# Patient Record
Sex: FEMALE | URBAN - METROPOLITAN AREA
[De-identification: names, ages, dates, MRNs, and addresses within clinical notes are randomized per-mention and may not be internally consistent; named-entity substitution may affect disease eponyms.]

---

## 2018-02-22 ENCOUNTER — RECORDS - HEALTHEAST (OUTPATIENT)
Dept: LAB | Facility: CLINIC | Age: 23
End: 2018-02-22

## 2018-02-24 LAB
QTF INTERPRETATION: ABNORMAL
QTF MITOGEN - NIL: >10 IU/ML
QTF NIL: 0.09 IU/ML
QTF RESULT: POSITIVE
QTF TB ANTIGEN - NIL: 6.54 IU/ML

## 2018-11-21 ENCOUNTER — HOSPITAL ENCOUNTER (INPATIENT)
Facility: CLINIC | Age: 23
LOS: 58 days | Discharge: HOME | End: 2019-01-18
Attending: FAMILY MEDICINE | Admitting: PSYCHIATRY & NEUROLOGY
Payer: COMMERCIAL

## 2018-11-21 DIAGNOSIS — G44.209 TENSION HEADACHE: Primary | ICD-10-CM

## 2018-11-21 DIAGNOSIS — R00.0 TACHYCARDIA: ICD-10-CM

## 2018-11-21 DIAGNOSIS — F28 OTHER PSYCHOTIC DISORDER NOT DUE TO SUBSTANCE OR KNOWN PHYSIOLOGICAL CONDITION (H): ICD-10-CM

## 2018-11-21 DIAGNOSIS — F25.9 SCHIZOAFFECTIVE DISORDER, UNSPECIFIED TYPE (H): ICD-10-CM

## 2018-11-21 DIAGNOSIS — K59.03 DRUG-INDUCED CONSTIPATION: ICD-10-CM

## 2018-11-21 DIAGNOSIS — E55.9 VITAMIN D DEFICIENCY: ICD-10-CM

## 2018-11-21 DIAGNOSIS — F29 PSYCHOSIS, UNSPECIFIED PSYCHOSIS TYPE (H): ICD-10-CM

## 2018-11-21 LAB
AMPHETAMINES UR QL SCN: NEGATIVE
BARBITURATES UR QL: NEGATIVE
BENZODIAZ UR QL: NEGATIVE
CANNABINOIDS UR QL SCN: NEGATIVE
COCAINE UR QL: NEGATIVE
ETHANOL UR QL SCN: NEGATIVE
HCG UR QL: NEGATIVE
OPIATES UR QL SCN: NEGATIVE

## 2018-11-21 PROCEDURE — 99285 EMERGENCY DEPT VISIT HI MDM: CPT | Mod: 25 | Performed by: FAMILY MEDICINE

## 2018-11-21 PROCEDURE — 81025 URINE PREGNANCY TEST: CPT | Performed by: FAMILY MEDICINE

## 2018-11-21 PROCEDURE — 93005 ELECTROCARDIOGRAM TRACING: CPT

## 2018-11-21 PROCEDURE — 80320 DRUG SCREEN QUANTALCOHOLS: CPT | Performed by: FAMILY MEDICINE

## 2018-11-21 PROCEDURE — 12400001 ZZH R&B MH UMMC

## 2018-11-21 PROCEDURE — 90791 PSYCH DIAGNOSTIC EVALUATION: CPT

## 2018-11-21 PROCEDURE — 80307 DRUG TEST PRSMV CHEM ANLYZR: CPT | Performed by: FAMILY MEDICINE

## 2018-11-21 PROCEDURE — 99285 EMERGENCY DEPT VISIT HI MDM: CPT | Mod: Z6 | Performed by: FAMILY MEDICINE

## 2018-11-21 PROCEDURE — 25000132 ZZH RX MED GY IP 250 OP 250 PS 637: Performed by: FAMILY MEDICINE

## 2018-11-21 PROCEDURE — 93010 ELECTROCARDIOGRAM REPORT: CPT | Performed by: INTERNAL MEDICINE

## 2018-11-21 PROCEDURE — 25000132 ZZH RX MED GY IP 250 OP 250 PS 637: Performed by: STUDENT IN AN ORGANIZED HEALTH CARE EDUCATION/TRAINING PROGRAM

## 2018-11-21 RX ORDER — OLANZAPINE 10 MG/2ML
10 INJECTION, POWDER, FOR SOLUTION INTRAMUSCULAR
Status: DISCONTINUED | OUTPATIENT
Start: 2018-11-21 | End: 2018-12-26

## 2018-11-21 RX ORDER — ARIPIPRAZOLE 20 MG/1
20 TABLET ORAL AT BEDTIME
COMMUNITY
End: 2018-11-21

## 2018-11-21 RX ORDER — OLANZAPINE 10 MG/1
10 TABLET, ORALLY DISINTEGRATING ORAL ONCE
Status: COMPLETED | OUTPATIENT
Start: 2018-11-21 | End: 2018-11-21

## 2018-11-21 RX ORDER — OLANZAPINE 10 MG/1
10 TABLET ORAL
Status: DISCONTINUED | OUTPATIENT
Start: 2018-11-21 | End: 2018-12-26

## 2018-11-21 RX ORDER — MULTIPLE VITAMINS W/ MINERALS TAB 9MG-400MCG
1 TAB ORAL DAILY
COMMUNITY
End: 2018-11-21

## 2018-11-21 RX ORDER — ACETAMINOPHEN 325 MG/1
650 TABLET ORAL EVERY 4 HOURS PRN
Status: DISCONTINUED | OUTPATIENT
Start: 2018-11-21 | End: 2019-01-18 | Stop reason: HOSPADM

## 2018-11-21 RX ORDER — OLANZAPINE 10 MG/1
10 TABLET ORAL AT BEDTIME
Status: DISCONTINUED | OUTPATIENT
Start: 2018-11-21 | End: 2018-11-27

## 2018-11-21 RX ORDER — HYDROXYZINE HYDROCHLORIDE 25 MG/1
25 TABLET, FILM COATED ORAL EVERY 4 HOURS PRN
Status: DISCONTINUED | OUTPATIENT
Start: 2018-11-21 | End: 2018-12-09

## 2018-11-21 RX ADMIN — OLANZAPINE 10 MG: 10 TABLET, FILM COATED ORAL at 21:16

## 2018-11-21 RX ADMIN — OLANZAPINE 10 MG: 10 TABLET, ORALLY DISINTEGRATING ORAL at 14:15

## 2018-11-21 ASSESSMENT — ENCOUNTER SYMPTOMS
NERVOUS/ANXIOUS: 1
HALLUCINATIONS: 1
FEVER: 0
SHORTNESS OF BREATH: 0
DYSPHORIC MOOD: 1
ABDOMINAL PAIN: 0

## 2018-11-21 ASSESSMENT — ACTIVITIES OF DAILY LIVING (ADL)
ORAL_HYGIENE: INDEPENDENT
DRESS: INDEPENDENT
GROOMING: INDEPENDENT
LAUNDRY: WITH SUPERVISION

## 2018-11-21 NOTE — ED PROVIDER NOTES
History     Chief Complaint   Patient presents with     Depression     irrational behaviors, jumped out of car on the way to psych appointment, has not been sleeping,      HPI  Dannie Davis is a 23 year old female who presents emergency room with increased psychotic features patient appears to be responding to internal stimuli here in the emergency room.  Today patient was supposed to be going to outpatient appointment and instead left the home was wandering through the streets stating that she was getting fast food.  Patient's family concerned about her safety she is not been tracking well and has not been sleeping for at least a week he has had a decreased appetite and no other associated symptoms but clearly not tracking the way she normally does.  Stressors include leaving her 3-year-old daughter in [facility] having her mother returning from [facility] without the granddaughter and at this time patient is feeling overwhelmed.  Patient denies any suicidal ideation but appears to be responding to stimuli when interviewed here in the emergency room.    I have reviewed the Medications, Allergies, Past Medical and Surgical History, and Social History in the Epic system.    PERSONAL MEDICAL HISTORY  Past Medical History:   Diagnosis Date     Gastroesophageal reflux disease      PAST SURGICAL HISTORY  History reviewed. No pertinent surgical history.  FAMILY HISTORY  No family history on file.  SOCIAL HISTORY  Social History   Substance Use Topics     Smoking status: Never Smoker     Smokeless tobacco: Never Used     Alcohol use Yes     MEDICATIONS  No current facility-administered medications for this encounter.      Current Outpatient Prescriptions   Medication     ACETAMINOPHEN PO     ARIPiprazole (ABILIFY) 20 MG tablet     BENZTROPINE MESYLATE PO     MIRTAZAPINE PO     multivitamin, therapeutic with minerals (MULTI-VITAMIN) TABS tablet     OMEPRAZOLE PO     ALLERGIES  Allergies   Allergen Reactions     Septra  [Sulfamethoxazole W/Trimethoprim]          Review of Systems   Constitutional: Negative for fever.   Respiratory: Negative for shortness of breath.    Cardiovascular: Negative for chest pain.   Gastrointestinal: Negative for abdominal pain.   Psychiatric/Behavioral: Positive for dysphoric mood and hallucinations. The patient is nervous/anxious.    All other systems reviewed and are negative.      Physical Exam   BP: (!) 136/96  Pulse: 110  Temp: 99.8  F (37.7  C)  Resp: 16  SpO2: 99 %      Physical Exam   Constitutional: No distress.   HENT:   Head: Atraumatic.   Mouth/Throat: Oropharynx is clear and moist. No oropharyngeal exudate.   Eyes: Pupils are equal, round, and reactive to light. No scleral icterus.   Cardiovascular: Normal heart sounds and intact distal pulses.    Pulmonary/Chest: Breath sounds normal. No respiratory distress.   Abdominal: Soft. Bowel sounds are normal. There is no tenderness.   Musculoskeletal: She exhibits no edema or tenderness.   Neurological: She is alert. No cranial nerve deficit. She exhibits normal muscle tone. Coordination normal.   Skin: Skin is warm. No rash noted. She is not diaphoretic.   Psychiatric: Her mood appears anxious. Her affect is blunt. She is actively hallucinating. Thought content is paranoid. She expresses impulsivity.       ED Course     ED Course     Procedures    Patient was seen and evaluated by the  please refer to her documentation in the note section of the epic chart dated 11/21/2018    Critical Care time:  none             Labs Ordered and Resulted from Time of ED Arrival Up to the Time of Departure from the ED   DRUG ABUSE SCREEN 6 CHEM DEP URINE ([facility])   HCG QUALITATIVE URINE            Assessments & Plan (with Medical Decision Making)     I have reviewed the nursing notes.    I have reviewed the findings, diagnosis, plan and need for follow up with the patient.  Patient with history of previous psychosis now presenting with recurring psychotic  features currently off medications for at least 3 or 4 months patient not tracking well and is at risk to herself and others she will be placed on 72-hour hold and admitted to locked psychiatric unit for further evaluation and treatment.    New Prescriptions    No medications on file       Final diagnoses:   Schizoaffective disorder, unspecified type (H)   Other psychotic disorder not due to substance or known physiological condition (H)       11/21/2018   [facility] [place], EMERGENCY DEPARTMENT     Marcelo Amador MD  11/21/18 2133

## 2018-11-21 NOTE — ED NOTES
Bed: ED16A  Expected date: 11/21/18  Expected time:   Means of arrival:   Comments:  N 725, Female, 23 y/o , Mental Health

## 2018-11-21 NOTE — ED NOTES
72 hour hold paper work given to patient. No issues ,concerns,questions for now. Patient remains calm.

## 2018-11-21 NOTE — PHARMACY-ADMISSION MEDICATION HISTORY
Admission medication history for the November 21, 2018 admission is complete.     Medication history interview sources: Patient's father, Care Everywhere ([facility])    Medication compliance: N/A - patient not prescribed medications    Preferred Outpatient Pharmacy: Unknown - father was not certain of pharmacy name ([facility]?)    Changes made to PTA medication list (reason)  Added: none  Deleted:   - olanzapine 20 mg twice daily  - mirtazapine 15 mg at bedtime  - lorazepam 0.5 mg twice daily prn agitation   Changed: none    Additional medication history information (including reliability of information, actions taken by pharmacist):  - Patient was hospitalized at [facility] last year from 4/29-5/31/2017 where she was started on the medications listed above (olanzapine, mirtazapine, lorazepam). According to the patient's father she has since received care at [facility], however it has been at least 6 months since she has taken any medications and it is unclear if she has been following up with [facility] recently. Patient's father could not recall what pharmacy the patient used and [facility] is closed at this time so unable to verify if medications have been changed since her hospitalization in 2017.     Prior to Admission medications    Not on File       Time spent: 40 minutes    Medication history completed by:   Jazmin Shah PharmD  [facility]  Available daily from 1-9 PM: phone [phone], pager [phone]

## 2018-11-21 NOTE — ED NOTES
ED to Behavioral Floor Handoff    SITUATION  Dannie Davis is a 23 year old female who speaks English and lives in a home with family members The patient arrived in the ED by transportation van from home with a complaint of Depression (irrational behaviors, jumped out of car on the way to psych appointment, has not been sleeping, )  .The patient's current symptoms started/worsened 1 week(s) ago and during this time the symptoms have increased.   In the ED, pt was diagnosed with   Final diagnoses:   Schizoaffective disorder, unspecified type (H)   Other psychotic disorder not due to substance or known physiological condition (H)        Initial vitals were: BP: (!) 136/96  Pulse: 110  Temp: 99.8  F (37.7  C)  Resp: 16  SpO2: 99 %   --------  Is the patient diabetic? No   If yes, last blood glucose? --     If yes, was this treated in the ED? --  --------  Is the patient inebriated (ETOH) No or Impaired on other substances? No  MSSA done? No  Last MSSA score: --    Were withdrawal symptoms treated? No  Does the patient have a seizure history? No. If yes, date of most recent seizure--  --------  Is the patient patient experiencing suicidal ideation? denies current or recent suicidal ideation     Homicidal ideation? denies current or recent homicidal ideation or behaviors.    Self-injurious behavior/urges? denies current or recent self injurious behavior or ideation.  ------  Was pt aggressive in the ED No  Was a code called No  Is the pt now cooperative? Yes  -------  Meds given in ED:   Medications   OLANZapine zydis (zyPREXA) ODT tab 10 mg (10 mg Oral Given 11/21/18 1415)      Family present during ED course? Yes  Family currently present? Yes    BACKGROUND  Does the patient have a cognitive impairment or developmental disability? Yes  Allergies:   Allergies   Allergen Reactions     Septra [Sulfamethoxazole W/Trimethoprim]    .   Social demographics are   Social History     Social History     Marital status: Single      Spouse name: N/A     Number of children: N/A     Years of education: N/A     Social History Main Topics     Smoking status: Never Smoker     Smokeless tobacco: Never Used     Alcohol use Yes     Drug use: No     Sexual activity: Not Asked     Other Topics Concern     None     Social History Narrative     None        ASSESSMENT  Labs results   Labs Ordered and Resulted from Time of ED Arrival Up to the Time of Departure from the ED   DRUG ABUSE SCREEN 6 CHEM DEP URINE ([facility])   HCG QUALITATIVE URINE      Imaging Studies: No results found for this or any previous visit (from the past 24 hour(s)).   Most recent vital signs BP (!) 139/97  Pulse 103  Temp 98.7  F (37.1  C) (Oral)  Resp 20  SpO2 98%   Abnormal labs/tests/findings requiring intervention:---   Pain control: pt had none  Nausea control: pt had none    RECOMMENDATION  Are any infection precautions needed (MRSA, VRE, etc.)? No If yes, what infection? --  ---  Does the patient have mobility issues? independently. If yes, what device does the pt use? ---  ---  Is patient on 72 hour hold or commitment? No If on 72 hour hold, have hold and rights been given to patient? No  Are admitting orders written if after 10 p.m. ?No  Tasks needing to be completed:---     Mirtha Hagan   ascom--    [phone] [facility] ED   [phone] [facility] ED

## 2018-11-22 LAB
ALBUMIN SERPL-MCNC: 3.3 G/DL (ref 3.4–5)
ALP SERPL-CCNC: 49 U/L (ref 40–150)
ALT SERPL W P-5'-P-CCNC: 18 U/L (ref 0–50)
ANION GAP SERPL CALCULATED.3IONS-SCNC: 8 MMOL/L (ref 3–14)
AST SERPL W P-5'-P-CCNC: 13 U/L (ref 0–45)
BASOPHILS # BLD AUTO: 0 10E9/L (ref 0–0.2)
BASOPHILS NFR BLD AUTO: 0.2 %
BILIRUB SERPL-MCNC: 0.4 MG/DL (ref 0.2–1.3)
BUN SERPL-MCNC: 8 MG/DL (ref 7–30)
CALCIUM SERPL-MCNC: 8.7 MG/DL (ref 8.5–10.1)
CHLORIDE SERPL-SCNC: 107 MMOL/L (ref 94–109)
CHOLEST SERPL-MCNC: 148 MG/DL
CO2 SERPL-SCNC: 27 MMOL/L (ref 20–32)
CREAT SERPL-MCNC: 0.67 MG/DL (ref 0.52–1.04)
DIFFERENTIAL METHOD BLD: ABNORMAL
EOSINOPHIL # BLD AUTO: 0.2 10E9/L (ref 0–0.7)
EOSINOPHIL NFR BLD AUTO: 3.1 %
ERYTHROCYTE [DISTWIDTH] IN BLOOD BY AUTOMATED COUNT: 12.9 % (ref 10–15)
ERYTHROCYTE [SEDIMENTATION RATE] IN BLOOD BY WESTERGREN METHOD: 15 MM/H (ref 0–20)
FOLATE SERPL-MCNC: 14.3 NG/ML
GFR SERPL CREATININE-BSD FRML MDRD: >90 ML/MIN/1.7M2
GLUCOSE SERPL-MCNC: 94 MG/DL (ref 70–99)
HCT VFR BLD AUTO: 34.6 % (ref 35–47)
HDLC SERPL-MCNC: 56 MG/DL
HGB BLD-MCNC: 11.8 G/DL (ref 11.7–15.7)
IMM GRANULOCYTES # BLD: 0 10E9/L (ref 0–0.4)
IMM GRANULOCYTES NFR BLD: 0.2 %
LDLC SERPL CALC-MCNC: 79 MG/DL
LYMPHOCYTES # BLD AUTO: 2 10E9/L (ref 0.8–5.3)
LYMPHOCYTES NFR BLD AUTO: 41.3 %
MCH RBC QN AUTO: 27 PG (ref 26.5–33)
MCHC RBC AUTO-ENTMCNC: 34.1 G/DL (ref 31.5–36.5)
MCV RBC AUTO: 79 FL (ref 78–100)
MONOCYTES # BLD AUTO: 0.4 10E9/L (ref 0–1.3)
MONOCYTES NFR BLD AUTO: 8 %
NEUTROPHILS # BLD AUTO: 2.3 10E9/L (ref 1.6–8.3)
NEUTROPHILS NFR BLD AUTO: 47.2 %
NONHDLC SERPL-MCNC: 92 MG/DL
NRBC # BLD AUTO: 0 10*3/UL
NRBC BLD AUTO-RTO: 0 /100
PLATELET # BLD AUTO: 212 10E9/L (ref 150–450)
POTASSIUM SERPL-SCNC: 3.6 MMOL/L (ref 3.4–5.3)
PROT SERPL-MCNC: 7.4 G/DL (ref 6.8–8.8)
RBC # BLD AUTO: 4.37 10E12/L (ref 3.8–5.2)
SODIUM SERPL-SCNC: 142 MMOL/L (ref 133–144)
TRIGL SERPL-MCNC: 66 MG/DL
TSH SERPL DL<=0.005 MIU/L-ACNC: 1.33 MU/L (ref 0.4–4)
VIT B12 SERPL-MCNC: 907 PG/ML (ref 193–986)
WBC # BLD AUTO: 4.9 10E9/L (ref 4–11)

## 2018-11-22 PROCEDURE — 87389 HIV-1 AG W/HIV-1&-2 AB AG IA: CPT | Performed by: STUDENT IN AN ORGANIZED HEALTH CARE EDUCATION/TRAINING PROGRAM

## 2018-11-22 PROCEDURE — 36415 COLL VENOUS BLD VENIPUNCTURE: CPT | Performed by: STUDENT IN AN ORGANIZED HEALTH CARE EDUCATION/TRAINING PROGRAM

## 2018-11-22 PROCEDURE — 99223 1ST HOSP IP/OBS HIGH 75: CPT | Mod: AI | Performed by: PSYCHIATRY & NEUROLOGY

## 2018-11-22 PROCEDURE — 80053 COMPREHEN METABOLIC PANEL: CPT | Performed by: STUDENT IN AN ORGANIZED HEALTH CARE EDUCATION/TRAINING PROGRAM

## 2018-11-22 PROCEDURE — 86618 LYME DISEASE ANTIBODY: CPT | Performed by: STUDENT IN AN ORGANIZED HEALTH CARE EDUCATION/TRAINING PROGRAM

## 2018-11-22 PROCEDURE — 86780 TREPONEMA PALLIDUM: CPT | Performed by: STUDENT IN AN ORGANIZED HEALTH CARE EDUCATION/TRAINING PROGRAM

## 2018-11-22 PROCEDURE — 82607 VITAMIN B-12: CPT | Performed by: STUDENT IN AN ORGANIZED HEALTH CARE EDUCATION/TRAINING PROGRAM

## 2018-11-22 PROCEDURE — 25000132 ZZH RX MED GY IP 250 OP 250 PS 637: Performed by: STUDENT IN AN ORGANIZED HEALTH CARE EDUCATION/TRAINING PROGRAM

## 2018-11-22 PROCEDURE — 82390 ASSAY OF CERULOPLASMIN: CPT | Performed by: STUDENT IN AN ORGANIZED HEALTH CARE EDUCATION/TRAINING PROGRAM

## 2018-11-22 PROCEDURE — 12400007 ZZH R&B MH INTERMEDIATE UMMC

## 2018-11-22 PROCEDURE — 82306 VITAMIN D 25 HYDROXY: CPT | Performed by: STUDENT IN AN ORGANIZED HEALTH CARE EDUCATION/TRAINING PROGRAM

## 2018-11-22 PROCEDURE — 85025 COMPLETE CBC W/AUTO DIFF WBC: CPT | Performed by: STUDENT IN AN ORGANIZED HEALTH CARE EDUCATION/TRAINING PROGRAM

## 2018-11-22 PROCEDURE — 82746 ASSAY OF FOLIC ACID SERUM: CPT | Performed by: STUDENT IN AN ORGANIZED HEALTH CARE EDUCATION/TRAINING PROGRAM

## 2018-11-22 PROCEDURE — 85652 RBC SED RATE AUTOMATED: CPT | Performed by: STUDENT IN AN ORGANIZED HEALTH CARE EDUCATION/TRAINING PROGRAM

## 2018-11-22 PROCEDURE — 84443 ASSAY THYROID STIM HORMONE: CPT | Performed by: STUDENT IN AN ORGANIZED HEALTH CARE EDUCATION/TRAINING PROGRAM

## 2018-11-22 PROCEDURE — 80061 LIPID PANEL: CPT | Performed by: STUDENT IN AN ORGANIZED HEALTH CARE EDUCATION/TRAINING PROGRAM

## 2018-11-22 PROCEDURE — 86038 ANTINUCLEAR ANTIBODIES: CPT | Performed by: STUDENT IN AN ORGANIZED HEALTH CARE EDUCATION/TRAINING PROGRAM

## 2018-11-22 RX ORDER — AMOXICILLIN 250 MG
1-2 CAPSULE ORAL 2 TIMES DAILY PRN
Status: DISCONTINUED | OUTPATIENT
Start: 2018-11-22 | End: 2018-11-28

## 2018-11-22 RX ORDER — ALUMINA, MAGNESIA, AND SIMETHICONE 2400; 2400; 240 MG/30ML; MG/30ML; MG/30ML
30 SUSPENSION ORAL 4 TIMES DAILY PRN
Status: DISCONTINUED | OUTPATIENT
Start: 2018-11-22 | End: 2019-01-18 | Stop reason: HOSPADM

## 2018-11-22 RX ADMIN — OLANZAPINE 10 MG: 10 TABLET, FILM COATED ORAL at 20:29

## 2018-11-22 RX ADMIN — OLANZAPINE 10 MG: 10 TABLET, FILM COATED ORAL at 23:17

## 2018-11-22 RX ADMIN — SENNOSIDES AND DOCUSATE SODIUM 1 TABLET: 8.6; 5 TABLET ORAL at 13:46

## 2018-11-22 ASSESSMENT — ACTIVITIES OF DAILY LIVING (ADL)
DRESS: INDEPENDENT
LAUNDRY: WITH SUPERVISION
ORAL_HYGIENE: INDEPENDENT
GROOMING: INDEPENDENT;SHOWER;HANDWASHING
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
## 2018-11-22 NOTE — PROGRESS NOTES
11/21/18 2011   Patient Belongings   Did you bring any home meds/supplements to the hospital?  No   Patient Belongings cell phone/electronics;clothing;shoes   Disposition of Belongings Kept with patient;Locker   Belongings Search Yes   Clothing Search Yes   Second Staff Michael JONES   General Info Comment No belongings sent to security      Belongings: Black boots, Gray Jacket, cell Phone, ID with keys attached (in jacket pocket), clothing, brown head cap    A               Admission:  I am responsible for any personal items that are not sent to the safe or pharmacy.  [facility] is not responsible for loss, theft or damage of any property in my possession.    Signature:  _________________________________ Date: _______  Time: _____                                              Staff Signature:  ____________________________ Date: ________  Time: _____      2nd Staff person, if patient is unable/unwilling to sign:    Signature: ________________________________ Date: ________  Time: _____     Discharge:  [facility] has returned all of my personal belongings:    Signature: _________________________________ Date: ________  Time: _____                                          Staff Signature:  ____________________________ Date: ________  Time: _____

## 2018-11-22 NOTE — PLAN OF CARE
"Admission:     Admitted to  22 on a 72 hour hold for increasing psychotic symptoms. Endorses auditory hallucinations. Family reports she has not been tracking, and has not slept in a week. One prior hospitalization in January 2017 where she was placed on a stay of commitment. Has been off medications for approximately 6 months. The pt came from [facility] in 2017, and apparently left a 3 year old daughter there, who she states she misses. Her mother is currently in [facility] and returning without the granddaughter per ER notes. Pt reports that her father and brother are \"controlling\", and the only person in her family who is supportive is her mother. (See chart review for further details and hx)    Presents as calm and cooperative completing admission. Speech is delayed, mumbled at times. Pt states she prefers English and does not want an . Appears preoccupied and responding to internal stimuli. Distractible with poor concentration. She denies suicidal thoughts, but does say she has been feeling depressed. Oriented to room and unit. Provided meal. Encouraged to notify staff of needs, questions, and concerns. Pt agrees.   "

## 2018-11-22 NOTE — PROGRESS NOTES
Patient remained isolative and withdrawn much of the shift mostly pacing in the freedman. She calm, cooperative but appeared depressed and sad. She denied SI and SIB thoughts,  A&V hallucinations and racing thoughts. Her affect was flat and blunted, sometimes presenting with thought blocking during one-on-one interview. Patient reported mild anxiety and depression. She had limited insight and judgement, her hygiene was inadequate and was encouraged to take shower. Patient c/o constipation and was given senna one tablet with some improvement. She told me that she currently lives with her father and brother and she works in [employer] [place]. She said that she gives her father $450 every month for rent. Her main stressor is not seeing her baby and trying to get back to work to pay bills.

## 2018-11-22 NOTE — PLAN OF CARE
Problem: Occupational Therapy Goals (Adult)  Goal: Occupational Therapy Goals  Stand Alone Therapy Goal    Pt has not attended OT since admit.  Will continue to encourage participation and completion of  self-assessment as able. OT staff will explain the purpose of being involved with treatment plan and provide options to meet current needs and goals.

## 2018-11-22 NOTE — H&P
"History and Physical    Dannie Davis MRN# [id number]   Age: 23 year old YOB: 1995     Date of Admission:  11/21/2018          Contacts:   Primary Outpatient Psychiatrist: BRODERICK Gomez, [place] and Guillermo Li MD at [facility] clinic (next appointment with Dr. Guillermo Li is on 12/27/18 9am)  Primary Physician:  No Ref-Primary, Physician  [facility] : Vinayak Latham [phone]  Family Members: Jonathan Davis, father, [phone]         Chief Complaint:   \"My father thinks I am sick.\"         History of Present Illness:   History obtained from patient interview, chart review.  Pt interviewed in the [facility] at approximately 7:30pm.    This patient is a 23 year old female with a psychiatric history of MDD with psychotic features who presented to the [facility] ED via EMS on 11/21/2018 due to psychosis in the setting of medication non-adherence. She was medically cleared for admission to inpatient psychiatric unit.    Per ED report:  Per Dannie's father, she was supposed to attend an outpatient appointment today for \"an itchy vagina.\" She was getting ready to leave but instead left the house and began walking down the street to get fast food. He tried to get her to come home but she refused, walking down the middle of the street. She reportedly attempted to jump out of a car en route to the appointment. Her family has been concerned about her safety as she has not been tracking well, has not been sleeping for at least a week, and has had a decreased appetite. Dannie has been non-adherent to medications for at least 6 months. She was seen at [facility] yesterday by psychiatry to restart medications, but Dannie has yet to restart these. Stressors include leaving her 3-year-old daughter in [place] when she immigrated to the [place] in 1/2017 and having her mother return to [place] to be with patient's daughter. 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 In the ED, she was " "noted to be a poor historian. She reported feeling overwhelmed but denied SI or HI. She denies trying to jump out of a moving car. She was responding to internal stimuli, slow to respond to questions, repeatedly sticking out her tongue to touch her bottom lip, and seen chanting and singing to herself. She became agitated in the ED and was given Olanzapine 10mg once in the ED at 1415. UDS and HCG urine negative.     Per patient report:    When asked why she is in the hospital, Dannie describes that all of the food in her house tastes bitter, so she decided to go get fast food today. Her father then told her to stop walking to get food and that she was sick. Dannie states that she is \"scared\" of her father but denies that he has harmed her. She states that \"when I look at my father and brother, I know they are trying to hurt me. They are controlling me.\" She is frightened by this and is unsure if other people are trying to harm her. She describes her father controlling her as \"when he was here, he made me very serious.\" She notes that she is less serious now that he is gone. Dannie endorses auditory hallucinations that began about a week ago, about the same time that she noticed a scratch below her lower lip. When asked what the voices say, she responds that the voices \"make my ear itch and make me feel funny.\" The voices also \"make my heart beat strong.\" She denies that the voices have been commanding her to do anything and specifically denies that the voices command her to harm herself or others. She describes that the voices do control her and make her \"sit, stand, and eat food.\" Dannie denies visual hallucinations or ideas of references. She endorses both thought insertions and deletions that \"make my heart beat strong.\" She describes her mood as \"fine\" and notes that she has had difficulty sleeping for the past week, since she noticed the scratch below her lip. She states that she has been on Abilify and " "Olanzapine before and agrees to start Olanzapine tonight.     Per chart review: Per records, Dannie was hospitalized at [facility] from -17 with the diagnosis of MDD with psychotic features. Those records indicate that she did not have any psychiatric symptoms before coming to the  and being  from her daughter in 2017. During that admission, she had a negative CT head and normal EKG.     The risks, benefits, alternatives and side effects have been discussed and are understood by the patient and other caregivers.       Psychiatric Review of Systems:   Depression:   Reports: changes in sleep, irritability.   Denies: depressed mood, suicidal ideation, changes in appetite,  hopelessness, helplessness, decreased energy, irritability.  Jadyn:   Reports: sleeplessness, impulsiveness of walking down the middle of the street.   Denies: increased goal-directed activities, pressured speech, grandiose delusions.  Psychosis:   Reports: auditory hallucinations, paranoia, thought insertions, thought deletions.  Denies: visual hallucinations, grandiosity, ideas of reference         Medical Review of Systems:   The Review of Systems is negative other than noted in the HPI         Psychiatric History:     Prior diagnoses: MDD with psychotic features    Hospitalizations: Hospitalized at [facility] -17 where started on olanzapine, mirtazapine, lorazepam.     Suicide attempts: denies    Self-injurious behavior: denies    Violence: History of 5th degree assault charges. History of aggression towards her mother in 2017    ECT/TMS: denies    Past medications: Abilify 20mg daily, Olanzapine 20mg BID, Mirtazapine 15mg at bedtime, lorazepam 0.5mg BID prn    Prior commitments: Patient was on a stay of commitment in  that  2017         Substance Use History:     Nicotine: denies    Alcohol: \"occasionally\"           Cannabis: denies    Others: denies    Prior CD treatments: denies         " Past Medical History:     Past Medical History:   Diagnosis Date     Gastroesophageal reflux disease      History reviewed. No pertinent surgical history.  No History of seizures or head trauma.       Allergies:      Allergies   Allergen Reactions     Septra [Sulfamethoxazole W/Trimethoprim]           Medications:     No current outpatient prescriptions on file.           Social History:     Up[place]: [place] from [facility] in 1/[place]    Family/Relationships: Mother and patient's daughter are in [facility]. Patient's daughter is young, about 3-3 yo.    Living Situation: Lives with Father an Brother.     Occupation: Packaging at [employer]    Legal: Per father, has a reciprocal restraining order with a man she had a relationship with and she violated the restraining order. She has a court date on 12/20/18 for this. Patient charged with 5th degree assault in 2017.     Abuse/Trauma: Possible trauma history while in [facility]         Family History:     No family history on file.         Labs:     Recent Results (from the past 24 hour(s))   Drug abuse screen 6 urine (tox)    Collection Time: 11/21/18  1:20 PM   Result Value Ref Range    Amphetamine Qual Urine Negative NEG^Negative    Barbiturates Qual Urine Negative NEG^Negative    Benzodiazepine Qual Urine Negative NEG^Negative    Cannabinoids Qual Urine Negative NEG^Negative    Cocaine Qual Urine Negative NEG^Negative    Ethanol Qual Urine Negative NEG^Negative    Opiates Qualitative Urine Negative NEG^Negative   HCG qualitative urine    Collection Time: 11/21/18  1:20 PM   Result Value Ref Range    HCG Qual Urine Negative NEG^Negative            Psychiatric Examination:     BP (!) 139/97  Pulse 103  Temp 98.7  F (37.1  C) (Oral)  Resp 20  SpO2 98%    Appearance:  awake, alert, adequately groomed and wearing personal clothes. Initially standing in doorway of room but agrees to sit in chair for interview. Sits comfortably in chair. Removes one sock for entire interview.  "Yawns intermittently.   Attitude:  cooperative  Eye Contact:  fair, looks down at ground or sock in her hand occasionally  Mood:  \"fine\"  Affect:  mood incongruent and intensity is flat  Speech:  Difficult to understand at times given accent. Normal quantity though not conversational. Normal rate and volume. Slow to respond to some questions.  Psychomotor Behavior:  no evidence of tardive dyskinesia, dystonia, or tics. No psychomotor agitation  Thought Process:  linear, goal oriented and responds appropriately to questions. Slow to answer some questions but does not stop speaking mid sentence, unclear if thought blocking  Associations:  no loose associations  Thought Content:  no evidence of suicidal ideation or homicidal ideation, auditory hallucinations present and no visual hallucinations present. Patient does not appear to be responding to internal stimuli at this time. Endorses paranoid delusions.   Insight:  limited, does not believe that she has a mental illness  Judgment:  fair, agrees to start olanzapine  Oriented to:  time, person, and place  Attention Span and Concentration:  fair  Recent and Remote Memory:  intact  Language:  english with appropriate syntax and vocabulary  Fund of Knowledge: appropriate  Muscle Strength and Tone: did not assess, grossly normal  Gait and Station: Normal         Physical Exam:     See ED assessment note by Dr. Amador on 11/21/2018        Assessment   Dannie Davis is a 23 year old female with a history of MDD with psychotic features who presented to the [facility] ED via EMS  11/21/2018 with psychosis in the context of medication non-adherence. The patient's last psychiatric hospitalization was in 5/2017.  The patient is currently followed by [facility] clinic. Unknown family history. Current psychosocial stressors include separation from young daughter and [place]'s daughter. The patient denies drug abuse or self injurious behaviors. The MSE is " notable for an -American woman sitting calmly in a chair with a flat affect and slow responses to questions. She endorses paranoid delusions and AH. The patient's reported symptoms of auditory hallucinations, paranoid delusions, thought insertions and deletions, delusion of her body being controlled, and difficulty sleeping are consistent with psychosis. Dannie has a historical diagnosis of MDD with psychotic features, though she denies any symptoms of a current depressive episode besides difficulty sleeping. This raises concern for an alternate diagnosis. Her difficulty sleeping may reflect bipolar disorder, though, she appears to have minimal symptoms of a mood disorder. Given the current absence of mood symptoms, she may have a primary psychotic disorder. While medications were prescribed yesterday by outpatient psychiatry, Dannie has yet to start these. As she was agitated in the ED, appeared to improve slightly with a prn olanzapine, and agreed to start olanzapine, scheduled olanzapine will be initiated this evening. First episode psychosis labs were seen in records from [facility], so they will be obtained. Given that she is a danger to herself given her significant disorganization and risky behaviors such as getting out of a moving car, patient warrants inpatient psychiatric hospitalization to maintain her safety. Disposition pending clinical stabilization, medication optimization and development of an appropriate discharge plan.        Plan   Admit to Unit 22 with Attending Physician Dr. Vann  Legal Status: 72-h Hold signed on 11/21    Safety Assessment:      Pt has not required locked seclusion or restraints in the past 24 hours to maintain safety, please refer to RN documentation for further details.    Precautions: assault, elopement    Principal psychiatric diagnosis:   # Major Depressive Disorder with psychotic features vs. Bipolar Disorder type I, currently psychotic    Secondary  psychiatric diagnoses: none    Medications:   Outpatient medications held:    Per father, patient was prescribed Abilify, Benztropine, and Mirtazapine by outpatient psychiatry on 11/20 but had not started.     Outpatient medications continued:   none    New medications initiated:   - Olanzapine 10mg at bedtime  - Olanzapine 10mg PO/IM prn agitation  - Hydroxyzine 25mg q4h prn anxiety  - Tylenol prn mild pain    - Patient will be treated in therapeutic milieu with appropriate individual and group therapies.  - medications as above    Medical diagnoses:    # GERD   Per records has been on omeprazole. Patient nonadherent at home and denies taking, so will hold at this time.     Consult: none  Labs: CBC, CMP, Vit D, TSH with reflex, UA, Vit B12, Folate to be obtained in AM as well as first episode psychosis work-up: Treponemal Ab, Lyme titer, ESR, EMERALD, Ceruloplasmin, HIV, EKG    Relevant psychosocial stressors: separation from young daughter in [facility], mother planning to [place] to [place] without patient's daughter     Dispo: unknown pending medication management and clinical stabilization    Patient to be seen and staffed by attending physician, Dr. Luke, in the morning.   -------------------------------------------------------  Evangelina Bustos MD  Psychiatry PGY-2          Attestation:  I, Leni Luke, have personally performed an examination of this patient and I have reviewed the resident's documentation.  I have edited the note to reflect all relevant changes.  I have discussed this patient with the house staff on 11/22/2018.  I agree with resident findings and plan in today's note and yesterdays resident H&P.  I have reviewed all vitals and laboratory findings.      Dannie is motivated for discharge and does not feel hospitalization is warranted. She does report experiencing auditory hallucinations. She states these started about a week ago when she developed a rash beneath her lip. She does have a linear rash  "or scar, horizontal, 1.5 inches, between her lip and chin. She states that this appeared and was initially swollen and made her lip feel heavy, but was dry (not weeping or bleeding). She also felt a hardness or \"evilness\" in her heart around the same time, which made it difficult for her to cry or feel things.  She does not recall taking onlanzapine yesterday, I confirmed with staff that this was administered.     Leni Luke MD    "

## 2018-11-22 NOTE — PLAN OF CARE
Problem: Patient Care Overview  Goal: Team Discussion  Team Plan:   BEHAVIORAL TEAM DISCUSSION    Participants: Dr. Giulia Bowens [facility]  Progress: Initiated with hospitalization   Continued Stay Criteria/Rationale: Came into the ED with family due to concerns of disorganization due to non adherence to medications.   Medical/Physical: History of back surgery  Precautions:   Behavioral Orders   Procedures     Assault precautions     Code 1 - Restrict to Unit     Elopement precautions     Routine Programming     As clinically indicated     Single Room     Status 15     Every 15 minutes.     Plan: Continue to monitor patient's symptoms as a medication regiment is reinitiated and document changes until proper discharge is set in place.   Rationale for change in precautions or plan: No change has been indicated.

## 2018-11-23 LAB
ANA SER QL IF: NEGATIVE
B BURGDOR IGG+IGM SER QL: 0.12 (ref 0–0.89)
CERULOPLASMIN SERPL-MCNC: 42 MG/DL (ref 23–53)
DEPRECATED CALCIDIOL+CALCIFEROL SERPL-MC: 17 UG/L (ref 20–75)
HIV 1+2 AB+HIV1 P24 AG SERPL QL IA: NONREACTIVE
T PALLIDUM AB SER QL: NONREACTIVE

## 2018-11-23 PROCEDURE — 12400007 ZZH R&B MH INTERMEDIATE UMMC

## 2018-11-23 PROCEDURE — 25000132 ZZH RX MED GY IP 250 OP 250 PS 637: Performed by: STUDENT IN AN ORGANIZED HEALTH CARE EDUCATION/TRAINING PROGRAM

## 2018-11-23 RX ADMIN — OLANZAPINE 10 MG: 10 TABLET, FILM COATED ORAL at 20:24

## 2018-11-23 RX ADMIN — OLANZAPINE 10 MG: 10 TABLET, FILM COATED ORAL at 15:46

## 2018-11-23 RX ADMIN — ACETAMINOPHEN 650 MG: 325 TABLET, FILM COATED ORAL at 14:21

## 2018-11-23 ASSESSMENT — ACTIVITIES OF DAILY LIVING (ADL)
LAUNDRY: WITH SUPERVISION
GROOMING: INDEPENDENT
DRESS: INDEPENDENT
ORAL_HYGIENE: INDEPENDENT

## 2018-11-23 NOTE — PROGRESS NOTES
"Pt was visible  In milieu all shift pacing and listening to music, blunted/flat affect . Pt reports \" hearing voices  and seeing things\". \"I am scared \",when asked what was causing her to feel scared , she responding \"I don't know\". Pt appears guarded, presents with poor concentration and limited insight.  Pt ate very little of her meals this shift . Pt denies SI/SIB thoughts, delayed response when asked questions . No further concerns.           11/23/18 6032   Behavioral Health   Hallucinations auditory;visual   Thinking confused;distractable;paranoid   Orientation place: oriented   Memory baseline memory   Insight poor   Judgement impaired   Eye Contact at examiner   Affect blunted, flat   Mood mood is calm   Physical Appearance/Attire attire appropriate to age and situation   Hygiene other (see comment)  (adequate)   Suicidality other (see comments)   1. Wish to be Dead No   Wish to be Dead Description (none)   2. Non-Specific Active Suicidal Thoughts  No   Self Injury other (see comment)  (Pt  denies)   Activity withdrawn   Speech (slow in respond. )   Medication Sensitivity no stated side effects;no observed side effects   Psychomotor / Gait paces   Psycho Education   Type of Intervention 1:1 intervention   Response participates, initiates socially appropriate   Hours 0.5   Treatment Detail (check in / observation )   Activities of Daily Living   Hygiene/Grooming independent   Oral Hygiene independent   Dress independent   Laundry with supervision   Room Organization independent   Activity   Activity Assistance Provided independent     "

## 2018-11-23 NOTE — PROGRESS NOTES
Pt was visible in the milieu, appeared withdrawn. Pt received multiple calls from her father today but would refused or hangup the phone once she picked up the call. Pt was visited by her father and uncle today but ignored them when they entered the unit. When asked why she ignored their presence she told staff she was afraid of them. Pt states she feels safe on the unit, denies SI/SIB. When asked about hallucinations pt avoided eye contact with staff, staring in the opposite direction, paused for a while before replying no.      11/22/18 2100   Behavioral Health   Hallucinations denies / not responding to hallucinations   Thinking distractable;paranoid   Orientation person: oriented;place: oriented   Memory baseline memory   Insight poor   Judgement impaired   Eye Contact at examiner   Affect blunted, flat   Mood mood is calm   Physical Appearance/Attire attire appropriate to age and situation;neat   Hygiene well groomed   Suicidality other (see comments)  (denies)   1. Wish to be Dead No   2. Non-Specific Active Suicidal Thoughts  No   Self Injury other (see comment)  (denies)   Activity withdrawn   Speech coherent   Medication Sensitivity no observed side effects;no stated side effects   Psychomotor / Gait steady;balanced   Psycho Education   Type of Intervention 1:1 intervention   Response participates with cues/redirection   Hours 0.5   Activities of Daily Living   Hygiene/Grooming independent;shower;handwashing   Oral Hygiene independent   Dress independent   Laundry with supervision   Room Organization independent   Activity   Activity Assistance Provided independent

## 2018-11-23 NOTE — PROGRESS NOTES
"Just following initiation of shift/heard and observed pt. Screaming and running  as though extremely frightened from her room.  W/ supportive intervention (as would not initially respond to staff)  Was able to say that whe was \"seeing things\".  Sat, w/spent time w/ pt.  to validate what she was experiencing as well as to assure pt. that she is safe on the unit, that staff are routinely observing her and are available for any needs.  Accepted and was able to return to her room.  After a few minutes was out of room requesting to use another bathroom \"becausing I am seeing things\".   Was able to use own bathroom when reassured and accompanied by staff.  Had moderate BM. Too fearful however to allow the bathroom door to be closed for privacy.  When explored w/ pt. What she was seeing, pt. Responded \"my dad\", then spelled the word \"d-a-d\".  Had just received prn at shift change.  Continued supportive realistic reassurance provided as appropriate till pt. was able to lie on the bed in an effort to sleep w/ staff present .  Fell  asleep at approx 0145 w/ regular non-labored respirations and continues sleeping as of 0500.  Safe, therapeutic environment provided and maintained.   "

## 2018-11-24 PROCEDURE — 25000132 ZZH RX MED GY IP 250 OP 250 PS 637: Performed by: STUDENT IN AN ORGANIZED HEALTH CARE EDUCATION/TRAINING PROGRAM

## 2018-11-24 PROCEDURE — 25000132 ZZH RX MED GY IP 250 OP 250 PS 637

## 2018-11-24 PROCEDURE — 12400007 ZZH R&B MH INTERMEDIATE UMMC

## 2018-11-24 PROCEDURE — 25000132 ZZH RX MED GY IP 250 OP 250 PS 637: Performed by: PSYCHIATRY & NEUROLOGY

## 2018-11-24 RX ORDER — TRAZODONE HYDROCHLORIDE 50 MG/1
50 TABLET, FILM COATED ORAL
Status: DISCONTINUED | OUTPATIENT
Start: 2018-11-24 | End: 2018-12-27

## 2018-11-24 RX ADMIN — OLANZAPINE 10 MG: 10 TABLET, FILM COATED ORAL at 20:18

## 2018-11-24 RX ADMIN — TRAZODONE HYDROCHLORIDE 50 MG: 50 TABLET ORAL at 23:40

## 2018-11-24 RX ADMIN — Medication: at 20:34

## 2018-11-24 RX ADMIN — SENNOSIDES AND DOCUSATE SODIUM 1 TABLET: 8.6; 5 TABLET ORAL at 23:54

## 2018-11-24 RX ADMIN — OLANZAPINE 10 MG: 10 TABLET, FILM COATED ORAL at 17:33

## 2018-11-24 RX ADMIN — OLANZAPINE 10 MG: 10 TABLET, FILM COATED ORAL at 02:33

## 2018-11-24 RX ADMIN — ACETAMINOPHEN 650 MG: 325 TABLET, FILM COATED ORAL at 03:26

## 2018-11-24 RX ADMIN — SENNOSIDES AND DOCUSATE SODIUM 2 TABLET: 8.6; 5 TABLET ORAL at 02:43

## 2018-11-24 RX ADMIN — ALUMINUM HYDROXIDE, MAGNESIUM HYDROXIDE, AND DIMETHICONE 30 ML: 400; 400; 40 SUSPENSION ORAL at 15:50

## 2018-11-24 RX ADMIN — ACETAMINOPHEN 650 MG: 325 TABLET, FILM COATED ORAL at 21:25

## 2018-11-24 ASSESSMENT — ACTIVITIES OF DAILY LIVING (ADL)
LAUNDRY: WITH SUPERVISION
DRESS: INDEPENDENT
GROOMING: INDEPENDENT
ORAL_HYGIENE: INDEPENDENT

## 2018-11-24 NOTE — PLAN OF CARE
Problem: Cognitive Impairment (Psychotic Signs/Symptoms) (Adult)  Goal: Improved Thought Clarity/Organization (Psychotic Signs/Symptoms)  Outcome: Improving     Patient spent much of shift out in milieu, but minimal interaction with peers.  Patient approached desk early in shift, appearing anxious; but patient unable to identify stressor.   Pt offered and took prn with good results.  Patient appears to be responding to internal stimuli as well as visual hallucinations.  Patient spent latter part of shift [facility].

## 2018-11-24 NOTE — PROGRESS NOTES
"Received pt asleep in room, however she awoke around 0215. Pt was observed drinking large quantities of water over a course of several hours, refilling her cup about once every 15 minutes. Pt sat in chair in lounge area and stared towards the desk/down the freedman. When encouraged to try getting some sleep, pt said \"No, I'm fine here\". Pt complained of pain in both legs, pointing to her legs around the knees and calves, requested medication for pain.     Pt was encouraged to get some sleep on multiple occasions throughout the night, but often ignored staff by looking away and saying nothing. Pt endorsed many somatic complaints, including pain in legs and itchiness around ears. Was observed staring at staff-- when asked if we could help in any way she would say no and walk away.     Pt slept at least 3 hours tonight  "

## 2018-11-24 NOTE — PROGRESS NOTES
"Pt spent most of the shift pacing the halls. Upon checking-in, pt denied experiencing any hallucinations but did appear to be responding. She stated that she has been sleeping well despite reports of her sleeping very little. She reported feeling \"sad\" for the \"first time ever\".  She also reported feeling scared but did not elaborate any further on it. She denied SI and SIB.        11/24/18 1147   Behavioral Health   Hallucinations appears responding;other (see comment)  (pt denies )   Thinking distractable;paranoid   Orientation person: oriented;place: oriented   Memory baseline memory   Insight poor   Judgement impaired   Eye Contact at examiner   Affect blunted, flat   Mood mood is calm   Physical Appearance/Attire attire appropriate to age and situation   Hygiene neglected grooming - unclean body, hair, teeth   Suicidality other (see comments)  (pt denies )   1. Wish to be Dead No   2. Non-Specific Active Suicidal Thoughts  No   Self Injury other (see comment)  (pt denies )   Elopement (none stated or observed )   Activity withdrawn;restless   Speech coherent;other (see comments)  (slow to respond )   Activities of Daily Living   Hygiene/Grooming independent   Oral Hygiene independent   Dress independent   Laundry with supervision   Room Organization independent     "

## 2018-11-24 NOTE — PROGRESS NOTES
"Pt has been sitting up in a chair in the [facility], for much of the night. She has slept off and on. She has not wanted to rest in her room. Pt has had several somatic complaints; constipation, legs hurting, R ear itching, minute amt of dried blood on a tissue from her nose. She also appears guarded and said \"yes,\" when asked if she was hearing voices. Pt was given Zyprexa, senna, and tylenol; refused Vaseline for nasal dryness. Reassurance and support given. She slept 3 hours. Will pass pt concerns on to day shift in report.  "

## 2018-11-25 PROCEDURE — 12400007 ZZH R&B MH INTERMEDIATE UMMC

## 2018-11-25 PROCEDURE — 25000132 ZZH RX MED GY IP 250 OP 250 PS 637: Performed by: STUDENT IN AN ORGANIZED HEALTH CARE EDUCATION/TRAINING PROGRAM

## 2018-11-25 PROCEDURE — 25000132 ZZH RX MED GY IP 250 OP 250 PS 637

## 2018-11-25 RX ADMIN — OLANZAPINE 10 MG: 10 TABLET, FILM COATED ORAL at 23:42

## 2018-11-25 RX ADMIN — OLANZAPINE 10 MG: 10 TABLET, FILM COATED ORAL at 22:48

## 2018-11-25 RX ADMIN — OLANZAPINE 10 MG: 10 TABLET, FILM COATED ORAL at 02:19

## 2018-11-25 RX ADMIN — ACETAMINOPHEN 650 MG: 325 TABLET, FILM COATED ORAL at 02:19

## 2018-11-25 RX ADMIN — HYDROXYZINE HYDROCHLORIDE 25 MG: 25 TABLET, FILM COATED ORAL at 23:02

## 2018-11-25 RX ADMIN — OLANZAPINE 10 MG: 10 TABLET, FILM COATED ORAL at 16:03

## 2018-11-25 RX ADMIN — TRAZODONE HYDROCHLORIDE 50 MG: 50 TABLET ORAL at 00:41

## 2018-11-25 RX ADMIN — Medication: at 02:19

## 2018-11-25 RX ADMIN — ACETAMINOPHEN 650 MG: 325 TABLET, FILM COATED ORAL at 12:02

## 2018-11-25 RX ADMIN — SENNOSIDES AND DOCUSATE SODIUM 2 TABLET: 8.6; 5 TABLET ORAL at 08:05

## 2018-11-25 RX ADMIN — Medication: at 22:52

## 2018-11-25 ASSESSMENT — ACTIVITIES OF DAILY LIVING (ADL)
DRESS: INDEPENDENT
ORAL_HYGIENE: INDEPENDENT
GROOMING: INDEPENDENT

## 2018-11-25 NOTE — PROGRESS NOTES
Pt having some somatic complaints again tonight-c/o constipation, legs hurting, and headache. Pt's hands were noted to be a bit tremulous, early in the shift; stated she felt cold-blankets given.She is quite delayed in her responses, and tends to stare, ? when processing. She is guarded, and became slightly edgy briefly-when she continued to come to and stand at the desk. She then said she did not want any medication. Shortly after, pt did request topical cream for leg pain; tylenol and Zyprexa was also given. Trazodone *2 was given before this, with little effectiveness. She was led back to her room after she [facility]. Pt has remained in her room since about 0345.

## 2018-11-25 NOTE — PROGRESS NOTES
Patient began following female peer as peer was walking freedman and lounge.   Pt accepted redirection to exercise on bike and offered/ given prn Zyprexa.   Patient remained calm throughout shift, but did c/o sore legs.   Pt given Vinod-heriberto and tylenol with moderate effect.

## 2018-11-25 NOTE — PROGRESS NOTES
Pt was present in the milieu, pacing the hallway.  Staring, following staff and other patients in the milieu.  Tearful this morning, then took a short nap.  Appears to be responding to internal stimuli, no stated SI, or SIB.  Pt ate breakfast, and lunch.

## 2018-11-25 NOTE — PROGRESS NOTES
"Writer received pt awake, pt sat in freedman area by pulling a chair next to the entrance to her room. Pt observed staring down the hallway, and seemed to be watching desk area/staff walking by. Pt complained on one occasion of stomach pain. Pt observed to be shaking on another occasion--when asked if she knew why she was shaking she said \"I'm cold\", also complained of inability to sleep; pt given blanket by writer and offered sleep medication by nurse, which she took and went to lay down in room. Pt did not remain in room for more than 15 mins before coming back out to sit in freedman.     Pt observed hovering around the desk area very often, when asked how we can help she will often take several prompts before responding. Pt complains of many somatic symptoms (headaches, leg pain, being cold, etc.), seems forgetful(i.e. Does not seem to remember showers happen after 0730 despite being told so by writer the last two nights), easily distracted, paces freedman and lounge. Pt continues to complain of pain in legs despite medication. Pt drank a lot of water, about a cup every 15-30 minutes.    Pt fell asleep around 0415 and remained asleep for the rest of the night. Pt slept at least 2.5 hours tonight.   "

## 2018-11-26 LAB — INTERPRETATION ECG - MUSE: NORMAL

## 2018-11-26 PROCEDURE — 99231 SBSQ HOSP IP/OBS SF/LOW 25: CPT | Mod: GC | Performed by: PSYCHIATRY & NEUROLOGY

## 2018-11-26 PROCEDURE — 25000132 ZZH RX MED GY IP 250 OP 250 PS 637: Performed by: STUDENT IN AN ORGANIZED HEALTH CARE EDUCATION/TRAINING PROGRAM

## 2018-11-26 PROCEDURE — 25000132 ZZH RX MED GY IP 250 OP 250 PS 637: Performed by: PSYCHIATRY & NEUROLOGY

## 2018-11-26 PROCEDURE — 12400007 ZZH R&B MH INTERMEDIATE UMMC

## 2018-11-26 RX ORDER — ERGOCALCIFEROL 1.25 MG/1
50000 CAPSULE, LIQUID FILLED ORAL
Status: DISCONTINUED | OUTPATIENT
Start: 2018-11-26 | End: 2019-01-17

## 2018-11-26 RX ADMIN — HYDROXYZINE HYDROCHLORIDE 25 MG: 25 TABLET, FILM COATED ORAL at 04:28

## 2018-11-26 RX ADMIN — ERGOCALCIFEROL 50000 UNITS: 1.25 CAPSULE ORAL at 18:53

## 2018-11-26 RX ADMIN — OLANZAPINE 10 MG: 10 TABLET, FILM COATED ORAL at 21:09

## 2018-11-26 RX ADMIN — OLANZAPINE 10 MG: 10 TABLET, FILM COATED ORAL at 04:28

## 2018-11-26 RX ADMIN — SENNOSIDES AND DOCUSATE SODIUM 2 TABLET: 8.6; 5 TABLET ORAL at 18:53

## 2018-11-26 ASSESSMENT — ACTIVITIES OF DAILY LIVING (ADL)
DRESS: INDEPENDENT
GROOMING: PROMPTS
ORAL_HYGIENE: PROMPTS
LAUNDRY: WITH SUPERVISION

## 2018-11-26 NOTE — PROGRESS NOTES
Writer has called in petition for civil commitment into [facility] and a screener will be assigned to come interview patient.

## 2018-11-26 NOTE — PROGRESS NOTES
"  ----------------------------------------------------------------------------------------------------------  [facility], [place]   Psychiatric Progress Note  Hospital Day #5        Interim History:   Sleep: 4.5  Side effects to medication: denies  Groups: demonstrating poor attention  Acute events overnight: Pt has not required locked seclusion or restraints in the past 24 hours to maintain safety; please refer to RN documentation for further details.    Staff Report:Patient spent much of morning in her room appearing to sleep.  Patient did eat well at breakfast.  Patient was in milieu at lunch and ate well.  Patient's father came in for a meeting and patient visited with him some. Pt took PRN zyprexa 10mg x 4 hxdroxizine 25mg x 1    Subjective:   The  treatment team interview today was held in, patients room but pt had difficulty following conversation and giving reasoned anseres.  Had repeated sterotypic movements.  Writer me with father who says patient has not been sleeping for weeks and became paranoid he was drugging her in her food, had refused her medication and was walking in the middle of the street on her way to get fries from a fast food restaurant because she was suspicious of her family.  They tried to take her to the hospital but she refused so 911 was called.         Psychiatric Examination:   /85  Pulse 127  Temp 99.2  F (37.3  C) (Oral)  Resp 16  Ht 1.702 m (5' 7\")  Wt 59 kg (130 lb)  SpO2 99%  BMI 20.36 kg/m2  Weight is 130 lbs 0 oz  Body mass index is 20.36 kg/(m^2).    Appearance:  awake, alert, poorly groomed and slightly unkempt  Attitude:  guarded  Eye Contact:  intense  Mood:  anxious  Affect:  intensity is flat  Speech:  increased speech latency, decreased prosody and paucity of speech  Psychomotor Behavior:  no evidence of tardive dyskinesia, dystonia, or tics  Thought Process:  disorganized  Associations:  loosening of associations " present  Thought Content:  no evidence of suicidal ideation or homicidal ideation  Insight:  limited  Judgment:  poor  Oriented to:  time, person, and place  Attention Span and Concentration:  limited  Recent and Remote Memory:  intact  Language:: fleuent  Fund of Knowledge: low-normal  Muscle Strength and Tone: normal  Gait and Station: Normal        Recent Labs:   Laboratory/Imaging:  - COMP, CBC, TSH, lipids WNL except albumin and vit D low, hematocrit low EMERALD negative  No results found for this or any previous visit (from the past 24 hour(s)).    Allergies:     Allergies   Allergen Reactions     Septra [Sulfamethoxazole W/Trimethoprim]        Review of systems:     The Review of Systems is negative other than noted in the HPI      Hospital Course:   Dannie Davis was admitted to the [facility] to station 22. The patient was placed under status 15 to ensure patient safety.  Symptoms were monitored, a safe environment and therapeutic milieu were maintained, and group participation was encouraged.       Assessment   This is a 23 year old female with Psychosis NEC who presented on 11/21/2018 in decompensated state and was danger to herself due to her MI.      Currently requires inpatient psychiatric hospitalization for medication management and stabilization. On 72 h hold    (F29) Psychosis, unspecified psychosis type (H)      Plan   Legal Status: 72-h Hold signed on 11/25    Safety Assessment:   Checks: Status 15  Precautions: Assault  Elopement  Behavioral Orders   Procedures     Assault precautions     Code 1 - Restrict to Unit     Elopement precautions     Routine Programming     As clinically indicated     Single Room     Status 15     Every 15 minutes.         Patient will be treated in therapeutic milieu with appropriate individual and group therapies as described.    Disposition:  Pending clinical stabilization. Anticipated discharge to home or IRTS      Medications:       OLANZapine  10  mg Oral At Bedtime      PRN:  acetaminophen, alum & mag hydroxide-simethicone, hydrOXYzine, menthol (Topical Analgesic) 2.5%, OLANZapine **OR** OLANZapine, senna-docusate, traZODone    Medication Changes:   start vit D supplememnt 50k units q7days    The risks, benefits, alternatives and side effects have been discussed and are understood by the patient    Attestation:  This patient has been seen and evaluated by me, Mauro Vann.  I have discussed this patient with the house staff team including the resident and medical student if present and I agree with the findings and plan in this note.    I have reviewed today's vital signs, medications, labs and imaging. Mauro Vann

## 2018-11-26 NOTE — PROGRESS NOTES
"Received pt awake, but pt was easily redirected to sleep early on, asleep by 0000. Pt reawoke around 0345 and proceeded to hover around the desk--when asked how we could help pt made no response and walked away from staff into lounge area. Pt was initially unable to express what needs she wanted addressed. Pt visibly shaking,states she is cold when asked by staff if she knows why she was shaking. Pt offered blanket/increase room temp, encouraged to sleep where it is warm--pt makes no response and walks away from staff. Writer notices pt almost always ignores and walks away from staff whenever she's encouraged to sleep.     Pt observed interacting with another pt--other pt frequently plays \"cartaker\", I.e. Offering to get her things, talk with her, told her to go to sleep on one occasion. Pt (subject of this note) seems to listen to/takes directives from him (other pt).   Pt makes somatic complaints of pain in legs/stomache, itchiness. Writer observed that her skin seems very dried out--writer offered her lotion but pt declined to have it. Pt encouraged to return to room to rest, but pt instead sat in dining room area and stared down the freedman towards desk.    Pt slept at least 4.5 hours tonight.   "

## 2018-11-26 NOTE — PROGRESS NOTES
Pt ended up sleeping in the lounge; refused to rest in her room. She declined senna-after having c/o re stomach discomfort-as has past 2 nights, reporting constipation. She also declined Zyprexa, but ended up agreeing to taking it *2, also vistaril. Pt needed to be redirected away from following a male pt around the unit. She also said her neck was stiff, after sleeping in a chair in the lounge.

## 2018-11-26 NOTE — PROGRESS NOTES
"Writer spoke with pt's father and mother. Father noted that pt responds well to prompts in regards to hygiene. Father also noted that pt displayed this catatonic-like behavior one year ago after having what he reported as a \"nervous breakdown\".  Father also noted that pt has extra clothes and toiletries in her locker to use. Father stated that he would like to visit pt tomorrow afternoon since he has to work tomorrow evening during visiting hours.    "

## 2018-11-26 NOTE — PROGRESS NOTES
Patient spent much of morning in her room appearing to sleep.  Patient did eat well at breakfast.  Patient was in milieu at lunch and ate well.  Patient's father came in for a meeting and patient visited with him some.  Patient walked in freedman in the afternoon, often following peers too close when walking.  patien tbegan sobbing in the afternoon and threw herself on the floor in front of the desk.  Patient did not follow staff direction and was unwilling/unable to talk with staff.  Patient did stop sobbing and went to her room after a short time. Patient denies SI/SIB.     11/26/18 1440   Behavioral Health   Hallucinations appears responding   Thinking distractable;poor concentration   Orientation person: oriented   Memory baseline memory   Insight poor   Judgement impaired   Eye Contact at examiner   Affect blunted, flat;sad   Mood labile   Physical Appearance/Attire attire appropriate to age and situation   Hygiene neglected grooming - unclean body, hair, teeth   Suicidality other (see comments)  (denies)   1. Wish to be Dead No   2. Non-Specific Active Suicidal Thoughts  No   Self Injury other (see comment)  (denies)   Activity withdrawn;hyperactive (agitated, impulsive)   Speech pressured   Medication Sensitivity no stated side effects   Psychomotor / Gait paces   Psycho Education   Type of Intervention 1:1 intervention   Response participates with encouragement   Hours 0.5

## 2018-11-26 NOTE — PLAN OF CARE
Problem: Mental State/Mood Impairment (Psychotic Signs/Symptoms) (Adult)  Goal: Improved Mental State/Mood (Psychotic Signs/Symptoms)  Outcome: Declining    Patient out in milieu, following select peers/staff at times.   Patient approached desk after asking to speak with her nurse, but failed to speak despite encouragement.  Instead, patient walked away. Writer offered patient prn Zyprexa which she took.  Patient had episode in which she burst into tears when paged for phone call, which she did not take.  Patient observed on far end of freedman away from parents during visiting hour.   When writer encouraged patient to visit, patient ademantly refused.   Patient also ademantly refused to take HS Zyprexa.  Instead, patient retired to room and has remained in bed thus far.

## 2018-11-27 PROCEDURE — 25000132 ZZH RX MED GY IP 250 OP 250 PS 637: Performed by: STUDENT IN AN ORGANIZED HEALTH CARE EDUCATION/TRAINING PROGRAM

## 2018-11-27 PROCEDURE — 99231 SBSQ HOSP IP/OBS SF/LOW 25: CPT | Mod: GC | Performed by: PSYCHIATRY & NEUROLOGY

## 2018-11-27 PROCEDURE — 12400007 ZZH R&B MH INTERMEDIATE UMMC

## 2018-11-27 PROCEDURE — 25000132 ZZH RX MED GY IP 250 OP 250 PS 637

## 2018-11-27 RX ORDER — OLANZAPINE 10 MG/1
20 TABLET ORAL AT BEDTIME
Status: DISCONTINUED | OUTPATIENT
Start: 2018-11-27 | End: 2018-11-28

## 2018-11-27 RX ADMIN — TRAZODONE HYDROCHLORIDE 50 MG: 50 TABLET ORAL at 02:09

## 2018-11-27 RX ADMIN — OLANZAPINE 20 MG: 10 TABLET, FILM COATED ORAL at 19:41

## 2018-11-27 RX ADMIN — HYDROXYZINE HYDROCHLORIDE 25 MG: 25 TABLET, FILM COATED ORAL at 02:09

## 2018-11-27 RX ADMIN — OLANZAPINE 10 MG: 10 TABLET, FILM COATED ORAL at 05:27

## 2018-11-27 ASSESSMENT — ACTIVITIES OF DAILY LIVING (ADL)
ORAL_HYGIENE: PROMPTS
DRESS: STREET CLOTHES;PROMPTS
DRESS: STREET CLOTHES;PROMPTS
HYGIENE/GROOMING: PROMPTS
ORAL_HYGIENE: PROMPTS
GROOMING: PROMPTS

## 2018-11-27 NOTE — PROGRESS NOTES
[facility] called and they have supported our petition for commitment and will send over the court hold tomorrow.

## 2018-11-27 NOTE — PROGRESS NOTES
"    ----------------------------------------------------------------------------------------------------------  [facility], [place]   Psychiatric Progress Note  Hospital Day #6     Interim History:   The patient's care was discussed with the treatment team and chart notes were reviewed.    Sleep: 1.5 hrs  Scheduled Medications:  Olanzapine 10 mg at bedtime   Vitamin D2 50,000 Units Q7 days  PRN Medications: Olanzapine x1, hydroxyzine x1, trazodone x1  Staff Report: Continues to sleep poorly, have paranoid thoughts, and reportedly visual and auditory hallucinations. Fearful of going to the bathroom alone.      Patient Interview: Ms. Davis was interviewed in her room. Today, she continues to be fearful and tearful when discussing her father. She recalls her prior commitment order obtained during her hospitalization at [facility] and does not want to proceed with a similar process again. She endorses ongoing B/L leg pain and points to her head when asked is he has had pain. No muscle stiffness. No abdominal pain or constipation. No additional questions at this time.    The risks, benefits, alternatives and side effects of any medication changes have been discussed and are understood by the patient and other caregivers.    Review of systems:   Pertinent ROS noted in HPI.          Allergies:     Allergies   Allergen Reactions     Septra [Sulfamethoxazole W/Trimethoprim]             Psychiatric Examination:   /85  Pulse 127  Temp 98  F (36.7  C)  Resp 16  Ht 1.702 m (5' 7\")  Wt 59.4 kg (131 lb)  SpO2 99%  BMI 20.52 kg/m2  Weight is 131 lbs 0 oz  Body mass index is 20.52 kg/(m^2).    Appearance:  Awake, alert. Malodorous.  Attitude:  cooperative at the beginning, guarded   Eye Contact: Intense.  Mood:  \"good\"  Affect:  Labile - calm when answering most questions, tearful and afraid when discussing her father.   Speech:   Delayed response to questions.   Psychomotor Behavior:  no " evidence of tardive dyskinesia, dystonia, or tics, tremor observed in B/L hands, stereotypic movements of rubbing chin, playing with hair   Thought Process:  Initially, she had a logical and linear thought process with simple questioning but later her process becamre more illogical and she was unable to answer questions  Associations:  no loose associations  Thought Content:  Reported auditory and visual hallucinations  Insight:  limited  Judgment:  poor  Oriented to: Oriented to place, month and year.  Attention Span and Concentration:  limited  Recent and Remote Memory:  difficult to assess  Language: speaks fluent english  Fund of Knowledge: difficult to assess  Muscle Strength and Tone: normal  Gait and Station: Normal         Labs:   No new labs.       Assessment    Diagnostic Impression:  Ms. Davis is a 23 y.o. F with a psychiatric history of MDD with psychotic features diagnosed during hospitalization at [facility]  4/29-5/31/2017 that presented to the [facility] ED via EMS on 11/21/2018 due to psychosis in the setting of medication non-adherence. Her primary presentation is psychosis, as evidenced by reported and witnessed visual and auditory hallucinations including the belief that her father is standing and speaking to her in the hospital bathroom, delusions that her father is trying to poison her food, stereotypic hand movements, writhing leg movements, echopraxia, and delayed speech response to questioning.  She also continues to have poor sleep, with notable worsening last night, although this seems to be attributable to paranoid thinking about her father preventing her from adequate rest vs. acute zeb. Given her prior diagnosis of MDD with psychosis and her current presentation with acute psychosis in the absence of depressive sxs, a thought disorder seems to be the most likely pathology. Ddx includes schizoaffective disorder and less likely bipolar depression or MDD with psychotic  features.    Hospital course: Ms. Davis was admitted to Station 22 on a 72 hour hold. Treatment team filed for commitment and [facility], currently pending. Olanzapine was started and uptitrated. PRN hydroxyzine and trazodone initiated.    Medical Course  CMP, lipid panel, TSH, B12/folate, CBC, sed rate, lyme, treponema, HIV, EMERALD, ceruloplasmin, ECG WNL. Vitamin D low    Plan   Principal Diagnosis:   # Psychosis, unsepcified    Psychotropic Medications:  Modify:  - Increase scheduled olanzapine to 20 mg at bedtime    Continue:  - Olanzapine 10 mg Q2H PRN  - Hydroxyzine 25mg Q4H PRN  - Trazodone 50mg at bedtime PRN  - Acetaminophen PRN  - Menthol Topical Analgesic Q6H PRN  - Mylanta/Maalox QID PRN  - Senna-Docusate BID PRN    Patient will be treated in therapeutic milieu with appropriate individual and group therapies as described.      Medical diagnoses to be addressed this admission:    # Vitamin D Deficiency  Noted on hospital admission screening.  - Continue ergocalciferol 50,000 Units Q7 days    Data: No new  Consults: none    Legal Status: 72-h Hold signed on 11/21/2018      Safety Assessment:   Behavioral Orders   Procedures     Assault precautions     Code 1 - Restrict to Unit     Elopement precautions     Routine Programming     As clinically indicated     Single Room     Status 15     Every 15 minutes.       Disposition: TBD pending stabilization.    Kelli Aguilar, MS3    I have reviewed and edited the documentation recorded by the medical student.  This documentation accurately reflects the services I personally performed and treatment decisions made by me in consultation with the attending physician Mauro Vann MD.    Maximiliano Bates MD  Psychiatry PGY-1  [phone]    Attestation:  This patient has been seen and evaluated by me, Mauro Vann.  I have discussed this patient with the house staff team including the resident and medical student and I agree with the findings and plan in this note.    I have  reviewed today's vital signs, medications, labs and imaging. Mauro Vann MD

## 2018-11-27 NOTE — PROGRESS NOTES
Starting around middle morning patient refused to respond to this writer, would brush this writer off. Around 1430 patient starting sobbing while walking in the hallway. Refused to talk about what was bothering her, just kept walking and crying. Refused Zyprexa. After approximately 5-10 minutes stopped crying and started pacing quickly up and down the hallway for several minutes.

## 2018-11-27 NOTE — PROGRESS NOTES
"Rec'd pt. sleeping though awakened at approx MN and has remained wakeful since except for approx  20 to 30 minutes at which time she fell asleep in the lounge area.  Needs attended proactively and pt. kept as comfortable as possible.  Medicated at approx 0209 w/ Trazadone for sleep and Hydroxyzine for anxiety  w/o any observed effectiveness.  Spends long periods of time sitting in the freedman o/s her room door.  Paranoid and fearful of going in to her bathroom alone and required much supportive intervention and/or accompaniment  from staff in order to toilet.  Unable to remain in bed though made comfortable there repeatedly and reassured of her safety on the unit.  Stands at nurses station w/ distressed staring at staff.  Delayed responses w/ very few words.  Zyprexa encouraged and refused x's 2, though eventually willing to take just before 0530.  Admitted to experiencing visual hallucinations at this time.  Endorses that she is  seeing \"my dad who is very evil\".  Further admitted that she is very scared of her father:  \"He is very controlling\" but declined or is  unable to expound re: same.  Again assisted w/ measures conducive to sleep and appears to be sleeping as of 0545 w/ regular non-labored respirations.  Hygiene is grossly neglected w/ offensive body odor.  Will continue to educate/encourage hygienic measures to help promote an improved sense of health and well being.  Continuing to monitor.  Safe, therapeutic environment maintained.         "

## 2018-11-27 NOTE — PLAN OF CARE
"Problem: Cognitive Impairment (Psychotic Signs/Symptoms) (Adult)  Goal: Improved Thought Clarity/Organization (Psychotic Signs/Symptoms)  Outcome: No Change  Writer checked in with patient and patient answered all the questions. Many of patient's answers were delayed. Denies depression and anxiety although appears depressed and anxious. Denies suicidal thoughts. States has intermittent hallucinations of seeing \"my dad\". Hears voices of someone giving her orders which patient states is intermittent. \"I'm afraid of my dad\". Patient would stare at writer during check in.  Writer encourage patient to take a shower and wash clothes because has a body odor which patient agreed to do. When writer brought patient clean scrubs, towels etc for shower patient walked away from writer and refused to take a shower. Patient also admitted was constipated but refused prn Senakot. \"I want to go home\". Writer told patient some of the things she needs to be doing to go home are take a shower regularly, go to groups, needs to be sleeping at night. Patient walked away from writer. Has been walking in the lounge and hallway frequently this morning. Appears to be responding to internal stimuli.       "

## 2018-11-27 NOTE — PLAN OF CARE
Problem: Cognitive Impairment (Psychotic Signs/Symptoms) (Adult)  Intervention: Promote Thought Clarity/Organization  Pt. appears to have a blunted affect.  Pt. will stand very close to this writer and not speak, just stare for one to two minutes.  When staff attempts to engage with the pt. the pt. does not respond verbally, will eventually walk away.  Pt. was not able to engage in a conversation.  Pt. seems preoccupied during the shift.

## 2018-11-28 LAB
ALBUMIN UR-MCNC: NEGATIVE MG/DL
APPEARANCE UR: CLEAR
BILIRUB UR QL STRIP: NEGATIVE
COLOR UR AUTO: ABNORMAL
GLUCOSE UR STRIP-MCNC: NEGATIVE MG/DL
HGB UR QL STRIP: NEGATIVE
KETONES UR STRIP-MCNC: NEGATIVE MG/DL
LEUKOCYTE ESTERASE UR QL STRIP: NEGATIVE
MUCOUS THREADS #/AREA URNS LPF: PRESENT /LPF
NITRATE UR QL: NEGATIVE
PH UR STRIP: 5 PH (ref 5–7)
RBC #/AREA URNS AUTO: 1 /HPF (ref 0–2)
SOURCE: ABNORMAL
SP GR UR STRIP: 1 (ref 1–1.03)
SQUAMOUS #/AREA URNS AUTO: 1 /HPF (ref 0–1)
UROBILINOGEN UR STRIP-MCNC: NORMAL MG/DL (ref 0–2)
WBC #/AREA URNS AUTO: 1 /HPF (ref 0–5)

## 2018-11-28 PROCEDURE — 12400007 ZZH R&B MH INTERMEDIATE UMMC

## 2018-11-28 PROCEDURE — 25000132 ZZH RX MED GY IP 250 OP 250 PS 637: Performed by: STUDENT IN AN ORGANIZED HEALTH CARE EDUCATION/TRAINING PROGRAM

## 2018-11-28 PROCEDURE — G0177 OPPS/PHP; TRAIN & EDUC SERV: HCPCS

## 2018-11-28 PROCEDURE — 25000132 ZZH RX MED GY IP 250 OP 250 PS 637

## 2018-11-28 PROCEDURE — 81001 URINALYSIS AUTO W/SCOPE: CPT | Performed by: STUDENT IN AN ORGANIZED HEALTH CARE EDUCATION/TRAINING PROGRAM

## 2018-11-28 PROCEDURE — 99232 SBSQ HOSP IP/OBS MODERATE 35: CPT | Mod: GC | Performed by: PSYCHIATRY & NEUROLOGY

## 2018-11-28 RX ORDER — OLANZAPINE 10 MG/1
30 TABLET ORAL AT BEDTIME
Status: DISCONTINUED | OUTPATIENT
Start: 2018-11-28 | End: 2018-12-03

## 2018-11-28 RX ORDER — DOCUSATE SODIUM 250 MG
250 CAPSULE ORAL DAILY
Status: DISCONTINUED | OUTPATIENT
Start: 2018-11-28 | End: 2019-01-18 | Stop reason: HOSPADM

## 2018-11-28 RX ADMIN — TRAZODONE HYDROCHLORIDE 50 MG: 50 TABLET ORAL at 03:28

## 2018-11-28 RX ADMIN — ACETAMINOPHEN 650 MG: 325 TABLET, FILM COATED ORAL at 21:25

## 2018-11-28 RX ADMIN — TRAZODONE HYDROCHLORIDE 50 MG: 50 TABLET ORAL at 21:25

## 2018-11-28 RX ADMIN — SENNOSIDES AND DOCUSATE SODIUM 2 TABLET: 8.6; 5 TABLET ORAL at 03:21

## 2018-11-28 RX ADMIN — OLANZAPINE 30 MG: 10 TABLET, FILM COATED ORAL at 20:17

## 2018-11-28 RX ADMIN — DOCUSATE SODIUM 250 MG: 250 CAPSULE, LIQUID FILLED ORAL at 13:42

## 2018-11-28 RX ADMIN — OLANZAPINE 10 MG: 10 TABLET, FILM COATED ORAL at 04:37

## 2018-11-28 RX ADMIN — HYDROXYZINE HYDROCHLORIDE 25 MG: 25 TABLET, FILM COATED ORAL at 03:28

## 2018-11-28 ASSESSMENT — ACTIVITIES OF DAILY LIVING (ADL)
DRESS: INDEPENDENT
DRESS: INDEPENDENT
HYGIENE/GROOMING: SHOWER;INDEPENDENT
ORAL_HYGIENE: INDEPENDENT
ORAL_HYGIENE: INDEPENDENT
GROOMING: INDEPENDENT;SHOWER

## 2018-11-28 NOTE — PLAN OF CARE
Problem: Occupational Therapy Goals (Adult)  Goal: Occupational Therapy Goals  Stand Alone Therapy Goal     INITIAL OT NOTE    Pt attended 2 out of 3 OT groups offered. Pt actively participated in occupational therapy clinic. Pt was able to ask for assistance with prompting, and initiated a structured creative expression task after being oriented to task materials. Upon finishing this simple task, she initiated a slightly more complex creative expression (beading) task. Pt demonstrated good focus and planning, as evidenced by intentionally selecting a pattern for her tasks. She worked at an efficient pace. Minimal interaction with peers observed. She was soft-spoken and difficult to understand. Calm and cooperative throughout this group. Will continue to assess. Initial assessment to be completed upon additional group participation.

## 2018-11-28 NOTE — PROGRESS NOTES
Pt visible in milieu, needing continuous redirection for very poor boundaries.  Continues to stare and follow people.  Pt cried a few times during the day because she misses home and wants to go back home.  Appears to be responding to internal stimuli.  Pt's family visited which she states went well with a smile on her face.  Pt ate meals and showered.  No SI/SIB observed.       11/28/18 1400   Behavioral Health   Hallucinations appears responding   Thinking distractable;paranoid;poor concentration   Orientation person: oriented   Memory other (see comment)  (SALAS)   Insight poor   Judgement impaired   Eye Contact staring;at examiner   Affect blunted, flat   Mood anxious   Physical Appearance/Attire attire appropriate to age and situation   Hygiene well groomed   Suicidality other (see comments)  (none observed)   1. Wish to be Dead No   2. Non-Specific Active Suicidal Thoughts  No   Self Injury other (see comment)  (none observed)   Elopement Statements about wanting to leave   Activity hyperactive (agitated, impulsive)   Speech clear;coherent   Medication Sensitivity no observed side effects   Psychomotor / Gait balanced;steady;paces   Psycho Education   Type of Intervention 1:1 intervention   Response observes from a distance   Activities of Daily Living   Hygiene/Grooming independent;shower   Oral Hygiene independent   Dress independent   Room Organization independent

## 2018-11-28 NOTE — PROGRESS NOTES
"Patient awakened @ 0315. Patient immediately came to workstation and greeted staff. Patient took magazines into the lounge and begin reading. Patient requested and received a small snack. Patient complained about not being able to do #2 and was given prn. A short time later patient was given trazadone to help her return to sleep which had no noticeable effect. A short time later patient begin sobbing loudly and approached the workstation. When questioned as to why she was crying patient stated that she wanted \"glasses\". When nursing attempted to lighten the mood by questioning \"do you want my glasses?\" patient nodded in the affirmative. Patient went to her room where she continued to loudly sob. Nursing gave patient prn medication and patient is currently writing in the lounge. Therapeutic environment maintained. Will continue to monitor and assess.  "
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
## 2018-11-28 NOTE — PROGRESS NOTES
Anabella from [facility] called with court hold for patient as of 1528. Betty Freeman RN

## 2018-11-28 NOTE — PROGRESS NOTES
"Pt was extremely hyperactive in milieu laughing inappropriately, dancing, singly loudly, and spilling water from her cup everywhere. Pt stated \"no\" to writer when redirected to give pt's and staff space. Pt stared blankly at writer when asked CSSI questions and if she were experiencing hallucinations or delusions. Writer observed pt responding to internal stimuli when pacing hallways. Writer observed no signs of self injury but was unable to assess if pt had any feelings/thoughts of hurting self.        11/27/18 2100 Behavioral Health   Hallucinations auditory;visual;appears responding   Thinking distractable;paranoid   Orientation person: oriented   Memory short term   Insight poor   Judgement impaired   Eye Contact staring;at examiner   Affect blunted, flat   Mood depressed;anxious  (appears restless, sad but denies anxiety/dep)   Hygiene neglected grooming - unclean body, hair, teeth   1. Wish to be Dead No   Self Injury other (see comment)  (none observed. )   Activity hyperactive (agitated, impulsive);restless   Activities of Daily Living   Hygiene/Grooming prompts   Oral Hygiene prompts   Dress street clothes;prompts   Room Organization prompts     "

## 2018-11-28 NOTE — PROGRESS NOTES
Re: Commitment    Writer faxed over Guerrero note and Guerrero order to [facility].    Cadence Gonsalez, LPCC, LADC

## 2018-11-29 PROCEDURE — 99232 SBSQ HOSP IP/OBS MODERATE 35: CPT | Mod: GC | Performed by: PSYCHIATRY & NEUROLOGY

## 2018-11-29 PROCEDURE — G0177 OPPS/PHP; TRAIN & EDUC SERV: HCPCS

## 2018-11-29 PROCEDURE — H2032 ACTIVITY THERAPY, PER 15 MIN: HCPCS

## 2018-11-29 PROCEDURE — 25000132 ZZH RX MED GY IP 250 OP 250 PS 637: Performed by: STUDENT IN AN ORGANIZED HEALTH CARE EDUCATION/TRAINING PROGRAM

## 2018-11-29 PROCEDURE — 12400007 ZZH R&B MH INTERMEDIATE UMMC

## 2018-11-29 PROCEDURE — 25000132 ZZH RX MED GY IP 250 OP 250 PS 637

## 2018-11-29 RX ADMIN — TRAZODONE HYDROCHLORIDE 50 MG: 50 TABLET ORAL at 02:46

## 2018-11-29 RX ADMIN — DOCUSATE SODIUM 250 MG: 250 CAPSULE, LIQUID FILLED ORAL at 09:08

## 2018-11-29 RX ADMIN — OLANZAPINE 30 MG: 10 TABLET, FILM COATED ORAL at 20:48

## 2018-11-29 RX ADMIN — HYDROXYZINE HYDROCHLORIDE 25 MG: 25 TABLET, FILM COATED ORAL at 02:47

## 2018-11-29 ASSESSMENT — ACTIVITIES OF DAILY LIVING (ADL)
DRESS: SCRUBS (BEHAVIORAL HEALTH);INDEPENDENT
GROOMING: INDEPENDENT;SHOWER
DRESS: INDEPENDENT
ORAL_HYGIENE: INDEPENDENT
LAUNDRY: WITH SUPERVISION
ORAL_HYGIENE: INDEPENDENT
GROOMING: INDEPENDENT

## 2018-11-29 NOTE — PROGRESS NOTES
"Patient slept 4.75 hours overnight. Patient was much calmer this overnight. Patient did state that her \"father is controlling me...\" patient was also seen dancing on occasion. Patient begin sobbing I  The lounge once again but only for a short period of time and received a prn which appeared effective.  "
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
## 2018-11-29 NOTE — PROGRESS NOTES
Court hearings have been scheduled for Dec. 3rd and Dec. 6th for patient commitment hearings in [facility].
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
## 2018-11-29 NOTE — PLAN OF CARE
Problem: Patient Care Overview  Goal: Team Discussion  Team Plan:   BEHAVIORAL TEAM DISCUSSION    Participants: Dr. Mauro Bates PGY-1  [facility]  Progress: Little improvement continues to display AH/VH  Continued Stay Criteria/Rationale: Patient is awaiting her commitment hearing as the team has petitioned for commitment.Patient will continue on court hold until commitment process is completed.  Medical/Physical: see psychiatry physician progress note for further details   Precautions:   Behavioral Orders   Procedures     Assault precautions     Code 1 - Restrict to Unit     Elopement precautions     Routine Programming     As clinically indicated     Single Room     Status 15     Every 15 minutes.     Plan: Continue to monitor patient's symptoms as a medication regiment is initiated and document changes until proper discharge is set in place.   Rationale for change in precautions or plan: No change has been indicated.

## 2018-11-29 NOTE — PROGRESS NOTES
Pt was extremely tearful at beginning of evening shift and spent time crying loudly in the lounge and then in her room. Writer approached pt in her room to inquire about what was going on. Pt was unresponsive but responded to offering of tea from writer. Pt took a shower this evening and spent some time watching movie in the lounge with other pt's. Pt did not socialize with other patients. No SI/SIB. No concerns reported aside from wanting to go home.        11/28/18 2000   Behavioral Health   Hallucinations appears responding   Thinking distractable;paranoid;poor concentration   Orientation person: oriented   Memory other (see comment)  (SALAS)   Insight poor   Judgement impaired   Eye Contact staring;at examiner   Affect blunted, flat;sad   Mood anxious   Physical Appearance/Attire attire appropriate to age and situation   Hygiene well groomed   Suicidality other (see comments)  (none observed)   1. Wish to be Dead No   2. Non-Specific Active Suicidal Thoughts  No   Self Injury other (see comment)  (none observed)   Elopement Statements about wanting to leave   Activity isolative;withdrawn   Speech clear;coherent   Medication Sensitivity no observed side effects   Psychomotor / Gait balanced;steady   Psycho Education   Type of Intervention 1:1 intervention   Response observes from a distance   Activities of Daily Living   Hygiene/Grooming shower;independent   Oral Hygiene independent   Dress independent   Room Organization independent

## 2018-11-29 NOTE — PROGRESS NOTES
Pt present in milieu, continues to stare and cry from time to time.  One time she was singing a song happily then started crying as she was singing.  Another time she vomited when she was crying.  Appears to be responding to internal stimuli though denies it when asked.  Pt's father visited.  Pt states that she really misses home and would like to go home.  Pt is aware that she's at a hospital but did not know it was [place] and thought it was [facility].  Pt denies SI/SIB.       11/29/18 1400   Behavioral Health   Hallucinations appears responding;other (see comment)  (denies when asked)   Thinking distractable;paranoid;poor concentration   Orientation person: oriented   Memory other (see comment)  (SALAS)   Insight poor   Judgement impaired   Eye Contact staring;at examiner   Affect blunted, flat;sad   Mood anxious   Physical Appearance/Attire attire appropriate to age and situation   Hygiene well groomed   Suicidality other (see comments)  (denies)   1. Wish to be Dead No   2. Non-Specific Active Suicidal Thoughts  No   Self Injury other (see comment)  (denies)   Activity other (see comment)  (present in milieu)   Speech clear   Medication Sensitivity no observed side effects   Psychomotor / Gait balanced;steady   Psycho Education   Type of Intervention 1:1 intervention   Response participates, initiates socially appropriate   Hours 0.5   Treatment Detail check in   Activities of Daily Living   Hygiene/Grooming independent;shower   Oral Hygiene independent   Dress independent   Room Organization independent

## 2018-11-30 PROCEDURE — G0177 OPPS/PHP; TRAIN & EDUC SERV: HCPCS

## 2018-11-30 PROCEDURE — 25000132 ZZH RX MED GY IP 250 OP 250 PS 637

## 2018-11-30 PROCEDURE — 12400007 ZZH R&B MH INTERMEDIATE UMMC

## 2018-11-30 PROCEDURE — 25000132 ZZH RX MED GY IP 250 OP 250 PS 637: Performed by: STUDENT IN AN ORGANIZED HEALTH CARE EDUCATION/TRAINING PROGRAM

## 2018-11-30 PROCEDURE — 99232 SBSQ HOSP IP/OBS MODERATE 35: CPT | Mod: GC | Performed by: PSYCHIATRY & NEUROLOGY

## 2018-11-30 RX ORDER — LORAZEPAM 2 MG/1
2 TABLET ORAL ONCE
Status: COMPLETED | OUTPATIENT
Start: 2018-11-30 | End: 2018-11-30

## 2018-11-30 RX ORDER — LORAZEPAM 1 MG/1
1 TABLET ORAL 3 TIMES DAILY
Status: DISCONTINUED | OUTPATIENT
Start: 2018-11-30 | End: 2018-12-03

## 2018-11-30 RX ADMIN — TRAZODONE HYDROCHLORIDE 50 MG: 50 TABLET ORAL at 02:06

## 2018-11-30 RX ADMIN — HYDROXYZINE HYDROCHLORIDE 25 MG: 25 TABLET, FILM COATED ORAL at 01:01

## 2018-11-30 RX ADMIN — LORAZEPAM 2 MG: 2 TABLET ORAL at 12:34

## 2018-11-30 RX ADMIN — OLANZAPINE 30 MG: 10 TABLET, FILM COATED ORAL at 22:10

## 2018-11-30 RX ADMIN — LORAZEPAM 1 MG: 1 TABLET ORAL at 22:10

## 2018-11-30 RX ADMIN — TRAZODONE HYDROCHLORIDE 50 MG: 50 TABLET ORAL at 01:00

## 2018-11-30 RX ADMIN — DOCUSATE SODIUM 250 MG: 250 CAPSULE, LIQUID FILLED ORAL at 10:45

## 2018-11-30 ASSESSMENT — ACTIVITIES OF DAILY LIVING (ADL)
GROOMING: SHOWER;INDEPENDENT
ORAL_HYGIENE: INDEPENDENT
DRESS: INDEPENDENT
DRESS: INDEPENDENT
LAUNDRY: WITH SUPERVISION
ORAL_HYGIENE: INDEPENDENT
GROOMING: INDEPENDENT

## 2018-11-30 NOTE — PROGRESS NOTES
Pt had a good shift. Pt seemed somewhat distracted at times throughout the day.     Pt had a visit from their father and uncle today. Pt denies SI/SIB. Pt attended groups and was social with peers.        11/30/18 1500   Behavioral Health   Hallucinations denies / not responding to hallucinations   Thinking confused;distractable   Orientation person: oriented;place: oriented   Memory baseline memory   Insight poor   Judgement impaired   Eye Contact at examiner;into space   Affect blunted, flat   Physical Appearance/Attire attire appropriate to age and situation   Hygiene well groomed   Suicidality other (see comments)  (Pt denies )   1. Wish to be Dead No   Self Injury other (see comment)  (None stated, nor observed )   Elopement (No concerns at this time )   Activity other (see comment)  (Participatory )   Speech clear;coherent   Medication Sensitivity no stated side effects;no observed side effects   Psychomotor / Gait balanced;steady   Activities of Daily Living   Hygiene/Grooming independent   Oral Hygiene independent   Dress independent   Laundry with supervision   Room Organization independent   Activity   Activity Assistance Provided independent

## 2018-11-30 NOTE — PROGRESS NOTES
Pt participated in dance/movement therapy; needed some redirection around physical boundaries.  Became sorrowful when discussing her mother & baby.     Pt requested her  know that she would really like her mother to come with her to her court hearings, but she won't be back in the country until December 5.

## 2018-11-30 NOTE — PROGRESS NOTES
"    ----------------------------------------------------------------------------------------------------------  [facility], [place]   Psychiatric Progress Note  Hospital Day #9     Interim History:   The patient's care was discussed with the treatment team and chart notes were reviewed.    Sleep: 5.5 hrs  Scheduled Medications: Received.  PRN Medications: Hydroxyzine x1, trazodone x2.  Staff Report: Ms. Davis spends time in the milieu and participated in dance therapy yesterday. She continues to intermittently become sad (at one point crying so hard she vomited x1) or happy. At one point, she was noted to be start crying while singing happily. Appears to continue to respond to internal stimuli. Would like her mother to attend her court hearings for commitment with her - hearings are scheduled 12/3 and 12/6, mom will return to the  12/5.      Patient Interview: Ms. Davis was interviewed in the patient's room. Dannie stated that today she felt good.  She reported good sleep.  She saw her father yesterday and reported the interaction was \"good.\"  She did not offer any thoughts of paranoia regarding her family members.  When asked if there is anything we could do to improve her stay, she stated \"music makes me sad.\"  When asked to elaborate, she stated that it just made her sad.  There is no particular type of music that made her sad.  She had no additional concerns or questions at that time.    Later in the day, Dannie's uncle and father came to visit and have lunch.  The writer received verbal permission to speak with the uncle and father about Dannie's current  hospitalization and medications from Dannie.  The uncle and father were updated about Dannie's hospital course.  They stated that she was previously hospitalized and had been well controlled with Abilify at home.  They stated she began to decompensate when she stopped taking her Abilify.  All of their concerns and " "questions were answered to the best of the abilities of the care team.    The risks, benefits, alternatives and side effects of any medication changes have been discussed and are understood by the patient and other caregivers.    Review of Systems:   See HPI for pertinent ROS.          Allergies:     Allergies   Allergen Reactions     Septra [Sulfamethoxazole W/Trimethoprim]             Psychiatric Examination:   /83 (BP Location: Right arm)  Pulse 111  Temp 98.4  F (36.9  C) (Oral)  Resp 16  Ht 1.702 m (5' 7\")  Wt 58.7 kg (129 lb 8 oz)  SpO2 100%  BMI 20.28 kg/m2  Weight is 129 lbs 8 oz  Body mass index is 20.28 kg/(m^2).    Appearance: Awake, alert. Good hygiene. Wearing hospital scrubs.  Attitude: Generally calm. Rapidly becomes tearful when asked about family.  Eye Contact: Intense.  Mood: \"Good\"  Affect: Constricted today with minimal lability.  Speech: Delayed speech latency.  Psychomotor Behavior: Stereotypic chin rubbing and adjustment of hair. No evidence of tardive dyskinesia, dystonia or tics.  See below in the physical exam section for additional information  Thought Process: Logical and linear thought process, however difficult to assess at certain points when she was speaking in what the writer believes to be a different language  Associations: loose associations noted.  Thought Content: Ongoing paranoid ideation about family members  Insight: Limited.  Judgment: Poor.   Attention Span and Concentration: Limited - Sometimes requires repeated questions before resposnse is elicited. Reporting confusion when asked about her feelings.  Recent and Remote Memory: Difficult to assess.  Language: Speaking fluent English.  Fund of Knowledge: Difficult to assess.  Muscle Strength and Tone: Appearance is grossly normal.  Gait and Station: Normal.    Brief physical exam  Pre-lorazepam  General: No verigation, no withdrawal, constricted affect, intense gaze,   Neurologic: Tone appropriate, no waxy " flexibility, negative grasp reflex, negative Gegenhal[place], positive Mitgehen, positive automatic obedience, positive echopraxia    30 minutes post lorazepam PO  General: No verbigation, no withdrawal, constricted affect, less intense gaze  Neurologic: Tone appropriate, negative grasp reflex, negative Geg[place], waxy flexibility present, positive Mitgehen, positive automatic obedience, positive echopraxia    75 minutes post lorazepam PO  General: No verbigation, no withdrawal, patient smiled and appeared more interactive, less intense gaze  Neurologic: Tone appropriate, negative grasp reflex, negative Gegenh[place], no waxy flexibility present, negative Mitgehen, positive automatic obedience, positive echopraxia     Labs:   No new results.     Assessment    Diagnostic Impression:  Ms. Davis is a 23 y.o. F with a psychiatric history of MDD with psychotic features diagnosed during hospitalization at [facility] 4/29-5/31/2017 who presented to the [facility] ED via EMS on 11/21/2018 due to psychosis in the setting of medication non-adherence. Her primary presentation is psychosis, as evidenced by reported and witnessed visual and auditory hallucinations including command hallucinations taking the form of her father, delusions that her father is trying to poison her, stereotypic hand movements, writhing leg movements, echopraxia, and delayed speech latency. Continues to demonstrate emotional lability from calm baseline, with rapid transition to and from sobbing or dancing/singing/laughing. Sleep maintenance is poor but improving since admission. Unclear if sleep difficulty is attributable to paranoid thinking preventing her from adequate rest vs. zeb. Given her prior diagnosis of MDD with psychosis and her current presentation with acute psychosis in the absence of depressive sxs, a thought disorder seems to be the most likely pathology. Ddx includes schizoaffective disorder and less likely bipolar  depression or MDD with psychotic features. Current symptoms, along with improvement with lorazepam suggest component of catatonia to her current unspecified condition.     Hospital Course: Ms. Davis was admitted to [facility] on a 72 hour hold. Treatment team filed for commitment and [place]. Commitment hearings are scheduled 12/3/18 and 12/6/18. Olanzapine was started and uptitrated. PRN hydroxyzine and trazodone were initiated. Concern for catatonia with poor olanzapine response. Grier Anjel score 15 pre lorazepam and 11 post lorazepam, with overall improvement in clinical appearance. Scheduled lorazepam started.     Medical Course: CMP, lipid panel, TSH, B12/folate, CBC, sed rate, lyme, treponema, HIV, EMERALD, ceruloplasmin, ECG WNL. Vitamin D low. UA non-infectious. Demonstrated orthostasis 11/29/18 attributable to up-titration of olanzapine.    Plan   Principal Diagnosis:   # Psychosis, Unspecified  # R/o catatonia    Psychotropic Medications:  Modify:  - Lorazepam 2 mg oral once  - Start lorazepam 1mg TID    Continue:  - Olanzapine 30 mg at bedtime  - Olanzapine 10 mg Q2H PRN  - Hydroxyzine 25 mg Q4H PRN  - Trazodone 50 mg at bedtime PRN    Patient will be treated in therapeutic milieu with appropriate individual and group therapies as described.    Medical diagnoses to be addressed this admission:    # Vitamin D Deficiency  Noted on hospital admission screening.  - Continue ergocalciferol 50,000 Units Q7 days    # GI/FEN  - Docusate 250 mg daily  - Mylanta/Maalox QID PRN    # Musculoskeletal Pain  - Acetaminophen PRN  - Menthol Topical Analgesic Q6H PRN    Data: As above.  Consults: None.    Legal Status: Court hold.    Safety Assessment:   Behavioral Orders   Procedures     Assault precautions     Code 1 - Restrict to Unit     Elopement precautions     Routine Programming     As clinically indicated     Single Room     Status 15     Every 15 minutes.       Disposition: TBD pending clinical  stabilization.    Kelli Aguilar, MS3    I have reviewed and edited the documentation recorded by the medical student. This documentation accurately reflects the services I personally performed and treatment decisions made by me in consultation with the attending physician, Mauro Vann MD.    Maximiliano Bates MD  PGY-1 Resident  Pager: [phone]      Attestation:  This patient has been seen and evaluated by me, Mauro Vann.  I have discussed this patient with the house staff team including the resident and medical student and I agree with the findings and plan in this note.    I have reviewed today's vital signs, medications, labs and imaging. Mauro Vann MD

## 2018-11-30 NOTE — PROGRESS NOTES
Behavioral Health  Note   Behavioral Health  Spirituality Group Note     Unit [facility]    Name: Dannie Davis    YOB: 1995   MRN: [id number]    Age: 23 year old     Patient attended -led group, which included discussion of spirituality, coping with illness and building resilience.   Patient attended group for 1.0 hrs.   patient minimally participated, but was respectful to the group process.     Souravchristopher [facility]  Staff    Page [phone]

## 2018-12-01 PROCEDURE — 25000132 ZZH RX MED GY IP 250 OP 250 PS 637

## 2018-12-01 PROCEDURE — 25000132 ZZH RX MED GY IP 250 OP 250 PS 637: Performed by: STUDENT IN AN ORGANIZED HEALTH CARE EDUCATION/TRAINING PROGRAM

## 2018-12-01 PROCEDURE — 12400007 ZZH R&B MH INTERMEDIATE UMMC

## 2018-12-01 RX ADMIN — OLANZAPINE 30 MG: 10 TABLET, FILM COATED ORAL at 20:38

## 2018-12-01 RX ADMIN — LORAZEPAM 1 MG: 1 TABLET ORAL at 09:47

## 2018-12-01 RX ADMIN — Medication: at 10:03

## 2018-12-01 RX ADMIN — LORAZEPAM 1 MG: 1 TABLET ORAL at 14:28

## 2018-12-01 RX ADMIN — LORAZEPAM 1 MG: 1 TABLET ORAL at 20:38

## 2018-12-01 RX ADMIN — Medication: at 14:32

## 2018-12-01 RX ADMIN — DOCUSATE SODIUM 250 MG: 250 CAPSULE, LIQUID FILLED ORAL at 09:47

## 2018-12-01 ASSESSMENT — ACTIVITIES OF DAILY LIVING (ADL)
GROOMING: INDEPENDENT
ORAL_HYGIENE: INDEPENDENT
ORAL_HYGIENE: INDEPENDENT
DRESS: INDEPENDENT
GROOMING: INDEPENDENT
LAUNDRY: WITH SUPERVISION
DRESS: INDEPENDENT

## 2018-12-01 NOTE — PROGRESS NOTES
Pt denies SI/SIB. Pt attended ot group and participated and appeared to enjoy herself cutting onion and avocados. Pt affect was flat.  Pt stated she aches all over and believes it is from not resting well. Pt reports she has no concerns. Pt showered and conditioned hair this evening.      11/30/18 2206   Behavioral Health   Hallucinations denies / not responding to hallucinations   Thinking poor concentration   Orientation person: oriented;place: oriented   Memory baseline memory   Insight poor   Judgement impaired   Eye Contact at examiner   Affect blunted, flat   Hygiene well groomed   Suicidality other (see comments)  (pt denies)   Self Injury other (see comment)  (pt denies)   Activity (present in and out of [place])   Speech clear   Activities of Daily Living   Hygiene/Grooming shower;independent   Oral Hygiene independent   Dress independent   Room Organization independent

## 2018-12-01 NOTE — PROGRESS NOTES
"  -----------------------------------------------------------------------------------------------------------  Psychiatry Cross Cover note      Dannie Davis MRN# [id number]   Age: 23 year old YOB: 1995   LOS: 10 Days          Subjective:     Met with patient briefly to determine level of catatonia and response to treatment. Patient was found on the unit completing a menu. Patient reports that she is doing well. She would like to leave the hospital and work so that she can send money to her mother. Patient became tearful when discussing this. She believes her father is still controlling her.     In regards to her catatonia, patient feels like she is able to move freely. Her muscles feel loose. She did not repeat any sentences. She is sleeping well and is not afraid. She feels safe on the unit.         Objective:    /86  Pulse 120  Temp 99.2  F (37.3  C) (Oral)  Resp 16  Ht 1.702 m (5' 7\")  Wt 58.7 kg (129 lb 8 oz)  SpO2 100%  BMI 20.28 kg/m2  129 lbs 8 oz  Body mass index is 20.28 kg/(m^2).  Mental Status Exam:  Appearance: Alert, dressed in street clothes, appropriately groomed  Attitude: Cooperative  Eye Contact: Intense, fixed at times  Mood: \"Good\"  Affect: Flat  Speech: Normal rate and rhythm  Psychomotor Behavior: Poor spatial boundaries. Catatonic with automatic obedience and staring present. No tremor. No TD  Thought Process: Linear, somewhat illogical at times  Associations: No loose associations   Thought Content: Delusions of control present  Insight: Poor  Judgment: Poor  Attention Span and Concentration: Impaired  Recent and Remote Memory: Impaired   Language: English with appropriate syntax and vocabulary  Fund of Knowledge: Average   Muscle Strength and Tone: Grossly normal  Gait and Station: Grossly normal    Grier Anjel:  Starin  Automatic obedience: 2  Total: 3         Relevent information:     Past Medical History:   Diagnosis Date     Gastroesophageal reflux " disease        Allergies   Allergen Reactions     Septra [Sulfamethoxazole W/Trimethoprim]             Assessment and Plan:     Dannie Davis is a 23 year old female with psychosis and catatonia with concern for underlying diagnosis of schizoaffective disorder who is being evaluated to assess level of catatonia and determine if treatment is effective. Patient started scheduled Ativan yesterday. Her pre-challenge BF was 15 and her post-challenge was 11 with notable staring, automatic obedience, and echopraxia. Today, patient's BF is reduced to 3 with improvement in staring and echopraxia. Patient clearly benefiting from Ativan with continued scheduled dosing being warranted. Will further evaluate patient's response tomorrow to determine if any changes needed. No side effects observed at this point from Ativan, with current dose being adequate.    =============================================================================  Maurilio Camacho DO, MA  PGY-2 Psychiatry Resident  Pager: [phone]

## 2018-12-01 NOTE — PROGRESS NOTES
INITIAL O.T. ASSESSMENT:  Dannie has attended OT reguarly since admission.    Much less labile and upset this date. Was not observed crying or moaning during OT groups. Affect very blunted but brightens occasionally in direct interaction. Able to initiate in simple structured creative activities & ask for help as need. Movements are slow and tentative.      Was given a written self assessment and reported that she completed it and returned it to the , however this writer has no record of it. Pt's goals and needs to be reviewed upon increased interaction.    OT staff explained the purpose of pt being involved in current treatment plan.      Plan: Encourage ongoing attendance as able to meet self-stated goals, implement positive coping skills, engage in graded opportunities for success, and provide assessment ongoing.       11/30/18 1800   Clinical Impression   Affect Flat;Restricted;Fluctuates   Orientation Needs further assessment   Appearance and ADLs General cleanliness observed in most areas   Attention to Internal Stimuli Appears distracted/preoccupied internally   Interaction Skills Interacts appropriately with staff;Interacts appropriately with peers   Ability to Communicate Needs Independent;Needs further assessment   Verbal Content Selective;Needs further assessment   Ability to Maintain Boundaries Maintains appropriate physical boundaries;Maintains appropriate verbal boundaries   Participation Independently participates   Concentration Concentrates 10-20 minutes   Ability to Concentrate With structure;Preoccupied   Follows and Comprehends Directions Independently follows 1 step verbal directions   Memory Needs further assessment   Organization Independently organizes simple tasks;Needs occasional assistance    Decision Making Independent;Follows the leads of peers   Planning and Problem Solving Occasionally needs assist/feedback;Indentifies problems but not alternatives   Ability to Apply and  Learn Concepts Applies within group structure   Frustrations / Stress Tolerance Utilizes coping skills with prompts   Level of Insight Needs further assessment   Self Esteem Takes risks with support and encouragement   Social Supports Needs further assessment

## 2018-12-02 PROCEDURE — 25000132 ZZH RX MED GY IP 250 OP 250 PS 637: Performed by: STUDENT IN AN ORGANIZED HEALTH CARE EDUCATION/TRAINING PROGRAM

## 2018-12-02 PROCEDURE — 12400007 ZZH R&B MH INTERMEDIATE UMMC

## 2018-12-02 RX ADMIN — OLANZAPINE 30 MG: 10 TABLET, FILM COATED ORAL at 20:26

## 2018-12-02 RX ADMIN — Medication: at 09:03

## 2018-12-02 RX ADMIN — LORAZEPAM 1 MG: 1 TABLET ORAL at 13:23

## 2018-12-02 RX ADMIN — DOCUSATE SODIUM 250 MG: 250 CAPSULE, LIQUID FILLED ORAL at 08:59

## 2018-12-02 RX ADMIN — LORAZEPAM 1 MG: 1 TABLET ORAL at 08:59

## 2018-12-02 RX ADMIN — LORAZEPAM 1 MG: 1 TABLET ORAL at 20:26

## 2018-12-02 RX ADMIN — ACETAMINOPHEN 650 MG: 325 TABLET, FILM COATED ORAL at 13:22

## 2018-12-02 ASSESSMENT — ACTIVITIES OF DAILY LIVING (ADL)
ORAL_HYGIENE: INDEPENDENT
ORAL_HYGIENE: INDEPENDENT
DRESS: SCRUBS (BEHAVIORAL HEALTH);STREET CLOTHES
DRESS: INDEPENDENT
LAUNDRY: WITH SUPERVISION
LAUNDRY: WITH SUPERVISION
GROOMING: INDEPENDENT
HYGIENE/GROOMING: INDEPENDENT

## 2018-12-02 NOTE — PLAN OF CARE
Problem: Cognitive Impairment (Psychotic Signs/Symptoms) (Adult)  Goal: Improved Thought Clarity/Organization (Psychotic Signs/Symptoms)  Dannie wanted her medication after her breakfast.  She is filling out her menu now and engaged in unit routine.  She is a little groggy receiving meds but makes herself better understood in speech and seems to understand others after a request for repeat.  She wailed when she was in the shower and is hoping to somehow call her family in [facility] thought her funds are diminished on her phone per report.  Arrangements pursued to try face to face with unit laptop as that service may be without charge for international face to face.

## 2018-12-02 NOTE — PROGRESS NOTES
Pt was visible in the milieu, social with peers. Pt began crying hard after a phone call, she was crying so hard that she began to vomit in her room and walked in the lounge vomiting. Pt assisted staff in cleaning. Pt stated she had been crying because she talked to her mother and heard her daughter on the phone crying. Pt stated she felt upset because another pt disrespected her later in the day. Pt had difficultly remembering that she had showered earlier in the morning, and that she had called her father. Pt denies SI/SIB, hallucinations, and medication side affects.      12/02/18 1409   Behavioral Health   Hallucinations denies / not responding to hallucinations   Thinking distractable;paranoid   Orientation person: oriented;place: oriented   Memory baseline memory   Insight poor   Judgement impaired   Eye Contact at examiner   Affect sad   Mood depressed   Physical Appearance/Attire attire appropriate to age and situation   Hygiene well groomed   Suicidality other (see comments)  (denies)   1. Wish to be Dead No   2. Non-Specific Active Suicidal Thoughts  No   Self Injury other (see comment)  (denies)   Activity restless;other (see comment)  (visible in the milieu )   Speech coherent   Medication Sensitivity no stated side effects;no observed side effects   Psychomotor / Gait balanced;steady   Psycho Education   Type of Intervention 1:1 intervention   Response participates with cues/redirection   Hours 0.5   Activities of Daily Living   Hygiene/Grooming independent   Oral Hygiene independent   Dress independent   Laundry with supervision   Room Organization independent

## 2018-12-02 NOTE — PROGRESS NOTES
Pt denies SI/SIB. PT is paranoid - believes persons are entering her room removing items to include telephone numbers. Pt would like to call mtr and daughter in [place]. She would need to add funds to her phone to do so - this writer stated we could look into obtaining code to make international call. Pt showered this evening. Pt affect was flat.     12/01/18 0244   Behavioral Health   Hallucinations denies / not responding to hallucinations   Thinking paranoid   Orientation person: oriented;place: oriented   Insight poor   Judgement impaired   Eye Contact at examiner   Mood depressed   Hygiene well groomed   Suicidality other (see comments)  (pt denies )   Activity other (see comment)  (attended community meeting/ spoke on the phone)   Activities of Daily Living   Hygiene/Grooming independent   Oral Hygiene independent   Dress independent   Room Organization independent

## 2018-12-03 PROCEDURE — 12400007 ZZH R&B MH INTERMEDIATE UMMC

## 2018-12-03 PROCEDURE — 25000132 ZZH RX MED GY IP 250 OP 250 PS 637: Performed by: STUDENT IN AN ORGANIZED HEALTH CARE EDUCATION/TRAINING PROGRAM

## 2018-12-03 PROCEDURE — 99232 SBSQ HOSP IP/OBS MODERATE 35: CPT | Mod: GC | Performed by: PSYCHIATRY & NEUROLOGY

## 2018-12-03 PROCEDURE — G0177 OPPS/PHP; TRAIN & EDUC SERV: HCPCS

## 2018-12-03 PROCEDURE — 25000132 ZZH RX MED GY IP 250 OP 250 PS 637: Performed by: PSYCHIATRY & NEUROLOGY

## 2018-12-03 PROCEDURE — 25000132 ZZH RX MED GY IP 250 OP 250 PS 637

## 2018-12-03 RX ORDER — LORAZEPAM 2 MG/1
2 TABLET ORAL AT BEDTIME
Status: DISCONTINUED | OUTPATIENT
Start: 2018-12-03 | End: 2019-01-09

## 2018-12-03 RX ORDER — LORAZEPAM 1 MG/1
1 TABLET ORAL 2 TIMES DAILY
Status: DISCONTINUED | OUTPATIENT
Start: 2018-12-03 | End: 2019-01-18 | Stop reason: HOSPADM

## 2018-12-03 RX ORDER — ARIPIPRAZOLE 5 MG/1
5 TABLET ORAL AT BEDTIME
Status: DISCONTINUED | OUTPATIENT
Start: 2018-12-03 | End: 2018-12-04

## 2018-12-03 RX ORDER — OLANZAPINE 15 MG/1
15 TABLET ORAL AT BEDTIME
Status: DISCONTINUED | OUTPATIENT
Start: 2018-12-03 | End: 2018-12-04

## 2018-12-03 RX ADMIN — ARIPIPRAZOLE 5 MG: 5 TABLET ORAL at 20:30

## 2018-12-03 RX ADMIN — LORAZEPAM 2 MG: 2 TABLET ORAL at 20:30

## 2018-12-03 RX ADMIN — OLANZAPINE 15 MG: 15 TABLET, FILM COATED ORAL at 20:30

## 2018-12-03 RX ADMIN — LORAZEPAM 1 MG: 1 TABLET ORAL at 08:18

## 2018-12-03 RX ADMIN — DOCUSATE SODIUM 250 MG: 250 CAPSULE, LIQUID FILLED ORAL at 08:18

## 2018-12-03 RX ADMIN — LORAZEPAM 1 MG: 1 TABLET ORAL at 14:00

## 2018-12-03 RX ADMIN — TRAZODONE HYDROCHLORIDE 50 MG: 50 TABLET ORAL at 03:10

## 2018-12-03 RX ADMIN — HYDROXYZINE HYDROCHLORIDE 25 MG: 25 TABLET, FILM COATED ORAL at 06:48

## 2018-12-03 RX ADMIN — ACETAMINOPHEN 650 MG: 325 TABLET, FILM COATED ORAL at 03:10

## 2018-12-03 RX ADMIN — ERGOCALCIFEROL 50000 UNITS: 1.25 CAPSULE ORAL at 18:32

## 2018-12-03 ASSESSMENT — ACTIVITIES OF DAILY LIVING (ADL)
GROOMING: INDEPENDENT;SHOWER
ORAL_HYGIENE: INDEPENDENT
ORAL_HYGIENE: INDEPENDENT
HYGIENE/GROOMING: INDEPENDENT
DRESS: INDEPENDENT;SCRUBS (BEHAVIORAL HEALTH)
DRESS: SCRUBS (BEHAVIORAL HEALTH);INDEPENDENT

## 2018-12-03 NOTE — PROGRESS NOTES
Pt showered and went to court.  Afterwards asked staff to explain the court papers.  Pt's father visited, ate lunch that father brought.  Pt had a couple episodes of sobbing this shift.  Pt spoke to mother on the phone.  No SI/SIB observed.  Blunted, flat affect.         12/03/18 1400   Behavioral Health   Hallucinations denies / not responding to hallucinations   Thinking distractable   Orientation person: oriented;place: oriented   Memory baseline memory   Insight poor   Judgement impaired   Eye Contact at examiner   Affect blunted, flat;sad   Mood depressed   Physical Appearance/Attire attire appropriate to age and situation   Hygiene well groomed   Suicidality other (see comments)  (none observed)   1. Wish to be Dead No   2. Non-Specific Active Suicidal Thoughts  No   Self Injury other (see comment)  (none observed)   Activity other (see comment)  (visible in milieu)   Speech clear;coherent   Medication Sensitivity no observed side effects   Psychomotor / Gait balanced;steady   Psycho Education   Type of Intervention 1:1 intervention   Response observes from a distance   Activities of Daily Living   Hygiene/Grooming independent;shower   Oral Hygiene independent   Dress independent;scrubs (behavioral health)   Room Organization independent

## 2018-12-03 NOTE — PROGRESS NOTES
Pt was visible in the milieu most of the shift, interacting minimally with other patients. Pt's father visited this evening and she reported that it went well. During 1-1 check in pt became tearful and stated 'I want to go home'. Pt denies all mental health symptoms, appears distracted and disorganized. No behavioral concerns to be noted this shift.        12/02/18 3916   Behavioral Health   Hallucinations denies / not responding to hallucinations   Thinking distractable;poor concentration;paranoid   Orientation person: oriented;place: oriented   Memory baseline memory   Insight poor   Judgement impaired   Eye Contact at examiner   Affect blunted, flat;sad   Mood labile;depressed   Physical Appearance/Attire attire appropriate to age and situation   Hygiene well groomed   Suicidality other (see comments)  (denies)   1. Wish to be Dead No   2. Non-Specific Active Suicidal Thoughts  No   Self Injury other (see comment)  (denies)   Elopement (none)   Activity restless   Speech clear;coherent   Psychomotor / Gait balanced;steady   Psycho Education   Type of Intervention 1:1 intervention   Response participates, initiates socially appropriate   Hours 0.5   Treatment Detail check in   Activities of Daily Living   Hygiene/Grooming independent   Oral Hygiene independent   Dress scrubs (behavioral health);street clothes   Laundry with supervision   Room Organization independent

## 2018-12-03 NOTE — PLAN OF CARE
Problem: Occupational Therapy Goals (Adult)  Goal: Occupational Therapy Goals  Stand Alone Therapy Goal     Dannie  attended 3 of 3 OT groups today. Affect very blunted though occasionally smiles in direct interaction. Appears to have difficulty comprehending certain segments of conversation. Very slow movement.

## 2018-12-03 NOTE — PROGRESS NOTES
"Observd to have slept well till approx 0300 at time pt. woke complaining of discomfort on the top of her tongue.  Visually observed a dark brownish coating on the tongue.  Encouraged good hygienic measures which pt. did - brushed the teeth and tongue well which did  rid the tongue coating.  Had tylenol as well for the discomfort and was given Trazadone for sleep.  Tongue discomfort resolved;  Trazadone ineffective.  On the floor w/intermittent sobbing requiring staff realistic, supportive intervention.  Endorses  that her mother is due to return from [place] Wednesday.  States \"I just want to go home and go to work.\"  Aware and receptive  that she has court this am.  Positively responsive to supportive intervention.  Assisted w/measures conducive to sleep though refusing to try and sleep further at this time.   Sitting on the bed journaling.  Continuing to monitor and support as appropriate.       "

## 2018-12-04 PROCEDURE — 12400007 ZZH R&B MH INTERMEDIATE UMMC

## 2018-12-04 PROCEDURE — G0177 OPPS/PHP; TRAIN & EDUC SERV: HCPCS

## 2018-12-04 PROCEDURE — 25000132 ZZH RX MED GY IP 250 OP 250 PS 637: Performed by: STUDENT IN AN ORGANIZED HEALTH CARE EDUCATION/TRAINING PROGRAM

## 2018-12-04 PROCEDURE — 25000132 ZZH RX MED GY IP 250 OP 250 PS 637

## 2018-12-04 PROCEDURE — 99232 SBSQ HOSP IP/OBS MODERATE 35: CPT | Mod: GC | Performed by: PSYCHIATRY & NEUROLOGY

## 2018-12-04 RX ORDER — BENZTROPINE MESYLATE 1 MG/1
1 TABLET ORAL 2 TIMES DAILY PRN
Status: DISCONTINUED | OUTPATIENT
Start: 2018-12-04 | End: 2019-01-15

## 2018-12-04 RX ORDER — ARIPIPRAZOLE 5 MG/1
10 TABLET ORAL AT BEDTIME
Status: DISCONTINUED | OUTPATIENT
Start: 2018-12-04 | End: 2018-12-05

## 2018-12-04 RX ORDER — OLANZAPINE 10 MG/1
10 TABLET ORAL AT BEDTIME
Status: DISCONTINUED | OUTPATIENT
Start: 2018-12-04 | End: 2018-12-06

## 2018-12-04 RX ADMIN — LORAZEPAM 1 MG: 1 TABLET ORAL at 15:00

## 2018-12-04 RX ADMIN — DOCUSATE SODIUM 250 MG: 250 CAPSULE, LIQUID FILLED ORAL at 09:10

## 2018-12-04 RX ADMIN — Medication: at 06:01

## 2018-12-04 RX ADMIN — LORAZEPAM 1 MG: 1 TABLET ORAL at 09:10

## 2018-12-04 RX ADMIN — ARIPIPRAZOLE 10 MG: 5 TABLET ORAL at 20:50

## 2018-12-04 RX ADMIN — LORAZEPAM 2 MG: 2 TABLET ORAL at 20:50

## 2018-12-04 RX ADMIN — TRAZODONE HYDROCHLORIDE 50 MG: 50 TABLET ORAL at 02:42

## 2018-12-04 RX ADMIN — OLANZAPINE 10 MG: 10 TABLET, FILM COATED ORAL at 20:50

## 2018-12-04 RX ADMIN — ACETAMINOPHEN 650 MG: 325 TABLET, FILM COATED ORAL at 02:16

## 2018-12-04 NOTE — PROGRESS NOTES
Pt visible and appropriately social on milieu this evening. Pt was calm and cooperative with staff and peers. Pt watched movies in the lounge with other patients. No SI/SIB. No concerns reported.        12/03/18 2000   Behavioral Health   Hallucinations denies / not responding to hallucinations   Thinking distractable   Orientation person: oriented;place: oriented   Memory baseline memory   Insight poor   Judgement impaired   Eye Contact at examiner   Affect blunted, flat;sad   Mood depressed   Physical Appearance/Attire attire appropriate to age and situation   Hygiene well groomed   Suicidality other (see comments)  (none reported or observed)   1. Wish to be Dead No   2. Non-Specific Active Suicidal Thoughts  No   Self Injury other (see comment)  (none reported or observed)   Activity other (see comment)  (visible on unit)   Speech clear;coherent   Medication Sensitivity no observed side effects   Psychomotor / Gait balanced;steady   Activities of Daily Living   Hygiene/Grooming independent   Oral Hygiene independent   Dress scrubs (behavioral health);independent   Room Organization independent
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
## 2018-12-04 NOTE — PROGRESS NOTES
Received pt asleep in room. Pt awoke around 0215, complained of pain in her legs and wanted medication for it. While wakeful, pt made frequent requests (about once every 15 minutes) for meds, food, drinks, wanting to chat. Pt was encouraged to try sleeping, which she was able to fall asleep around 0345. Pt woke again around 0530, was observed listening to music while sitting in chair outside room, sometimes pacing, spoke to nurse about wanting medications. Pt encouraged to try more sleep after getting medications/snacks.     Pt slept for 4 hours tonight.

## 2018-12-05 PROCEDURE — 99232 SBSQ HOSP IP/OBS MODERATE 35: CPT | Mod: GC | Performed by: PSYCHIATRY & NEUROLOGY

## 2018-12-05 PROCEDURE — 25000132 ZZH RX MED GY IP 250 OP 250 PS 637: Performed by: STUDENT IN AN ORGANIZED HEALTH CARE EDUCATION/TRAINING PROGRAM

## 2018-12-05 PROCEDURE — G0177 OPPS/PHP; TRAIN & EDUC SERV: HCPCS

## 2018-12-05 PROCEDURE — 12400007 ZZH R&B MH INTERMEDIATE UMMC

## 2018-12-05 RX ORDER — ARIPIPRAZOLE 5 MG/1
10 TABLET ORAL DAILY
Status: DISCONTINUED | OUTPATIENT
Start: 2018-12-05 | End: 2018-12-06

## 2018-12-05 RX ADMIN — OLANZAPINE 10 MG: 10 TABLET, FILM COATED ORAL at 20:36

## 2018-12-05 RX ADMIN — ARIPIPRAZOLE 10 MG: 5 TABLET ORAL at 12:36

## 2018-12-05 RX ADMIN — ACETAMINOPHEN 650 MG: 325 TABLET, FILM COATED ORAL at 13:01

## 2018-12-05 RX ADMIN — DOCUSATE SODIUM 250 MG: 250 CAPSULE, LIQUID FILLED ORAL at 08:46

## 2018-12-05 RX ADMIN — LORAZEPAM 1 MG: 1 TABLET ORAL at 13:01

## 2018-12-05 RX ADMIN — LORAZEPAM 2 MG: 2 TABLET ORAL at 20:36

## 2018-12-05 RX ADMIN — LORAZEPAM 1 MG: 1 TABLET ORAL at 08:46

## 2018-12-05 RX ADMIN — BENZTROPINE MESYLATE 1 MG: 1 TABLET ORAL at 03:18

## 2018-12-05 RX ADMIN — ACETAMINOPHEN 650 MG: 325 TABLET, FILM COATED ORAL at 18:54

## 2018-12-05 ASSESSMENT — ACTIVITIES OF DAILY LIVING (ADL)
DRESS: INDEPENDENT
GROOMING: INDEPENDENT
DRESS: INDEPENDENT
ORAL_HYGIENE: INDEPENDENT
GROOMING: INDEPENDENT
ORAL_HYGIENE: INDEPENDENT
LAUNDRY: WITH SUPERVISION
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
## 2018-12-05 NOTE — PROGRESS NOTES
Pt was present in the milieu for most of the shift walking the halls, watching TV, and attending community meeting. Pt was observed throughout the evening starring blankly at staff and peers for brief periods. Later in the evening pt was observed walking the freedman and suddenly began running down the freedman. Pt was redirected by staff to slow down and not run in the freedman. Pt stopped running and starred blankly at staff redirected them. No SI or SIB was observed.      12/04/18 2133   Behavioral Health   Hallucinations denies / not responding to hallucinations   Thinking distractable;confused   Orientation person: oriented   Memory baseline memory   Insight poor   Judgement impaired   Eye Contact at examiner;staring   Affect blunted, flat   Mood mood is calm   Physical Appearance/Attire attire appropriate to age and situation   Hygiene (adequate)   Suicidality (none observed)   1. Wish to be Dead No   2. Non-Specific Active Suicidal Thoughts  No   Activity (present in milieu)   Speech coherent   Medication Sensitivity no stated side effects;no observed side effects   Psychomotor / Gait steady   Psycho Education   Type of Intervention 1:1 intervention   Response observes from a distance   Hours 0.5   Treatment Detail (Check-in)

## 2018-12-05 NOTE — PLAN OF CARE
"Problem: Occupational Therapy Goals (Adult)  Goal: Occupational Therapy Goals  Stand Alone Therapy Goal     Pt attended 2 out of 3 OT groups offered. Pt actively participated in occupational therapy clinic. Pt was able to ask for assistance as needed, and independently initiated a creative expression task. Pt appeared distractible and disorganized, as evidenced by focusing on a task for about x10 minutes, then attempting to initiate a new task. She spontaneously asked writer if she could look inside closed cupboards, though did not appear to be looking for anything in particular. She asked writer for a key several times, and when writer asked what she needed unlocked, her responses varied from \"something in my room\" and \"my locker.\" Her requests did not appear to have a clear objective. Affect appeared flat. Will continue to assess.      "
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
## 2018-12-05 NOTE — PLAN OF CARE
"Problem: Mental State/Mood Impairment (Psychotic Signs/Symptoms) (Adult)  Goal: Improved Mental State/Mood (Psychotic Signs/Symptoms)  Outcome: Improving  Dannie rios walking about unit clutching tablet and several belongings-more irritable today-blanca requests with demanding manner and at times angry glare when not able to have phone, jacket, etc which has been explained on many days and often today, that she is not able to have on unit-again given warm blanket to wear as shawl to keep warm-Dannie angry with offer of PRN medications angrily pacing for brief period and complaining, \"I am not mentally ill!\"-does attend most grps-          "

## 2018-12-06 PROCEDURE — 99232 SBSQ HOSP IP/OBS MODERATE 35: CPT | Mod: GC | Performed by: PSYCHIATRY & NEUROLOGY

## 2018-12-06 PROCEDURE — 25000132 ZZH RX MED GY IP 250 OP 250 PS 637: Performed by: STUDENT IN AN ORGANIZED HEALTH CARE EDUCATION/TRAINING PROGRAM

## 2018-12-06 PROCEDURE — G0177 OPPS/PHP; TRAIN & EDUC SERV: HCPCS

## 2018-12-06 PROCEDURE — 12400007 ZZH R&B MH INTERMEDIATE UMMC

## 2018-12-06 RX ORDER — OLANZAPINE 10 MG/1
20 TABLET ORAL AT BEDTIME
Status: DISCONTINUED | OUTPATIENT
Start: 2018-12-06 | End: 2018-12-07

## 2018-12-06 RX ADMIN — LORAZEPAM 2 MG: 2 TABLET ORAL at 20:49

## 2018-12-06 RX ADMIN — LORAZEPAM 1 MG: 1 TABLET ORAL at 08:03

## 2018-12-06 RX ADMIN — ARIPIPRAZOLE 10 MG: 5 TABLET ORAL at 08:03

## 2018-12-06 RX ADMIN — OLANZAPINE 10 MG: 10 TABLET, FILM COATED ORAL at 04:20

## 2018-12-06 RX ADMIN — DOCUSATE SODIUM 250 MG: 250 CAPSULE, LIQUID FILLED ORAL at 08:03

## 2018-12-06 RX ADMIN — OLANZAPINE 20 MG: 10 TABLET, FILM COATED ORAL at 20:49

## 2018-12-06 RX ADMIN — ACETAMINOPHEN 650 MG: 325 TABLET, FILM COATED ORAL at 12:46

## 2018-12-06 RX ADMIN — LORAZEPAM 1 MG: 1 TABLET ORAL at 13:44

## 2018-12-06 RX ADMIN — ACETAMINOPHEN 650 MG: 325 TABLET, FILM COATED ORAL at 19:04

## 2018-12-06 ASSESSMENT — ACTIVITIES OF DAILY LIVING (ADL)
GROOMING: PROMPTS
DRESS: INDEPENDENT
LAUNDRY: WITH SUPERVISION
GROOMING: PROMPTS
ORAL_HYGIENE: PROMPTS
ORAL_HYGIENE: PROMPTS

## 2018-12-06 NOTE — PROGRESS NOTES
"    ----------------------------------------------------------------------------------------------------------  [facility], [place]   Psychiatric Progress Note  Hospital Day #15     Interim History:   The patient's care was discussed with the treatment team and chart notes were reviewed.    Sleep: 2.75 hrs  Scheduled Medications: Received.  PRN Medications: Olanzapine x1 (also declined olanzapine x1), acetaminophen x2.  Staff Report: Yesterday, Dannie had a family visit which went well. She was noted to be highly active - sometimes running through the halls. She also demonstrated a poor sense of personal boundaries by following staff around and peering into other patients' rooms during rounds.  Staff feel that her symptoms tend to worsen at night. She was able to fall asleep overnight without difficulty but again awoke agitated within a few hours. Was emotionally labile upon wakening, alternating between giggly, sobbing and agitated. She was noted to be particularly defiant when requesting a snack of rice overnight and was speaking loudly, in a pressured voice, and difficult to redirect. She was twice offered olanzapine and accepted it the second time. She calmed down with olanzapine but did not return to sleep. Had a visit with family yesterday which went well.    Patient Interview: Ms. Davis was interviewed in the conference room. She is feeling \"good\" today. She went to court this morning for her final commitment hearing and felt it went \"good.\" She was holding court paperwork during the interview and and repeatedly asked what it means after the question was answered. She feels that ?patients/?staff are \"confusing\" her. She is having trouble staying asleep at night and is unsure why. She seems to fall asleep at bedtime without difficulty. She had a visit with her father yesterday which she initially forgot but recalled with prompting, and felt it went well. He brought a bag that her " "mother brought from [place] which sounds like is in her locker here and that she would like to see. She has a headache that started today. No further neck pain or other myalgias. No constipation. No pain with defecation or urination. No urinary incontinence.     The risks, benefits, alternatives and side effects of any medication changes have been discussed and are understood by the patient and other caregivers.    Review of Systems:   See HPI for pertinent ROS.          Allergies:     Allergies   Allergen Reactions     Septra [Sulfamethoxazole W/Trimethoprim]             Psychiatric Examination:   /90 (BP Location: Right arm)  Pulse 116  Temp 98.1  F (36.7  C) (Oral)  Resp 16  Ht 1.702 m (5' 7\")  Wt 61 kg (134 lb 8 oz)  SpO2 100%  BMI 21.07 kg/m2  Weight is 134 lbs 8 oz  Body mass index is 21.07 kg/(m^2).    Appearance: Awake, alert. Wearing hospital scrubs. Somewhat disheveled. Malodorous.  Attitude: Calm. Polite and agreeable.  Eye Contact: Staring.  Mood: \"Good.\"   Affect: Constricted with modest lability.  Speech: Modestly delayed speech latency. Normal rate and volume.   Psychomotor Behavior: Occasional stereotypic chin rubbing. Some echopraxia of head nodding. No evidence of tardive dyskinesia, dystonia or tics.   Thought Process: Confused but generally linear thought process.  Associations: No loose associations.  Thought Content: Difficult to assess today - no reported delusions but this has been her recent baseline.  Insight: Fair.  Judgment: Poor.  Attention Span and Concentration: Limited.  Recent and Remote Memory: Limited - able to describe some recent events with prompting.  Language: Speaking fluent English. Words are occasionally difficult to understand perhaps d/t heavy accent.  Fund of Knowledge: Difficult to assess.  Muscle Strength and Tone: Appearance is grossly normal.  Gait and Station: Slow gait. Normal station.     Labs:   No results found for this or any previous visit (from " the past 24 hour(s)).     Assessment    Diagnostic Impression: Ms. Davis is a 23 y.o. F with a psychiatric history of MDD with psychotic features who presented to the [facility] ED via EMS on 11/21/2018 due to psychosis in the setting of medication non-adherence. Her primary presentation is psychosis, as evidenced by reported and witnessed visual and auditory hallucinations including command hallucinations taking the form of her father, delusions that her father is trying to poison her, stereotypic hand movements, writhing leg movements, echopraxia, and delayed speech latency. Demonstrates emotional lability from calm baseline, with rapid transition to and from sobbing or dancing/singing/laughing. Sleep maintenance is poor and fluctuating. Given her prior diagnosis of MDD with psychosis and her current presentation with acute psychosis in the absence of depressive sxs, a thought disorder seems to be the most likely pathology. Ddx includes schizoaffective disorder and less likely bipolar depression or MDD with psychotic features. Current symptoms, along with marked improvement with lorazepam suggest component of catatonia to her current unspecified condition.     Hospital Course: Ms. Davis was admitted to [facility] on a 72 hour hold. Treatment team filed for commitment and Gu[place]. First commitment hearing completed 12/3/18, second completed today and final recommendation has not yet been received. Olanzapine was started and uptitrated. PRN hydroxyzine and trazodone were initiated. Concern for catatonia with poor olanzapine response. She had marked clinical improvement with lorazepam. Scheduled lorazepam started. Not in full remission with olanzapine so attempted transition to aripiprazole which she has previously taken with good reported response, but she had recurrence of delusions, increased lability, new hyperactivity/agitation, and worsening sleep maintenance, so she is transitioning back to olanzapine.     Medical  Course: CMP, lipid panel, TSH, B12/folate, CBC, sed rate, lyme, treponema, HIV, EMERALD, ceruloplasmin, ECG WNL. Vitamin D low. UA non-infectious. Demonstrated orthostasis, attributable to up-titration of olanzapine.    Plan   Principal Diagnosis:   # Unspecified Schizophrenia Spectrum and Other Psychotic Disorder  # R/O Catatonia    Psychotropic Medications:  Modify:  - Increase olanzapine to 20 mg at bedtime  - Discontinue aripiprazole tomorrow morning    Continue:  - Lorazepam 2 mg at bedtime, 1 mg BID  - Cogentin 1mg BID PRN for akathisia/dystonia  - Olanzapine 10 mg Q2H PRN  - Hydroxyzine 25 mg Q4H PRN  - Trazodone 50 mg at bedtime PRN    Patient will be treated in therapeutic milieu with appropriate individual and group therapies as described.    Medical diagnoses to be addressed this admission:    # Vitamin D Deficiency  - Continue ergocalciferol 50,000 Units Q7 days    # GI/FEN  - Docusate 250 mg daily  - Mylanta/Maalox QID PRN    # Musculoskeletal Pain  - Acetaminophen PRN  - Menthol Topical Analgesic Q6H PRN    Data: As above.  Consults: None.    Legal Status: Court hold.    Safety Assessment:   Behavioral Orders   Procedures     Code 1 - Restrict to Unit     Elopement precautions     Routine Programming     As clinically indicated     Single Room     Status 15     Every 15 minutes.       Disposition: TBD pending clinical stabilization.    Kelli Aguilar, MS3    I have reviewed and edited the documentation recorded by the medical student. This documentation accurately reflects the services I personally performed and treatment decisions made by me in consultation with the attending physician, Mauro Vann MD.    Maximiliano Bates MD  PGY-1 Resident  Pager: [phone]      Attestation:  This patient has been seen and evaluated by me, Mauro Vann.  I have discussed this patient with the house staff team including the resident and medical student and I agree with the findings and plan in this note.    I have  reviewed today's vital signs, medications, labs and imaging. Mauro Vann

## 2018-12-06 NOTE — PROGRESS NOTES
Pt had active shift. In freedman running at one point. Singing to other patients. Continues to lack personal boundaries and follows staff up and down halls, will look into other pt rooms if staff is looking into rooms during rounds. Pt was blocking a certain staff member in the hallway from walking past them a few times during shift, appeared to acting playfully like she was playing soccer or basketball defense, pt listened to redirection. Pt singing biblical songs and telling staff that she loves everybody. Pt seemed paranoid at times and asked to see staffs names on their badges to compare with staff pictures posted on unit. Pt had visit from family which went well.        12/05/18 2100   Behavioral Health   Hallucinations appears responding   Thinking delusional;paranoid;poor concentration   Insight poor   Judgement impaired   Eye Contact at examiner   Affect incongruent   Mood labile   Physical Appearance/Attire attire appropriate to age and situation   Hygiene well groomed   Suicidality other (see comments)  (denies )   1. Wish to be Dead No   2. Non-Specific Active Suicidal Thoughts  No   Self Injury other (see comment)  (denies )   Elopement (no indicators this shift)   Activity restless   Speech clear;coherent   Medication Sensitivity no stated side effects;no observed side effects   Psychomotor / Gait balanced;steady   Psycho Education   Type of Intervention 1:1 intervention   Response participates, initiates socially appropriate   Hours 0.5   Treatment Detail check in    Activities of Daily Living   Hygiene/Grooming independent   Oral Hygiene independent   Dress independent   Room Organization independent   Activity   Activity Assistance Provided independent

## 2018-12-06 NOTE — PROGRESS NOTES
"   12/06/18 7650   Behavioral Health   Hallucinations denies / not responding to hallucinations   Thinking confused;poor concentration   Orientation other (see comment)  (alina)   Memory other (see comment)  (alina)   Insight denial of illness   Judgement impaired   Eye Contact staring   Affect tense;sad   Mood mood is calm;depressed   Physical Appearance/Attire attire appropriate to age and situation   Hygiene well groomed   Suicidality other (see comments)  (denies)   1. Wish to be Dead (alina)   2. Non-Specific Active Suicidal Thoughts  (alina)   Self Injury other (see comment)  (alina)   Elopement (none stated, none observed)   Activity other (see comment)  (visible in milieu)   Speech circumstantial;tangential   Medication Sensitivity no stated side effects;no observed side effects   Psychomotor / Gait steady;balanced   Activities of Daily Living   Hygiene/Grooming prompts   Oral Hygiene prompts   Dress prompts;scrubs (behavioral health);with supervision   Room Organization prompts   Pt reported, \"My head hurts. Its confused.\" Pt needed multiple prompts to get dressed for court and to eat breakfast. Pt went to court and was searched upon return. Pt has been visible in the milieu most of the shift.   "

## 2018-12-06 NOTE — PROGRESS NOTES
"Observed sleeping w/o any observable distress as shift commenced at 2315.  Respirations regular and unlabored.  Wakeful just before 0215 w/ rapid  mood changes, from being happy and giggly to being oppositional and agitated w/ intermittent defiance and unable/unwilling  to follow staff directives.  C/o being \"hungry\";  When questioned as to what she would like, pt. responded \"rice\".  Attempted to explain to pt. that this item was not available in the middle of the night and suggested the pt. write this in on her morning menu.  Became quite agitated w/ staff, with  loud w/ pressured speech and then stomped away from nurses station. Would then begin singing loudly w/ some dancing  requiring much encouragement to lower her voice and be respectful of peers trying to sleep.  Loud non-provoked impulsive sobbing  intermittently but unable to say why she was crying - would  front of the mirror in her bathroom and sob louder and louder. .  Started yelling at one point \"I love my Mommy\" repeatedly, then \"I love my dad, I love my Grandma\".  Would quiet w/ much supportive intervention and stare at staff for long periods in a non-threatening manner.  Endorsed I want to go to school and be a nurse like you ;  I want to be a pediatric nurse\".  Later Luisa asked for Milk and crackers which she had.  Shuffling about the unit continually and w/o purpose  carrying personal items in her arms.  Willing to take Zypreaxa at 0420 as had refused earlier.  Assisted w/measures conducive to sleep x's 2.  Unable or unwilling to stay in bed.  Increased manageability following administrating of Zyprexa but remained wakeful..  Grossly disorganized w/ impaired thought processes.  Sleep continues to be quite poor  w/ only 2.75  Hrs sleep during the first part of the shift.  Sx's appear to be worsening during the noc's.   Safe, therapeutic environment provided/maintained.   "

## 2018-12-06 NOTE — PLAN OF CARE
Problem: Patient Care Overview  Goal: Team Discussion  Team Plan:   BEHAVIORAL TEAM DISCUSSION    Participants: Dr. Mauro Bates PGY-1  Lucy Bowens [facility]  Progress: Little change in displayed psychotic symptoms.   Continued Stay Criteria/Rationale: Continues to display agitation, labile mood and disorganized thinking with inability to sleep.   Medical/Physical: see psychiatry physician progress note for further details  Precautions:   Behavioral Orders   Procedures     Code 1 - Restrict to Unit     Elopement precautions     Routine Programming     As clinically indicated     Single Room     Status 15     Every 15 minutes.     Plan: Zyprexa will be restarted as her symptoms have began to worsen since discontinuing this medication.  Rationale for change in precautions or plan: Abilify will be discontinued as her symptoms have worsened since restarting Abilify and discontinuing the Zyprexa.

## 2018-12-07 PROCEDURE — 25000132 ZZH RX MED GY IP 250 OP 250 PS 637: Performed by: STUDENT IN AN ORGANIZED HEALTH CARE EDUCATION/TRAINING PROGRAM

## 2018-12-07 PROCEDURE — G0177 OPPS/PHP; TRAIN & EDUC SERV: HCPCS

## 2018-12-07 PROCEDURE — 25000132 ZZH RX MED GY IP 250 OP 250 PS 637

## 2018-12-07 PROCEDURE — 99232 SBSQ HOSP IP/OBS MODERATE 35: CPT | Mod: GC | Performed by: PSYCHIATRY & NEUROLOGY

## 2018-12-07 PROCEDURE — 12400007 ZZH R&B MH INTERMEDIATE UMMC

## 2018-12-07 RX ORDER — OLANZAPINE 15 MG/1
30 TABLET ORAL AT BEDTIME
Status: DISCONTINUED | OUTPATIENT
Start: 2018-12-07 | End: 2018-12-17

## 2018-12-07 RX ADMIN — HYDROXYZINE HYDROCHLORIDE 25 MG: 25 TABLET, FILM COATED ORAL at 03:19

## 2018-12-07 RX ADMIN — OLANZAPINE 30 MG: 15 TABLET, FILM COATED ORAL at 20:43

## 2018-12-07 RX ADMIN — DOCUSATE SODIUM 250 MG: 250 CAPSULE, LIQUID FILLED ORAL at 10:02

## 2018-12-07 RX ADMIN — TRAZODONE HYDROCHLORIDE 50 MG: 50 TABLET ORAL at 03:19

## 2018-12-07 RX ADMIN — LORAZEPAM 2 MG: 2 TABLET ORAL at 20:43

## 2018-12-07 RX ADMIN — ACETAMINOPHEN 650 MG: 325 TABLET, FILM COATED ORAL at 17:17

## 2018-12-07 RX ADMIN — ACETAMINOPHEN 650 MG: 325 TABLET, FILM COATED ORAL at 03:19

## 2018-12-07 RX ADMIN — LORAZEPAM 1 MG: 1 TABLET ORAL at 13:51

## 2018-12-07 RX ADMIN — LORAZEPAM 1 MG: 1 TABLET ORAL at 10:02

## 2018-12-07 ASSESSMENT — ACTIVITIES OF DAILY LIVING (ADL)
DRESS: INDEPENDENT
GROOMING: INDEPENDENT
ORAL_HYGIENE: INDEPENDENT

## 2018-12-07 NOTE — PROGRESS NOTES
12/06/18 2201   Behavioral Health   Hallucinations denies / not responding to hallucinations   Thinking poor concentration;confused   Eye Contact staring   Affect blunted, flat;tense   Mood mood is calm   Physical Appearance/Attire attire appropriate to age and situation   Hygiene well groomed   Suicidality other (see comments)  (SALAS)   Self Injury other (see comment)  (SALAS)   Elopement (None observed)   Activity other (see comment)  (Visible in the milieu)   Speech pressured;tangential   Medication Sensitivity no observed side effects   Psychomotor / Gait balanced;steady   Psycho Education   Type of Intervention 1:1 intervention   Response participates with encouragement   Hours 0.5   Treatment Detail Check-In   Activities of Daily Living   Hygiene/Grooming prompts   Oral Hygiene prompts   Dress independent   Laundry with supervision   Room Organization independent     Pt invisible in the milieu for the majority of the shift. While in the milieu, pt paced the hallway, ate dinner, and watched movies with peers. Moreover, pt napped for an hr around 2000. Throughout the shift, pt had the tendency to stare and follow staff around.

## 2018-12-07 NOTE — PROGRESS NOTES
Behavioral Health  Note   Behavioral Health  Spirituality Group Note     Unit 22    Name: Dannie Davis    YOB: 1995   MRN: [id number]    Age: 23 year old     Patient attended -led group, which included discussion of spirituality, coping with illness and building resilience.   Patient attended group for 1.0 hrs.   patient minimally participated, but was respectful to the group process.     Souravchristopher [facility]  Staff    Page [phone]

## 2018-12-07 NOTE — PLAN OF CARE
"Problem: Cognitive Impairment (Psychotic Signs/Symptoms) (Adult)  Goal: Improved Thought Clarity/Organization (Psychotic Signs/Symptoms)  Outcome: Improving  Dannie continues disorganized thinking and behaviors-often standing and staring at others while during interactions-requires redirection to refrain from following others, hannah autistic male peer she is \"praying for\"-carries book and papers with her at all times-appears preoccupied, at times sudden angry accusations with no precipitating event-parents visited this afternoon-state daughter has been approved for immigration to [place] in 4 mths and will be arriving in April-mom reassuring Dannie daughter doing very well-       "

## 2018-12-07 NOTE — PROGRESS NOTES
"    ----------------------------------------------------------------------------------------------------------  [facility], [place]   Psychiatric Progress Note  Hospital Day #16     Interim History:   The patient's care was discussed with the treatment team and chart notes were reviewed.    Sleep: 5.75 hrs  Scheduled Medications: Received.  PRN Medications: Hydroxyzine x1, trazodone x1, acetaminophen x3 (for HA).  Staff Report: Dannie spent her time in the milieu yesterday having meals, watching movies, pacing the halls and sometimes following staff around. She was noted to have a 1 hr nap in the evening. She felt confused yesterday morning and c/o HA periodically throughout the day. No acute events overnight.    Patient Interview: Ms. Davis was interviewed in the station 22 conference room. She is feeling \"good\" today. She had a visit with her father and aunt yesterday which went well. She continues to feel that her family, except for her mother, is evil. She cares for and prays for her family, stating she is \"covered in the blood of Jose Maria.\" She does not feel like her father is controlling her today. She asked the attending \"could you be my father?\" and asked the medical student if she could have a hug. She has been sleeping better and her headaches have gone away.    The risks, benefits, alternatives and side effects of any medication changes have been discussed and are understood by the patient and other caregivers.    Review of Systems:   See HPI for pertinent ROS.          Allergies:     Allergies   Allergen Reactions     Septra [Sulfamethoxazole W/Trimethoprim]             Psychiatric Examination:   /74  Pulse 118  Temp 98.8  F (37.1  C) (Tympanic)  Resp 16  Ht 1.702 m (5' 7\")  Wt 61 kg (134 lb 8 oz)  SpO2 100%  BMI 21.07 kg/m2  Weight is 134 lbs 8 oz  Body mass index is 21.07 kg/(m^2).    Appearance: Awake, alert. Wearing hospital scrubs. Somewhat " "disheveled.  Attitude: Polite.  Eye Contact: Staring.  Mood: \"Good.\"   Affect: Labile - has episodes of sobbing and furrowed brow punctuating a neutral baseline.  Speech: Delayed speech latency. Normal rate and volume.   Psychomotor Behavior: Infrequent stereotypic chin rubbing. No evidence of tardive dyskinesia, dystonia or tics.   Thought Process: Disorganized.  Associations: No loose associations.  Thought Content: Having delusions that her family is evil.  Insight: Poor.  Judgment: Poor.  Attention Span and Concentration: Limited.  Recent and Remote Memory: Limited - able to describe some recent events with prompting.  Language: Speaking fluent English.  Fund of Knowledge: Difficult to assess.  Muscle Strength and Tone: Appearance is grossly normal.  Gait and Station: Slow gait. Normal station.     Labs:   No results found for this or any previous visit (from the past 24 hour(s)).     Assessment    Diagnostic Impression: Ms. Davis is a 23 y.o. F with a psychiatric history of MDD with psychotic features who presented to the [facility] ED via EMS on 11/21/2018 due to psychosis in the setting of medication non-adherence. Her primary presentation is psychosis, as evidenced by reported and witnessed visual and auditory hallucinations including command hallucinations taking the form of her father, delusions that her father is trying to poison her, stereotypic hand movements, writhing leg movements, echopraxia, and delayed speech latency. Demonstrates emotional lability from calm baseline, with rapid transition to and from sobbing or dancing/singing/laughing. Sleep maintenance is poor and fluctuating. Given her prior diagnosis of MDD with psychosis and her current presentation with acute psychosis in the absence of depressive sxs, a thought disorder seems to be the most likely pathology. Ddx includes schizoaffective disorder and less likely bipolar depression or MDD with psychotic features. Current symptoms, along with " marked improvement with lorazepam suggest component of catatonia to her current unspecified condition.     Hospital Course: Ms. Davis was admitted to [facility] on a 72 hour hold. Treatment team filed for commitment and [place]. First commitment hearing completed 12/3/18, second completed 12/6/18, and final recommendation has not yet been received. Olanzapine was started and uptitrated. PRN hydroxyzine and trazodone were initiated. Concern for catatonia with poor olanzapine response. She had marked clinical improvement with lorazepam. Scheduled lorazepam started. Not in full remission with olanzapine so attempted transition to aripiprazole which she has previously taken with good reported response, but she had recurrence of delusions, increased lability, new hyperactivity/agitation, and worsening sleep maintenance, so she is transitioning back to olanzapine.     Medical Course: CMP, lipid panel, TSH, B12/folate, CBC, sed rate, lyme, treponema, HIV, EMERALD, ceruloplasmin, ECG WNL. Vitamin D low. UA non-infectious. Demonstrated orthostasis, attributable to up-titration of olanzapine.    Plan   Principal Diagnosis:   # Unspecified Schizophrenia Spectrum and Other Psychotic Disorder  # R/O Catatonia    Psychotropic Medications:  Modify:  - Increase olanzapine to 30 mg at bedtime    Continue:  - Lorazepam 2 mg at bedtime, 1 mg BID  - Cogentin 1mg BID PRN for akathisia/dystonia  - Olanzapine 10 mg Q2H PRN  - Hydroxyzine 25 mg Q4H PRN  - Trazodone 50 mg at bedtime PRN    Patient will be treated in therapeutic milieu with appropriate individual and group therapies as described.    Medical diagnoses to be addressed this admission:    # Vitamin D Deficiency  - Continue ergocalciferol 50,000 Units Q7 days    # GI/FEN  - Docusate 250 mg daily  - Mylanta/Maalox QID PRN    # Musculoskeletal Pain  - Acetaminophen PRN  - Menthol Topical Analgesic Q6H PRN    Data: As above.  Consults: None.    Legal Status: Court hold.    Safety  Assessment:   Behavioral Orders   Procedures     Code 1 - Restrict to Unit     Elopement precautions     Routine Programming     As clinically indicated     Single Room     Status 15     Every 15 minutes.       Disposition: TBD pending clinical stabilization.    Kelli Aguilar, MS3    I have reviewed and edited the documentation recorded by the medical student. This documentation accurately reflects the services I personally performed and treatment decisions made by me in consultation with the attending physician, Mauro Vann MD.    Maximiliano Bates MD  PGY-1 Resident  Pager: [phone]    Attestation:  This patient has been seen and evaluated by me, Mauro Vann.  I have discussed this patient with the house staff team including the resident and medical student and I agree with the findings and plan in this note.    I have reviewed today's vital signs, medications, labs and imaging. Mauro Vann MD

## 2018-12-08 PROCEDURE — 25000132 ZZH RX MED GY IP 250 OP 250 PS 637

## 2018-12-08 PROCEDURE — 12400007 ZZH R&B MH INTERMEDIATE UMMC

## 2018-12-08 PROCEDURE — 25000132 ZZH RX MED GY IP 250 OP 250 PS 637: Performed by: STUDENT IN AN ORGANIZED HEALTH CARE EDUCATION/TRAINING PROGRAM

## 2018-12-08 PROCEDURE — G0177 OPPS/PHP; TRAIN & EDUC SERV: HCPCS

## 2018-12-08 RX ADMIN — ACETAMINOPHEN 650 MG: 325 TABLET, FILM COATED ORAL at 03:14

## 2018-12-08 RX ADMIN — OLANZAPINE 10 MG: 10 TABLET, FILM COATED ORAL at 08:04

## 2018-12-08 RX ADMIN — OLANZAPINE 10 MG: 10 TABLET, FILM COATED ORAL at 03:14

## 2018-12-08 RX ADMIN — ACETAMINOPHEN 650 MG: 325 TABLET, FILM COATED ORAL at 10:52

## 2018-12-08 RX ADMIN — LORAZEPAM 2 MG: 2 TABLET ORAL at 20:07

## 2018-12-08 RX ADMIN — LORAZEPAM 1 MG: 1 TABLET ORAL at 13:45

## 2018-12-08 RX ADMIN — ACETAMINOPHEN 650 MG: 325 TABLET, FILM COATED ORAL at 19:37

## 2018-12-08 RX ADMIN — LORAZEPAM 1 MG: 1 TABLET ORAL at 08:04

## 2018-12-08 RX ADMIN — DOCUSATE SODIUM 250 MG: 250 CAPSULE, LIQUID FILLED ORAL at 08:04

## 2018-12-08 RX ADMIN — TRAZODONE HYDROCHLORIDE 50 MG: 50 TABLET ORAL at 02:38

## 2018-12-08 RX ADMIN — OLANZAPINE 30 MG: 15 TABLET, FILM COATED ORAL at 20:07

## 2018-12-08 ASSESSMENT — ACTIVITIES OF DAILY LIVING (ADL)
ORAL_HYGIENE: INDEPENDENT
DRESS: INDEPENDENT
GROOMING: INDEPENDENT
GROOMING: INDEPENDENT
ORAL_HYGIENE: INDEPENDENT
DRESS: INDEPENDENT

## 2018-12-08 NOTE — PROGRESS NOTES
"Pt was up wandering on the unit for a bit, needing redirection to stay in lounge area, or try to rest in her room. Trazodone given; did not seem to be effective. She requires firm direction to take meds, and to rest; very soft approach minimally effective.Gave pt Zyprexa, also tylenol for c/o headache. Pt appears(and per petros report), to be somewhat sad and struggling; almost teary during interaction, and crying in her room. Reassurance given. She thanked this nurse repeatedly; also askeds for \"a hug.\" Will inquire (per report) that days possibly check with pt's provider re ? benefit of an antidepressant; hannah given pt's dx-to help brighten affect. Pt is asleep in her room, presently. Will continue to monitor.    0615) Pt is up and in the lounge; crying some and needing redirection, re not pacing in freedman. Reassurance given. She slept 5.5 hours last night.  "

## 2018-12-08 NOTE — PROGRESS NOTES
12/07/18 2200   Behavioral Health   Hallucinations appears responding   Thinking distractable;poor concentration;paranoid   Orientation person: oriented   Memory confabulation   Insight denial of illness   Judgement impaired   Eye Contact at examiner   Affect blunted, flat;irritable;tense   Mood anxious;labile   Physical Appearance/Attire disheveled   Hygiene neglected grooming - unclean body, hair, teeth   Suicidality other (see comments)  (denies)   1. Wish to be Dead No   2. Non-Specific Active Suicidal Thoughts  No   Self Injury other (see comment)  (denies)   Elopement (no value)   Activity restless   Speech clear;coherent   Medication Sensitivity no observed side effects;no stated side effects   Psychomotor / Gait balanced;steady   Activities of Daily Living   Hygiene/Grooming independent   Oral Hygiene independent   Dress independent   Room Organization independent       Patient is restless, paranoid and not cooperative. Pt was heard stating that she is not the devil. But also pt stated she hears good voices not bad voices. Pt also asked what is going on?. Pt is confused and disorientated. No SI or SIB.

## 2018-12-08 NOTE — PROGRESS NOTES
Pt woke up very early.  Pt was sitting in the hallway at the start of the shift and shortly after the shift started pt was pacing the freedman screaming and crying about wanting to know what happened to her, wanting to leave and reading her bible and chanting. Pt was redirected for at least a good 30 minutes to lower her voice due to other patients sleeping, or just staying in her as she processed her emotions with staff. Pt eventually calmed down and agreed to take her medication.      12/08/18 1430   Behavioral Health   Hallucinations auditory;appears responding   Thinking confused;distractable;delusional;paranoid;poor concentration   Orientation place, disoriented;situation, disoriented   Memory confabulation   Insight poor   Judgement impaired   Eye Contact staring;cultural specific   Affect blunted, flat;sad;tense;irritable   Mood hopeless;anxious;irritable   Physical Appearance/Attire appears stated age;attire appropriate to age and situation;neat   Hygiene well groomed   1. Wish to be Dead No   2. Non-Specific Active Suicidal Thoughts  No   Activities of Daily Living   Hygiene/Grooming independent   Oral Hygiene independent   Dress independent   Room Organization independent

## 2018-12-09 PROCEDURE — 25000132 ZZH RX MED GY IP 250 OP 250 PS 637: Performed by: STUDENT IN AN ORGANIZED HEALTH CARE EDUCATION/TRAINING PROGRAM

## 2018-12-09 PROCEDURE — 12400007 ZZH R&B MH INTERMEDIATE UMMC

## 2018-12-09 RX ORDER — DIPHENHYDRAMINE HYDROCHLORIDE 50 MG/ML
50 INJECTION INTRAMUSCULAR; INTRAVENOUS EVERY 4 HOURS PRN
Status: DISCONTINUED | OUTPATIENT
Start: 2018-12-09 | End: 2019-01-15

## 2018-12-09 RX ORDER — HALOPERIDOL 5 MG/1
5 TABLET ORAL EVERY 4 HOURS PRN
Status: DISCONTINUED | OUTPATIENT
Start: 2018-12-09 | End: 2018-12-27

## 2018-12-09 RX ORDER — HYDROXYZINE HYDROCHLORIDE 25 MG/1
25-50 TABLET, FILM COATED ORAL EVERY 4 HOURS PRN
Status: DISCONTINUED | OUTPATIENT
Start: 2018-12-09 | End: 2019-01-18 | Stop reason: HOSPADM

## 2018-12-09 RX ORDER — DIPHENHYDRAMINE HCL 25 MG
50 CAPSULE ORAL EVERY 4 HOURS PRN
Status: DISCONTINUED | OUTPATIENT
Start: 2018-12-09 | End: 2018-12-27

## 2018-12-09 RX ORDER — HALOPERIDOL 5 MG/ML
5 INJECTION INTRAMUSCULAR EVERY 4 HOURS PRN
Status: DISCONTINUED | OUTPATIENT
Start: 2018-12-09 | End: 2019-01-15

## 2018-12-09 RX ADMIN — LORAZEPAM 2 MG: 2 TABLET ORAL at 20:30

## 2018-12-09 RX ADMIN — LORAZEPAM 1 MG: 1 TABLET ORAL at 08:37

## 2018-12-09 RX ADMIN — DIPHENHYDRAMINE HYDROCHLORIDE 50 MG: 25 CAPSULE ORAL at 16:31

## 2018-12-09 RX ADMIN — OLANZAPINE 30 MG: 15 TABLET, FILM COATED ORAL at 20:31

## 2018-12-09 RX ADMIN — DOCUSATE SODIUM 250 MG: 250 CAPSULE, LIQUID FILLED ORAL at 08:37

## 2018-12-09 RX ADMIN — OLANZAPINE 10 MG: 10 TABLET, FILM COATED ORAL at 03:42

## 2018-12-09 RX ADMIN — ACETAMINOPHEN 650 MG: 325 TABLET, FILM COATED ORAL at 20:33

## 2018-12-09 RX ADMIN — LORAZEPAM 1 MG: 1 TABLET ORAL at 13:31

## 2018-12-09 RX ADMIN — HALOPERIDOL 5 MG: 5 TABLET ORAL at 16:32

## 2018-12-09 ASSESSMENT — ACTIVITIES OF DAILY LIVING (ADL)
DRESS: INDEPENDENT
ORAL_HYGIENE: INDEPENDENT
DRESS: INDEPENDENT
LAUNDRY: WITH SUPERVISION
ORAL_HYGIENE: INDEPENDENT
HYGIENE/GROOMING: INDEPENDENT
LAUNDRY: WITH SUPERVISION
HYGIENE/GROOMING: INDEPENDENT

## 2018-12-09 ASSESSMENT — MIFFLIN-ST. JEOR: SCORE: 1381.38

## 2018-12-09 NOTE — PROGRESS NOTES
Pt restless during shift, following staff members around unit, unsure of what to do. Pt is disoriented and confused. Tried following writer behind desk on two occasions. Pt's mood is labile at times is crying and fearful, at other times is singing and running in freedman. Complaining of head and neck pain throughout the shift. Pt stating they want to see their baby. Lacks space boundaries with peers and staff and will stand close to and stare at patients or staff without saying anything. Pt has a difficult time staying with one activity and appears anxious.      12/08/18 2200   Behavioral Health   Hallucinations auditory;appears responding   Thinking confused;distractable;delusional   Orientation person, disoriented;place, disoriented;situation, disoriented   Memory new learning, recall loss   Insight poor   Judgement impaired   Eye Contact at examiner   Affect sad;tense   Mood anxious;labile   Physical Appearance/Attire attire appropriate to age and situation   Hygiene well groomed   Suicidality other (see comments)  (denies )   1. Wish to be Dead No   2. Non-Specific Active Suicidal Thoughts  No   Self Injury other (see comment)  (denies )   Elopement Statements about wanting to leave  (confusion )   Activity restless   Speech coherent   Medication Sensitivity no observed side effects   Psychomotor / Gait balanced;steady   Psycho Education   Type of Intervention 1:1 intervention   Response participates with encouragement   Hours 0.5   Treatment Detail check in    Activities of Daily Living   Hygiene/Grooming independent   Oral Hygiene independent   Dress independent   Room Organization independent   Activity   Activity Assistance Provided independent

## 2018-12-09 NOTE — PROGRESS NOTES
Pt slept 5.75 hours last night. She was up and down, but later on in the shift. No crying, and somewhat more easily redirectable, vs last night. Headphones given per request, also reassurance.

## 2018-12-09 NOTE — PLAN OF CARE
Dannie spent most of day walking about unit holding marble tablet and pencil tightly-c/o sore shoulders, but refused to put tablet down to ice or take Tylenol for discomfort-several emotional and angry out bursts, accusing several staff of being evil demons-appeared anxious and frightened-is able to be reassured, but needs freq supportive interactions to remind her she is not thinking clearly and is hospitalized-at times singing loudly with head phones on-requires redirection re intrusiveness with peers and visitors-does at times seem to have some insight re illness and thanks staff for helping-anxious when receiving medications requiring several minute explanation and reassurance with each medication administration-father phoned stating family will be coming to visit with food this afternoon-father will encourage fluids as Dannie reluctant to drink water-

## 2018-12-10 LAB
BASOPHILS # BLD AUTO: 0 10E9/L (ref 0–0.2)
BASOPHILS NFR BLD AUTO: 0.2 %
DIFFERENTIAL METHOD BLD: NORMAL
EOSINOPHIL # BLD AUTO: 0.1 10E9/L (ref 0–0.7)
EOSINOPHIL NFR BLD AUTO: 1.3 %
ERYTHROCYTE [DISTWIDTH] IN BLOOD BY AUTOMATED COUNT: 13.4 % (ref 10–15)
HCT VFR BLD AUTO: 35.8 % (ref 35–47)
HGB BLD-MCNC: 12.1 G/DL (ref 11.7–15.7)
IMM GRANULOCYTES # BLD: 0 10E9/L (ref 0–0.4)
IMM GRANULOCYTES NFR BLD: 0.2 %
LYMPHOCYTES # BLD AUTO: 1.8 10E9/L (ref 0.8–5.3)
LYMPHOCYTES NFR BLD AUTO: 32.9 %
MCH RBC QN AUTO: 27.1 PG (ref 26.5–33)
MCHC RBC AUTO-ENTMCNC: 33.8 G/DL (ref 31.5–36.5)
MCV RBC AUTO: 80 FL (ref 78–100)
MONOCYTES # BLD AUTO: 0.4 10E9/L (ref 0–1.3)
MONOCYTES NFR BLD AUTO: 7.4 %
NEUTROPHILS # BLD AUTO: 3.2 10E9/L (ref 1.6–8.3)
NEUTROPHILS NFR BLD AUTO: 58 %
NRBC # BLD AUTO: 0 10*3/UL
NRBC BLD AUTO-RTO: 0 /100
PLATELET # BLD AUTO: 294 10E9/L (ref 150–450)
RBC # BLD AUTO: 4.47 10E12/L (ref 3.8–5.2)
WBC # BLD AUTO: 5.5 10E9/L (ref 4–11)

## 2018-12-10 PROCEDURE — 99232 SBSQ HOSP IP/OBS MODERATE 35: CPT | Mod: GC | Performed by: PSYCHIATRY & NEUROLOGY

## 2018-12-10 PROCEDURE — 25000132 ZZH RX MED GY IP 250 OP 250 PS 637: Performed by: STUDENT IN AN ORGANIZED HEALTH CARE EDUCATION/TRAINING PROGRAM

## 2018-12-10 PROCEDURE — 25000132 ZZH RX MED GY IP 250 OP 250 PS 637: Performed by: PSYCHIATRY & NEUROLOGY

## 2018-12-10 PROCEDURE — 85025 COMPLETE CBC W/AUTO DIFF WBC: CPT | Performed by: STUDENT IN AN ORGANIZED HEALTH CARE EDUCATION/TRAINING PROGRAM

## 2018-12-10 PROCEDURE — G0177 OPPS/PHP; TRAIN & EDUC SERV: HCPCS

## 2018-12-10 PROCEDURE — 25000132 ZZH RX MED GY IP 250 OP 250 PS 637

## 2018-12-10 PROCEDURE — 12400003 ZZH R&B MH CRITICAL UMMC

## 2018-12-10 PROCEDURE — 36415 COLL VENOUS BLD VENIPUNCTURE: CPT | Performed by: STUDENT IN AN ORGANIZED HEALTH CARE EDUCATION/TRAINING PROGRAM

## 2018-12-10 RX ORDER — LITHIUM CARBONATE 300 MG/1
600 TABLET, FILM COATED, EXTENDED RELEASE ORAL EVERY MORNING
Status: DISCONTINUED | OUTPATIENT
Start: 2018-12-11 | End: 2018-12-10

## 2018-12-10 RX ORDER — LITHIUM CARBONATE 600 MG/1
600 CAPSULE ORAL ONCE
Status: DISCONTINUED | OUTPATIENT
Start: 2018-12-10 | End: 2018-12-10

## 2018-12-10 RX ORDER — CLOZAPINE 25 MG/1
12.5 TABLET ORAL AT BEDTIME
Status: DISCONTINUED | OUTPATIENT
Start: 2018-12-10 | End: 2018-12-11

## 2018-12-10 RX ADMIN — TRAZODONE HYDROCHLORIDE 50 MG: 50 TABLET ORAL at 02:44

## 2018-12-10 RX ADMIN — OLANZAPINE 10 MG: 10 TABLET, FILM COATED ORAL at 16:17

## 2018-12-10 RX ADMIN — ACETAMINOPHEN 650 MG: 325 TABLET, FILM COATED ORAL at 12:52

## 2018-12-10 RX ADMIN — Medication 12.5 MG: at 20:27

## 2018-12-10 RX ADMIN — LORAZEPAM 1 MG: 1 TABLET ORAL at 13:50

## 2018-12-10 RX ADMIN — TRAZODONE HYDROCHLORIDE 50 MG: 50 TABLET ORAL at 03:37

## 2018-12-10 RX ADMIN — DOCUSATE SODIUM 250 MG: 250 CAPSULE, LIQUID FILLED ORAL at 08:50

## 2018-12-10 RX ADMIN — OLANZAPINE 30 MG: 15 TABLET, FILM COATED ORAL at 20:25

## 2018-12-10 RX ADMIN — BENZTROPINE MESYLATE 1 MG: 1 TABLET ORAL at 02:44

## 2018-12-10 RX ADMIN — LORAZEPAM 2 MG: 2 TABLET ORAL at 20:25

## 2018-12-10 RX ADMIN — LORAZEPAM 1 MG: 1 TABLET ORAL at 08:50

## 2018-12-10 RX ADMIN — ERGOCALCIFEROL 50000 UNITS: 1.25 CAPSULE ORAL at 16:17

## 2018-12-10 RX ADMIN — ACETAMINOPHEN 650 MG: 325 TABLET, FILM COATED ORAL at 17:27

## 2018-12-10 ASSESSMENT — ACTIVITIES OF DAILY LIVING (ADL)
HYGIENE/GROOMING: INDEPENDENT
ORAL_HYGIENE: INDEPENDENT
HYGIENE/GROOMING: INDEPENDENT;SHOWER
ORAL_HYGIENE: INDEPENDENT
LAUNDRY: UNABLE TO COMPLETE
DRESS: INDEPENDENT
DRESS: INDEPENDENT

## 2018-12-10 NOTE — PROGRESS NOTES
Writer received pt asleep in room, however pt awoke soon after 0215 rounds. Pt came to desk, was unable to verbalize her needs (mumbling quietly, speaking only a few words at a time). Pt encouraged to try sleeping multiple times but she paced halls instead. Appeared afraid when talking to staff on one occasion: started backing away w/ frightened expression, unable to explain why when asked by staff. Kneeled in front of door, began singing and prostrating as if praying. Pt observed shaking strongly, pt nodded when asked if cold.     Pt sought medication around 0240, took meds and went to sleep. Stayed asleep for rest of night  Pt slept for 5.5 hrs tonight

## 2018-12-10 NOTE — PROGRESS NOTES
"    ----------------------------------------------------------------------------------------------------------  [facility], [place]   Psychiatric Progress Note  Hospital Day #19     Interim History:   The patient's care was discussed with the treatment team and chart notes were reviewed.    Sleep: 5.25 hrs  Scheduled Medications: Received.  PRN Medications (last 24 hrs): Acetaminophen x1, benztropine x1, haldol x1, diphenhydramine x1 (d/t maximized olanzapine overnight Sat/Sun), trazodone x2.  Staff Report: Continued to be quite labile over the weekend, with periods of agitation during which she paced, spoke about the devil, sang and prayed, as well as periods of apparent depression and sobbing. Became most upset when a young child visited another patient this weekend, believing he was her daughter. Demonstrating poor personal boundaries by following staff around and trying to heal other patients, both from afar and by placing her hands on their forehead. Requiring vigilant redirection. She appeared to be responding to internal stimuli. Appeared to become afraid when talking to staff. Awoke overnight with c/o overall body aches. Was given cogentin and trazodone with improvement.     Patient Interview: Ms. Davis was interviewed in the [facility] conference room. She did not respond to many questions and stood through most of the interview, staring at staff with a furrowed and aggressive expression, but eventually sat after dragging a chair around for a few minutes. She did not initially respond when asked how she is feeling but later describes feeling \"scared.\" Continues to feel that her family members, including her mom, are evil. Expresses concern that she is also \"evil\" but was able to be reassured. She does not want to go home and does not feel safe here. When asked what we could do to help, she responded \"nothing.\" Would like to have a shower. Denies pain currently.     The risks, " "benefits, alternatives and side effects of any medication changes have been discussed and are understood by the patient and other caregivers.    Review of Systems:   See HPI for pertinent ROS.          Allergies:     Allergies   Allergen Reactions     Septra [Sulfamethoxazole W/Trimethoprim]             Psychiatric Examination:   /81 (BP Location: Left arm)   Pulse 116   Temp 99.1  F (37.3  C) (Tympanic)   Resp 16   Ht 1.702 m (5' 7\")   Wt 59.9 kg (132 lb)   SpO2 100%   BMI 20.67 kg/m    Weight is 132 lbs 0 oz  Body mass index is 20.67 kg/m .    Appearance: Awake, alert. Wearing hospital scrubs. Somewhat disheveled.  Attitude: Defensive.  Eye Contact: Staring.  Mood: \"Scared.\"   Affect: Angry.  Speech: Delayed speech latency. Normal rate and volume.   Psychomotor Behavior: Stereotypic chin rubbing, eye rubbing, and arm twisting. No evidence of tardive dyskinesia, dystonia or tics.   Thought Process: Disorganized.  Associations: No loose associations.  Thought Content: Having delusions that her family and self are evil.  Insight: Poor.  Judgment: Poor.  Attention Span and Concentration: Limited.  Recent and Remote Memory: Limited - able to describe recent events with prompting.  Language: Speaking fluent English with strong accent.  Fund of Knowledge: Difficult to assess.  Muscle Strength and Tone: Appearance is grossly normal.  Gait and Station: Slow gait. Normal station.     Labs:     Recent Results (from the past 24 hour(s))   CBC with platelets differential    Collection Time: 12/10/18 10:55 AM   Result Value Ref Range    WBC 5.5 4.0 - 11.0 10e9/L    RBC Count 4.47 3.8 - 5.2 10e12/L    Hemoglobin 12.1 11.7 - 15.7 g/dL    Hematocrit 35.8 35.0 - 47.0 %    MCV 80 78 - 100 fl    MCH 27.1 26.5 - 33.0 pg    MCHC 33.8 31.5 - 36.5 g/dL    RDW 13.4 10.0 - 15.0 %    Platelet Count 294 150 - 450 10e9/L    Diff Method Automated Method     % Neutrophils 58.0 %    % Lymphocytes 32.9 %    % Monocytes 7.4 %    % " Eosinophils 1.3 %    % Basophils 0.2 %    % Immature Granulocytes 0.2 %    Nucleated RBCs 0 0 /100    Absolute Neutrophil 3.2 1.6 - 8.3 10e9/L    Absolute Lymphocytes 1.8 0.8 - 5.3 10e9/L    Absolute Monocytes 0.4 0.0 - 1.3 10e9/L    Absolute Eosinophils 0.1 0.0 - 0.7 10e9/L    Absolute Basophils 0.0 0.0 - 0.2 10e9/L    Abs Immature Granulocytes 0.0 0 - 0.4 10e9/L    Absolute Nucleated RBC 0.0         Assessment    Diagnostic Impression: Ms. Davis is a 23 y.o. F with a psychiatric history of MDD with psychotic features who presented to the [facility] ED via EMS on 11/21/2018 due to psychosis in the setting of medication non-adherence. Her primary presentation is psychosis, as evidenced by reported and witnessed visual and auditory hallucinations including command hallucinations taking the form of her father, delusions that her father is trying to poison her, stereotypic hand movements, writhing leg movements, echopraxia, and delayed speech latency. Demonstrates emotional lability from calm baseline, with rapid transition to and from sobbing or dancing/singing/laughing. Sleep maintenance is poor and fluctuating. Given her prior diagnosis of MDD with psychosis and her current presentation with acute psychosis in the absence of depressive sxs, a thought disorder seems to be the most likely pathology. Ddx includes schizoaffective disorder and less likely bipolar depression or MDD with psychotic features. Current symptoms, along with initial improvement with lorazepam suggest component of catatonia to her current unspecified condition.     Hospital Course: Ms. Davis was admitted to [facility] on a 72 hour hold. Treatment team filed for commitment and [facility] which have been received. Olanzapine was started and uptitrated. PRN hydroxyzine and trazodone were initiated. Some concern for catatonia given poor olanzapine response and was given lorazepam with marked clinical improvement. Scheduled lorazepam was started. Not in full  remission with olanzapine so attempted transition to aripiprazole which she has previously taken with good reported response, but she had recurrence of delusions, increased lability, new hyperactivity/agitation, and worsening sleep maintenance, so she is transitioning back to olanzapine. After demonstrating poor symptom improvement, clozapine was started.     Medical Course: CMP, lipid panel, TSH, B12/folate, CBC, sed rate, lyme, treponema, HIV, EMERALD, ceruloplasmin, ECG WNL. Vitamin D low. UA non-infectious. Demonstrated orthostasis, attributable to up-titration of olanzapine. Repeat CBC w/ diff WNL.    Plan   Principal Diagnosis:   # Unspecified Schizophrenia Spectrum and Other Psychotic Disorder  # R/O Catatonia    Psychotropic Medications:  Modify:  - Start clozapine 12.5 mg at bedtime     Continue:  - Olanzapine 30 mg at bedtime   - Haldol 5 mg Q4H PRN (to use if daily olanzapine total exceeds 40mg)  - Diphenhydramine 50 mg Q4H PRN  - Lorazepam 2 mg at bedtime, 1 mg BID  - Cogentin 1mg BID PRN for akathisia/dystonia  - Olanzapine 10 mg Q2H PRN  - Hydroxyzine 25 mg Q4H PRN  - Trazodone 50 mg at bedtime PRN    Patient will be treated in therapeutic milieu with appropriate individual and group therapies as described.    Medical diagnoses to be addressed this admission:    # Vitamin D Deficiency  - Continue ergocalciferol 50,000 Units Q7 days    # GI/FEN  - Docusate 250 mg daily  - Mylanta/Maalox QID PRN    # Musculoskeletal Pain  - Acetaminophen PRN  - Menthol Topical Analgesic Q6H PRN    Data: As above.  Consults: None.    Legal Status: Commitment with [facility].    Safety Assessment:   Behavioral Orders   Procedures     Code 1 - Restrict to Unit     Elopement precautions     Routine Programming     As clinically indicated     Single Room     Status 15     Every 15 minutes.     Status Individual Observation     Order Specific Question:   CONTINUOUS 24 hours / day     Answer:   5 feet     Order Specific Question:    Indications for SIO     Answer:   Severe intrusiveness       Disposition: TBD pending clinical stabilization.    Kelli Aguilar, MS3    I have reviewed and edited the documentation recorded by the medical student. This documentation accurately reflects the services I personally performed and treatment decisions made by me in consultation with the attending physician, Mauro Vann MD.    Maximiliano Bates MD  PGY-1 Resident  Pager: [phone]     Attestation:  This patient has been seen and evaluated by me, Mauro Vann.  I have discussed this patient with the house staff team including the resident and medical student and I agree with the findings and plan in this note.    I have reviewed today's vital signs, medications, labs and imaging. Mauro Vann MD

## 2018-12-10 NOTE — PROGRESS NOTES
"Rec'd pt. sleeping w/ regular non-labored respirations and no acute distress as shift commenced.  Slept well till approx 0215 at which time pt. came to nurses stating \"I'm hungry\".  When asked what she'd like, pt. responded a \"subway\".  Receptive to explanation that subway wasn't an option at this time w/ a \"thank you\".  Chose not to have a snack from the kitchen.  Standing for long periods at the nurses station staring at this staff.  Repeated \"I want to go home and I want to  improve my English to the best of my ability and I want to go to nursing school.  Carrying around numerous personal items in her hands/arms.  Assisted w/ measures conducive to sleep but declined to lie down and try to sleep.  Further refused initial offer/encouragment for prn.  Appeared to be responding to internal stimuli but when questioned re: same would simply stand and stare at staff w/o responding.  On one occasion began to sob loud and hard while speaking in an uncomprehensible  manner.  Spent time w/ pt.;  Supportive intervention/encouragement provided. Agreed to taking medication at this time.  Endorsed \"my body aches all over\"  Cogentin and Trazadone  administered  as ordered w/ effectiveness w/i approx 1 1/2 hr.  Continuing to monitor closely and meet pt. needs proactively.     "

## 2018-12-10 NOTE — PROGRESS NOTES
Pt had frequent episodes of sobbing and yelling at staff.  Didn't respond well to redirection.  Pt requested to take a shower, staff got pt supplies but pt wouldn't go to the shower.  Pt then continuously asked multiple times throughout the shift but wouldn't go to the shower.  She eventually took a shower half way through the shift.  Pt's parents visited who brought her lunch.  Pt approached another pt and attempted to pray for pt with her hand over other pt's head.  Pt afterwards continued to be intrusive and was then placed on a SIO.  Pt cleaned her room with the help of staff and was calmer the rest of the shift.  Pt denies SI/SIB and medication side effects.         12/10/18 1500   Behavioral Health   Hallucinations appears responding   Thinking distractable;confused;paranoid;poor concentration   Orientation situation, disoriented   Insight poor   Judgement impaired   Eye Contact at examiner;staring   Affect incongruent;tense;irritable   Mood labile;irritable   Physical Appearance/Attire attire appropriate to age and situation   Hygiene well groomed   Suicidality other (see comments)  (denies)   1. Wish to be Dead No   2. Non-Specific Active Suicidal Thoughts  No   Self Injury other (see comment)  (denies)   Activity restless   Speech other (see comments)  (delayed/mute at times)   Medication Sensitivity no stated side effects;no observed side effects   Psychomotor / Gait balanced;steady;paces   Psycho Education   Type of Intervention 1:1 intervention   Response participates with encouragement   Hours 0.5   Treatment Detail check in   Activities of Daily Living   Hygiene/Grooming independent;shower   Oral Hygiene independent   Dress independent   Room Organization prompts

## 2018-12-10 NOTE — PROGRESS NOTES
Writer met with patient and her parents today. Parents informed writer about documentation that the court needs regarding a hearing patient has on December 20th. Parent's provided writer with the court information to send letter stating that she is hospitalized and unable to attend this appointment. Writer will follow up with court.

## 2018-12-10 NOTE — PROGRESS NOTES
Dannie continues labile mood-sudden episodes of agitation, yelling out that others are evil or demanding someone give her a job-accepted Haldol 5 mg with Benadryl 50 mg at 1630 with small amt decrease in agitated behavior-continues to require freq redirection re intrusive behaviors and reassurance she is in safe and supportive situation

## 2018-12-11 PROCEDURE — 25000132 ZZH RX MED GY IP 250 OP 250 PS 637: Performed by: STUDENT IN AN ORGANIZED HEALTH CARE EDUCATION/TRAINING PROGRAM

## 2018-12-11 PROCEDURE — 99232 SBSQ HOSP IP/OBS MODERATE 35: CPT | Mod: GC | Performed by: PSYCHIATRY & NEUROLOGY

## 2018-12-11 PROCEDURE — 12400003 ZZH R&B MH CRITICAL UMMC

## 2018-12-11 RX ORDER — CLOZAPINE 25 MG/1
25 TABLET ORAL AT BEDTIME
Status: DISCONTINUED | OUTPATIENT
Start: 2018-12-11 | End: 2018-12-12

## 2018-12-11 RX ADMIN — CLOZAPINE 25 MG: 25 TABLET ORAL at 21:55

## 2018-12-11 RX ADMIN — DIPHENHYDRAMINE HYDROCHLORIDE 50 MG: 25 CAPSULE ORAL at 03:58

## 2018-12-11 RX ADMIN — LORAZEPAM 2 MG: 2 TABLET ORAL at 21:55

## 2018-12-11 RX ADMIN — LORAZEPAM 1 MG: 1 TABLET ORAL at 13:58

## 2018-12-11 RX ADMIN — DOCUSATE SODIUM 250 MG: 250 CAPSULE, LIQUID FILLED ORAL at 08:32

## 2018-12-11 RX ADMIN — HALOPERIDOL 5 MG: 5 TABLET ORAL at 08:37

## 2018-12-11 RX ADMIN — DIPHENHYDRAMINE HYDROCHLORIDE 50 MG: 25 CAPSULE ORAL at 10:05

## 2018-12-11 RX ADMIN — LORAZEPAM 1 MG: 1 TABLET ORAL at 08:32

## 2018-12-11 RX ADMIN — HALOPERIDOL 5 MG: 5 TABLET ORAL at 03:58

## 2018-12-11 RX ADMIN — OLANZAPINE 30 MG: 15 TABLET, FILM COATED ORAL at 21:55

## 2018-12-11 ASSESSMENT — ACTIVITIES OF DAILY LIVING (ADL)
DRESS: SCRUBS (BEHAVIORAL HEALTH);STREET CLOTHES
DRESS: SCRUBS (BEHAVIORAL HEALTH)
ORAL_HYGIENE: INDEPENDENT;PROMPTS
LAUNDRY: UNABLE TO COMPLETE
HYGIENE/GROOMING: HANDWASHING;SHOWER;INDEPENDENT
ORAL_HYGIENE: INDEPENDENT;PROMPTS
HYGIENE/GROOMING: INDEPENDENT;PROMPTS

## 2018-12-11 ASSESSMENT — MIFFLIN-ST. JEOR: SCORE: 1379.11

## 2018-12-11 NOTE — PROGRESS NOTES
Rambling, circumstantial. Angry, irritable. Aggressive posture, attempting to provoke others at times. Ate meal.        12/10/18 [facility]   Behavioral Health   Hallucinations appears responding   Thinking confused;distractable;delusional;paranoid   Orientation situation, disoriented;person, disoriented;date, disoriented;time, disoriented   Memory confabulation   Insight poor   Judgement impaired   Eye Contact staring;at examiner   Affect tense;irritable;angry   Mood labile;irritable   Physical Appearance/Attire attire appropriate to age and situation   Hygiene other (see comment)  (adequate)   Activity hyperactive (agitated, impulsive)   Speech rambling;circumstantial   Psychomotor / Gait balanced;steady;paces   Psycho Education   Type of Intervention structured groups   Response refuses   Activities of Daily Living   Hygiene/Grooming independent   Oral Hygiene independent   Dress independent   Laundry unable to complete   Room Organization prompts

## 2018-12-11 NOTE — PROGRESS NOTES
"    ----------------------------------------------------------------------------------------------------------  [facility], [place]   Psychiatric Progress Note  Hospital Day #20     Interim History:   The patient's care was discussed with the treatment team and chart notes were reviewed.    Sleep: 5.75 hrs  Scheduled Medications: Received.  PRN Medications: Acetaminophen x2 (neck/shoulder pain), olanzapine x1, haldol x1, diphenhydramine x1.  Staff Report: Had frequent episodes of sobbing and yelling at staff during the day. Repeatedly attempted to pray for patients and staff, sometimes by touching them directly. Throughout the evening, was irritable and angry, speaking in a rambling manner, and assumed an aggressive posture. Awoke again overnight with c/o difficulty sleeping and started pacing and crying but was able to return to sleep after accepting medication.    Patient Interview: Ms. Davis was interviewed in the station 22 conference room. It was difficult to elicit and clarify responses from her. When asked how she was doing today, she responded with a word that sounded like either \"frightened\" or \"trying.\" She had a shower this morning and feels good being clean. She had neck and shoulder pain yesterday but denies pain currently. No eye pain or itching. She then requested to leave the room.    The risks, benefits, alternatives and side effects of any medication changes have been discussed and are understood by the patient and other caregivers.    Review of Systems:   See HPI for pertinent ROS.          Allergies:     Allergies   Allergen Reactions     Septra [Sulfamethoxazole W/Trimethoprim]             Psychiatric Examination:   /81   Pulse 110   Temp 98.7  F (37.1  C) (Tympanic)   Resp 18   Ht 1.702 m (5' 7\")   Wt 59.6 kg (131 lb 8 oz)   SpO2 100%   BMI 20.60 kg/m    Weight is 131 lbs 8 oz  Body mass index is 20.6 kg/m .    Appearance: Awake, alert. Wearing " "hospital scrubs. Good hygiene.  Attitude: cooperative.   Eye Contact: prolonged staring, intense.  Mood: ?\"Frightened.\"   Affect: Frightened.  Speech: Delayed speech latency. Poverty of speech. Normal rate and volume.   Psychomotor Behavior: Stereotypic eye rubbing. No evidence of tardive dyskinesia, dystonia or tics.   Thought Process: Disorganized.  Associations: No loose associations.  Thought Content: Concern that family members and staff are \"evil.\"  Insight: Poor.  Judgment: Poor.  Attention Span and Concentration: Limited.  Recent and Remote Memory: Difficult to assess.  Language: Speaking fluent English with strong accent.  Fund of Knowledge: Difficult to assess.  Muscle Strength and Tone: Appearance is grossly normal.  Gait and Station: Slow gait. Normal station.     Labs:     No results found for this or any previous visit (from the past 24 hour(s)).     Assessment    Diagnostic Impression: Ms. Davis is a 23 y.o. F with a psychiatric history of MDD with psychotic features who presented to the [facility] ED via EMS on 11/21/2018 due to psychosis in the setting of medication non-adherence. Her primary presentation is psychosis, as evidenced by reported and witnessed visual and auditory hallucinations including command hallucinations taking the form of her father, delusions that her father is trying to poison her, stereotypic hand movements, writhing leg movements, echopraxia, and delayed speech latency. Demonstrates emotional lability from calm baseline, with rapid transition to and from sobbing or dancing/singing/laughing. Sleep maintenance is poor and fluctuating. Given her prior diagnosis of MDD with psychosis and her current presentation with acute psychosis in the absence of depressive sxs, a thought disorder seems to be the most likely pathology. Ddx includes schizoaffective disorder and less likely bipolar depression or MDD with psychotic features. Current symptoms, along with initial improvement with " lorazepam suggest component of catatonia to her current unspecified condition.     Hospital Course: Ms. Davis was admitted to [facility] on a 72 hour hold. Treatment team filed for commitment and [facility] which have been received. Olanzapine was started and uptitrated. PRN hydroxyzine and trazodone were initiated. Some concern for catatonia given poor olanzapine response and was given lorazepam with marked clinical improvement. Scheduled lorazepam was started. Not in full remission with olanzapine so attempted transition to aripiprazole which she has previously taken with good reported response, but she had recurrence of delusions, increased lability, new hyperactivity/agitation, and worsening sleep maintenance, so she is transitioning back to olanzapine. After demonstrating poor symptom improvement, clozapine was started and uptitrated.     Medical Course: CMP, lipid panel, TSH, B12/folate, CBC, sed rate, lyme, treponema, HIV, EMERALD, ceruloplasmin, ECG WNL. Vitamin D low. UA non-infectious. Demonstrated orthostasis, attributable to up-titration of olanzapine. Repeat CBC w/ diff WNL.    Plan   Principal Diagnosis:   # Unspecified Schizophrenia Spectrum and Other Psychotic Disorder  # R/O Catatonia    Psychotropic Medications:  Modify:  - Increase clozapine to 25 mg at bedtime    Continue:  - Olanzapine 30 mg at bedtime   - Haldol 5 mg Q4H PRN (to use if daily olanzapine total exceeds 40mg)  - Diphenhydramine 50 mg Q4H PRN  - Lorazepam 2 mg at bedtime, 1 mg BID  - Cogentin 1mg BID PRN for akathisia/dystonia  - Olanzapine 10 mg Q2H PRN  - Hydroxyzine 25 mg Q4H PRN  - Trazodone 50 mg at bedtime PRN    Patient will be treated in therapeutic milieu with appropriate individual and group therapies as described.    Medical diagnoses to be addressed this admission:    # Vitamin D Deficiency  - Continue ergocalciferol 50,000 Units Q7 days    # GI/FEN  - Docusate 250 mg daily  - Mylanta/Maalox QID PRN    # Musculoskeletal Pain  -  Acetaminophen PRN  - Menthol Topical Analgesic Q6H PRN    Data: As above.  Consults: None.    Legal Status: Commitment with [facility].    Safety Assessment:   Behavioral Orders   Procedures     Code 1 - Restrict to Unit     Elopement precautions     Routine Programming     As clinically indicated     Single Room     Status 15     Every 15 minutes.     Status Individual Observation     Order Specific Question:   CONTINUOUS 24 hours / day     Answer:   5 feet     Order Specific Question:   Indications for SIO     Answer:   Severe intrusiveness       Disposition: TBD pending clinical stabilization.    Kelli Aguilar, MS3    I have reviewed and edited the documentation recorded by the medical student. This documentation accurately reflects the services I personally performed and treatment decisions made by me in consultation with the attending physician, Mauro Vann MD.    Maximiliano Bates MD  PGY-1 Resident  Pager: [phone]         Attestation:  This patient has been seen and evaluated by me, Mauro Vann.  I have discussed this patient with the house staff team including the resident and medical student and I agree with the findings and plan in this note.    I have reviewed today's vital signs, medications, labs and imaging. Mauro Vann MD

## 2018-12-11 NOTE — PROGRESS NOTES
Writer received pt asleep in room at start of shift. Pt awoke around 0315, was restless and distractable but fell asleep again at 0445 after taking medications.     While awake, pt wandered in the lounge aimlessly, was mostly non-verbal and required multiple prompts before she would speak. Pt observed crying in room at times, coming in and out of room repeatedly to pace, coming to  but not saying what we could do to help her. Pt complained of inability to stay asleep and seemed frustrated/iritated by this--did not want meds at first, but eventually requested sleep meds and fell asleep within 45mins after taking them. Pt remained sleeping rest of night.     Pt slept for a total of 5.75 hrs tonight.

## 2018-12-11 NOTE — PROGRESS NOTES
"Pt was anxious, staring at everyone around her paranoid. Pt approached male staff in the AM and told them, \"I will not let you put it in my butt!\". She told a pt pacing the halls \"Why don't you sit down\". She entered OT group and said \"I'm not doing shit\", repeatedly. Pt had poor boundaries with other PT's following them and closely approaching them when they were sitting in the lounge. Pt was difficult to redirect, would yell at staff.   "

## 2018-12-11 NOTE — PROGRESS NOTES
Gave patient a prn Haldol with morning medications because of crying/yelling in room for half an hour. Patient refused the prn medication and 0800 medications for 15 minutes but finally agreed to take them. Middle morning patient tried to go into the blue team room and was following another patient around and getting in to staffs face. Benadryl 50 mg administered.

## 2018-12-12 PROCEDURE — 25000132 ZZH RX MED GY IP 250 OP 250 PS 637: Performed by: STUDENT IN AN ORGANIZED HEALTH CARE EDUCATION/TRAINING PROGRAM

## 2018-12-12 PROCEDURE — 99232 SBSQ HOSP IP/OBS MODERATE 35: CPT | Mod: GC | Performed by: PSYCHIATRY & NEUROLOGY

## 2018-12-12 PROCEDURE — 12400003 ZZH R&B MH CRITICAL UMMC

## 2018-12-12 PROCEDURE — H2032 ACTIVITY THERAPY, PER 15 MIN: HCPCS

## 2018-12-12 RX ORDER — CLOZAPINE 25 MG/1
50 TABLET ORAL AT BEDTIME
Status: DISCONTINUED | OUTPATIENT
Start: 2018-12-12 | End: 2018-12-13

## 2018-12-12 RX ORDER — POLYETHYLENE GLYCOL 3350 17 G/17G
17 POWDER, FOR SOLUTION ORAL DAILY
Status: DISCONTINUED | OUTPATIENT
Start: 2018-12-12 | End: 2019-01-18 | Stop reason: HOSPADM

## 2018-12-12 RX ADMIN — ACETAMINOPHEN 650 MG: 325 TABLET, FILM COATED ORAL at 22:50

## 2018-12-12 RX ADMIN — LORAZEPAM 1 MG: 1 TABLET ORAL at 14:05

## 2018-12-12 RX ADMIN — LORAZEPAM 1 MG: 1 TABLET ORAL at 08:37

## 2018-12-12 RX ADMIN — CLOZAPINE 50 MG: 25 TABLET ORAL at 21:40

## 2018-12-12 RX ADMIN — LORAZEPAM 2 MG: 2 TABLET ORAL at 21:40

## 2018-12-12 RX ADMIN — DOCUSATE SODIUM 250 MG: 250 CAPSULE, LIQUID FILLED ORAL at 08:36

## 2018-12-12 RX ADMIN — POLYETHYLENE GLYCOL 3350 17 G: 17 POWDER, FOR SOLUTION ORAL at 14:02

## 2018-12-12 RX ADMIN — OLANZAPINE 30 MG: 15 TABLET, FILM COATED ORAL at 21:40

## 2018-12-12 ASSESSMENT — ACTIVITIES OF DAILY LIVING (ADL)
ORAL_HYGIENE: INDEPENDENT
DRESS: INDEPENDENT
ORAL_HYGIENE: INDEPENDENT
LAUNDRY: UNABLE TO COMPLETE
HYGIENE/GROOMING: SHOWER;INDEPENDENT
DRESS: INDEPENDENT;SCRUBS (BEHAVIORAL HEALTH)
HYGIENE/GROOMING: INDEPENDENT

## 2018-12-12 NOTE — PROGRESS NOTES
Attended 1 of 4 music therapy groups. Intervention focused on improving reality orientation. Pt was not responsive to writer's questions, but responded to various prompts (sitting down). Pt cried intermittently throughout group, and sang different songs loudly. Pt appeared to calm briefly while listening to a song she was singing. Flat affect.

## 2018-12-12 NOTE — PLAN OF CARE
Dannie continues to pace slowly in hallway. She occasionally yells out. Her affect is flat. She continues to show poor boundaries, approaching both staff and other patients too closely. She attended community meeting, but stared. Did not answer when it was her turn. Needs redirection when shouting in lounge while peers trying to watch TV. Number of hours of sleep have improved. Dannie could not seem to sit or stay in one place for long, restless.

## 2018-12-12 NOTE — PROGRESS NOTES
"    ----------------------------------------------------------------------------------------------------------  [facility], [place]   Psychiatric Progress Note  Hospital Day #21     Interim History:   The patient's care was discussed with the treatment team and chart notes were reviewed.    Sleep: 6.25 hrs  Scheduled Medications: Received.  PRN Medications: Haldol x2, diphenhydramine x2.  Staff Report: Appeared anxious and paranoid yesterday. Frequently pacing the halls and closely following other patients around, poor boundaries. Difficult to redirect, sometimes yelling and swearing at staff. No acute events overnight.    Patient Interview: Ms. Davis was interviewed in her room. She stood throughout the interview sometimes facing away from interviewers and responded to few questions. Arms flexed entire interview holding towel to wipe tears and hold journal. She volunteered \"I want to get \" but did not expound on this when asked. When asked what is bothering her, she expressed the concern that \"I don't know my own story.\" When given a summary of how she came in and why she is here, she was thankful. Endorses constipation and painful bowel movements.     The risks, benefits, alternatives and side effects of any medication changes have been discussed and are understood by the patient and other caregivers.    Review of Systems:   See HPI for pertinent ROS.          Allergies:     Allergies   Allergen Reactions     Septra [Sulfamethoxazole W/Trimethoprim]             Psychiatric Examination:   /79 (BP Location: Left arm)   Pulse 115   Temp 99.9  F (37.7  C) (Tympanic)   Resp 16   Ht 1.702 m (5' 7\")   Wt 59.6 kg (131 lb 8 oz)   SpO2 97%   BMI 20.60 kg/m    Weight is 131 lbs 8 oz  Body mass index is 20.6 kg/m .    Appearance: Awake, alert. Wearing hospital scrubs. Disheveled.  Attitude: Variable - unresponsive to cooperative.  Eye Contact: Initially, facing away from " interviewers and later staring.  Mood: Unable to elicit.  Affect: Initially tearful, later anxious.  Speech: Increased speech latency. Poverty of speech. Near normal rate and volume.   Psychomotor Behavior: Standing entire interview. Deep, noisy breathing. Stereotypic eye rubbing. Maintained position of R arm in air after rubbing eye. Paucity of movement. No evidence of tardive dyskinesia, dystonia or tics.   Thought Process: Disorganized.  Associations: No loose associations.  Thought Content: Ongoing delusions that family is evil.  Insight: Poor.  Judgment: Poor.  Attention Span and Concentration: Limited.  Recent and Remote Memory: Difficult to assess.  Language: Speaking fluent English with strong accent.  Fund of Knowledge: Difficult to assess.  Muscle Strength and Tone: Appearance is grossly normal.  Gait and Station: Slow gait. Normal station.     Labs:     No results found for this or any previous visit (from the past 24 hour(s)).     Assessment    Diagnostic Impression: Ms. Davis is a 23 y.o. F with a psychiatric history of MDD with psychotic features who presented to the [facility] ED via EMS on 11/21/2018 due to psychosis in the setting of medication non-adherence. Her primary presentation is psychosis, as evidenced by reported and witnessed visual and auditory hallucinations including command hallucinations taking the form of her father, delusions that her father is trying to poison her, stereotypic hand movements, writhing leg movements, echopraxia, and delayed speech latency. Demonstrates emotional lability from calm baseline, with rapid transition to and from sobbing or dancing/singing/laughing. Sleep maintenance is poor and fluctuating. Given her prior diagnosis of MDD with psychosis and her current presentation with acute psychosis in the absence of depressive sxs, a thought disorder seems to be the most likely pathology. Ddx includes schizoaffective disorder and less likely bipolar depression or MDD  with psychotic features. Current symptoms, along with initial improvement with lorazepam suggest component of catatonia to her current unspecified condition.     Hospital Course: Ms. Davis was admitted to [facility] on a 72 hour hold. Treatment team filed for commitment and [facility] which have been received. Olanzapine was started and uptitrated. PRN hydroxyzine and trazodone were initiated. Some concern for catatonia given poor olanzapine response and was given lorazepam with marked clinical improvement. Scheduled lorazepam was started. Not in full remission with olanzapine so attempted transition to aripiprazole which she has previously taken with good reported response, but she had recurrence of delusions, increased lability, new hyperactivity/agitation, and worsening sleep maintenance, so she is transitioning back to olanzapine. After demonstrating poor symptom improvement, clozapine was started and uptitrated.     Medical Course: CMP, lipid panel, TSH, B12/folate, CBC, sed rate, lyme, treponema, HIV, EMERALD, ceruloplasmin, ECG WNL. Vitamin D low. UA non-infectious. Demonstrated orthostasis, attributable to up-titration of olanzapine. Repeat CBC w/ diff WNL. Bowel regimen initiated for constipation.    Plan   Principal Diagnosis:   # Unspecified Schizophrenia Spectrum and Other Psychotic Disorder  # R/O Catatonia    Psychotropic Medications:  Modify:  - Increase clozapine to 50 mg at bedtime    Continue:  - Olanzapine 30 mg at bedtime   - Haldol 5 mg Q4H PRN (to use if daily olanzapine total exceeds 40mg)  - Diphenhydramine 50 mg Q4H PRN  - Lorazepam 2 mg at bedtime, 1 mg BID  - Cogentin 1mg BID PRN for akathisia/dystonia  - Olanzapine 10 mg Q2H PRN  - Hydroxyzine 25 mg Q4H PRN  - Trazodone 50 mg at bedtime PRN    Patient will be treated in therapeutic milieu with appropriate individual and group therapies as described.    Medical diagnoses to be addressed this admission:    # Vitamin D Deficiency  - Continue  ergocalciferol 50,000 Units Q7 days    # Constipation  - Start Miralax 17 g daily  - Docusate 250 mg daily  - Mylanta/Maalox QID PRN    # Musculoskeletal Pain  - Acetaminophen PRN  - Menthol Topical Analgesic Q6H PRN    Data: As above.  Consults: None.    Legal Status: Commitment with [facility].    Safety Assessment:   Behavioral Orders   Procedures     Code 1 - Restrict to Unit     Elopement precautions     Routine Programming     As clinically indicated     Single Room     Status 15     Every 15 minutes.     Status Individual Observation     Order Specific Question:   CONTINUOUS 24 hours / day     Answer:   5 feet     Order Specific Question:   Indications for SIO     Answer:   Severe intrusiveness       Disposition: TBD pending clinical stabilization.    Kelli Aguilar, MS3    I was present with the medical student who participated in the service and in the  documentation of the note. I have verified the history and personally performed the  exam and medical decision making. I agree with the assessment and plan of  care as documented in the note.    Naresh Manriquez  December 12, 2018  [phone]      Attestation:  This patient has been seen and evaluated by me, Mauro Vann.  I have discussed this patient with the house staff team including the resident and medical student and I agree with the findings and plan in this note.    I have reviewed today's vital signs, medications, labs and imaging. Mauro Vann MD

## 2018-12-12 NOTE — PROGRESS NOTES
"Pt has had periodic episodes of sobbing/yelling throughout the day.  Paranoid and restless.  Appears to be responding to internal stimuli.    Voices are telling her staff thinks she's stealing.  Pt called one staff a thief.  Difficult to redirect, difficult to get a response from pt.  Continues to stare at others and have poor boundaries.  Pt showered and later asked to shower again, when writer reminded her showered already today pt responded \"I don't remember\".  During group pt was staring at another pt and the other pt asked \"why are you staring at me\" Dannie responded saying \"you are on my list\" and needed to be redirected out of group.  When getting her medications, pt spit out her drink.  No SI/SIB observed.       12/12/18 1400   Behavioral Health   Hallucinations appears responding   Thinking confused;distractable;paranoid;poor concentration   Orientation situation, disoriented   Memory other (see comment)  (SALAS)   Insight poor   Judgement impaired   Eye Contact staring;at examiner   Affect blunted, flat;tense;irritable   Mood anxious;labile   Physical Appearance/Attire disheveled   Hygiene well groomed   Suicidality other (see comments)  (none observed)   1. Wish to be Dead No   2. Non-Specific Active Suicidal Thoughts  No   Self Injury other (see comment)  (none observed)   Activity restless   Speech rambling;circumstantial;other (see comments)  (difficult to understand)   Medication Sensitivity no observed side effects   Psychomotor / Gait balanced;steady   Psycho Education   Type of Intervention 1:1 intervention   Response observes from a distance   Activities of Daily Living   Hygiene/Grooming shower;independent   Oral Hygiene independent   Dress independent;scrubs (behavioral health)   Laundry unable to complete   Room Organization independent     "

## 2018-12-13 PROCEDURE — 25000132 ZZH RX MED GY IP 250 OP 250 PS 637: Performed by: STUDENT IN AN ORGANIZED HEALTH CARE EDUCATION/TRAINING PROGRAM

## 2018-12-13 PROCEDURE — G0177 OPPS/PHP; TRAIN & EDUC SERV: HCPCS

## 2018-12-13 PROCEDURE — 12400003 ZZH R&B MH CRITICAL UMMC

## 2018-12-13 PROCEDURE — 99232 SBSQ HOSP IP/OBS MODERATE 35: CPT | Mod: GC | Performed by: PSYCHIATRY & NEUROLOGY

## 2018-12-13 RX ORDER — IBUPROFEN 200 MG
400 TABLET ORAL EVERY 6 HOURS PRN
Status: DISCONTINUED | OUTPATIENT
Start: 2018-12-13 | End: 2018-12-31

## 2018-12-13 RX ORDER — CLOZAPINE 25 MG/1
75 TABLET ORAL AT BEDTIME
Status: DISCONTINUED | OUTPATIENT
Start: 2018-12-13 | End: 2018-12-14

## 2018-12-13 RX ADMIN — POLYETHYLENE GLYCOL 3350 17 G: 17 POWDER, FOR SOLUTION ORAL at 08:17

## 2018-12-13 RX ADMIN — LORAZEPAM 1 MG: 1 TABLET ORAL at 14:53

## 2018-12-13 RX ADMIN — LORAZEPAM 1 MG: 1 TABLET ORAL at 08:44

## 2018-12-13 RX ADMIN — DOCUSATE SODIUM 250 MG: 250 CAPSULE, LIQUID FILLED ORAL at 08:44

## 2018-12-13 RX ADMIN — CLOZAPINE 75 MG: 25 TABLET ORAL at 21:40

## 2018-12-13 RX ADMIN — ACETAMINOPHEN 650 MG: 325 TABLET, FILM COATED ORAL at 10:03

## 2018-12-13 RX ADMIN — OLANZAPINE 30 MG: 15 TABLET, FILM COATED ORAL at 21:41

## 2018-12-13 RX ADMIN — LORAZEPAM 2 MG: 2 TABLET ORAL at 21:41

## 2018-12-13 ASSESSMENT — ACTIVITIES OF DAILY LIVING (ADL)
DRESS: INDEPENDENT
HYGIENE/GROOMING: INDEPENDENT;PROMPTS
DRESS: INDEPENDENT
ORAL_HYGIENE: INDEPENDENT
ORAL_HYGIENE: INDEPENDENT
LAUNDRY: WITH SUPERVISION
HYGIENE/GROOMING: INDEPENDENT;PROMPTS

## 2018-12-13 ASSESSMENT — MIFFLIN-ST. JEOR: SCORE: 1379.11

## 2018-12-13 NOTE — PROGRESS NOTES
"    ----------------------------------------------------------------------------------------------------------  [facility], [place]   Psychiatric Progress Note  Hospital Day #22     Interim History:   The patient's care was discussed with the treatment team and chart notes were reviewed.    Sleep: 6.25 hrs  Scheduled Medications: Received.  PRN Medications: Acetaminophen x1.  Staff Report: Restless and pacing the halls. Demonstrating poor boundaries and difficult to redirect. Hearing voices that are telling her that our staff think she is stealing. Concerned that people are evil. Requested a shower several times during the day apparently having forgotten that she already had a shower that day. Noted to spit out her drink after having been given her medications. No acute events overnight.    Patient Interview: Ms. Davis was interviewed in the lounge where she was sitting eating lunch with her parents. She responded to few questions. She is feeling \"fine\" today. She expressed desires to get ,  a doctor, see her child, and get off the unit. She does not feel staff are treating her well but is unable to expound when prompted. No neck pain currently. Discussed treatment course and plan with her parents.     The risks, benefits, alternatives and side effects of any medication changes have been discussed and are understood by the patient and other caregivers.    Review of Systems:   See HPI for pertinent ROS.          Allergies:     Allergies   Allergen Reactions     Septra [Sulfamethoxazole W/Trimethoprim]             Psychiatric Examination:   /88   Pulse 111   Temp 99.7  F (37.6  C) (Tympanic)   Resp 16   Ht 1.702 m (5' 7\")   Wt 59.6 kg (131 lb 8 oz)   SpO2 97%   BMI 20.60 kg/m    Weight is 131 lbs 8 oz  Body mass index is 20.6 kg/m .    Appearance: Awake, alert. Wearing hospital scrubs. Disheveled.  Attitude: Guarded.  Eye Contact: Staring.  Mood: \"Fine.\"  Affect: " Restricted.   Speech: Increased speech latency. Poverty of speech. Near normal rate and volume.   Psychomotor Behavior: Paucity of movement. No evidence of stereotypic movements, tardive dyskinesia, dystonia or tics.   Thought Process: Disorganized.  Associations: No loose associations.  Thought Content: Per staff report, auditory hallucinations telling her staff think she's stealing, delusions that other people are evil.  Insight: Poor.  Judgment: Poor.  Attention Span and Concentration: Limited.  Recent and Remote Memory: Difficult to assess.  Language: Speaking fluent English with strong accent.  Fund of Knowledge: Difficult to assess.  Muscle Strength and Tone: Appearance is grossly normal.  Gait and Station: Slow gait. Normal station.     Labs:     No results found for this or any previous visit (from the past 24 hour(s)).     Assessment    Diagnostic Impression: Ms. Davis is a 23 y.o. F with a psychiatric history of MDD with psychotic features who presented to the [facility] ED via EMS on 11/21/2018 due to psychosis in the setting of medication non-adherence. Her primary presentation is psychosis, as evidenced by reported and witnessed visual and auditory hallucinations including command hallucinations taking the form of her father, delusions that her father is trying to poison her, stereotypic hand movements, writhing leg movements, echopraxia, and delayed speech latency. Demonstrates emotional lability from calm baseline, with rapid transition to and from sobbing or dancing/singing/laughing. Sleep maintenance is poor and fluctuating. Given her prior diagnosis of MDD with psychosis and her current presentation with acute psychosis in the absence of depressive sxs, a thought disorder seems to be the most likely pathology. Ddx includes schizoaffective disorder and less likely bipolar depression or MDD with psychotic features. Current symptoms, along with initial improvement with lorazepam suggest component of  catatonia to her current unspecified condition.     Hospital Course: Ms. Davis was admitted to Station [facility] on a 72 hour hold. Treatment team filed for commitment and [employer] which have been received. Olanzapine was started and uptitrated. PRN hydroxyzine and trazodone were initiated. Some concern for catatonia given poor olanzapine response and was given lorazepam with marked clinical improvement. Scheduled lorazepam was started. Not in full remission with olanzapine so attempted transition to aripiprazole which she has previously taken with good reported response, but she had recurrence of delusions, increased lability, new hyperactivity/agitation, and worsening sleep maintenance, so she is transitioning back to olanzapine. After demonstrating poor symptom improvement, clozapine was started and uptitrated.     Medical Course: CMP, lipid panel, TSH, B12/folate, CBC, sed rate, lyme, treponema, HIV, EMERALD, ceruloplasmin, ECG WNL. Vitamin D low. UA non-infectious. Demonstrated orthostasis, attributable to up-titration of olanzapine. Repeat CBC w/ diff WNL. Bowel regimen initiated for constipation.    Plan   Principal Diagnosis:   # Unspecified Schizophrenia Spectrum and Other Psychotic Disorder  # R/O Catatonia    Psychotropic Medications:  Modify:  - Increase clozapine to 75 mg at bedtime    Continue:  - Olanzapine 30 mg at bedtime   - Haldol 5 mg Q4H PRN (to use if daily olanzapine total exceeds 40mg)  - Diphenhydramine 50 mg Q4H PRN  - Lorazepam 2 mg at bedtime, 1 mg BID  - Cogentin 1mg BID PRN for akathisia/dystonia  - Olanzapine 10 mg Q2H PRN  - Hydroxyzine 25 mg Q4H PRN  - Trazodone 50 mg at bedtime PRN    Patient will be treated in therapeutic milieu with appropriate individual and group therapies as described.    Medical diagnoses to be addressed this admission:    # Vitamin D Deficiency  - Continue ergocalciferol 50,000 Units Q7 days    # Constipation  - Start Miralax 17 g daily  - Docusate 250 mg daily  -  Mylanta/Maalox QID PRN    # Headache/Neck Pain   - Start ibuprofen 400 mg Q6H PRN  - Acetaminophen PRN  - Menthol Topical Analgesic Q6H PRN    Data: As above.  Consults: None.    Legal Status: Commitment with [employer].    Safety Assessment:   Behavioral Orders   Procedures     Code 1 - Restrict to Unit     Elopement precautions     Routine Programming     As clinically indicated     Single Room     Status 15     Every 15 minutes.     Status Individual Observation     Order Specific Question:   CONTINUOUS 24 hours / day     Answer:   5 feet     Order Specific Question:   Indications for SIO     Answer:   Severe intrusiveness       Disposition: TBD pending clinical stabilization.    Kelli Aguilar, MS3    I was present with the medical student who participated in the service and in the  documentation of the note. I have verified the history and personally performed the  exam and medical decision making. I agree with the assessment and plan of  care as documented in the note.    Naresh Manriquez  December 13, 2018  [phone]    Attestation:  This patient has been seen and evaluated by me, Mauro Vann.  I have discussed this patient with the house staff team including the resident and medical student and I agree with the findings and plan in this note.    I have reviewed today's vital signs, medications, labs and imaging. Mauro Vann MD

## 2018-12-13 NOTE — PLAN OF CARE
BEHAVIORAL TEAM DISCUSSION    Participants: Dr. Mauro Manriquez PGY-1  [facility]  Progress: No improvement   Continued Stay Criteria/Rationale: Under commitment and [employer] order. Patient continues to be disorganized, paranoid and responding to internal stimuli.   Medical/Physical: see psychiatry physician progress note for further details   Precautions:   Behavioral Orders   Procedures     Code 1 - Restrict to Unit     Elopement precautions     Routine Programming     As clinically indicated     Single Room     Status 15     Every 15 minutes.     Status Individual Observation     Order Specific Question:   CONTINUOUS 24 hours / day     Answer:   5 feet     Order Specific Question:   Indications for SIO     Answer:   Severe intrusiveness     Plan: Continue to monitor symptoms while Clozapine is being titrated to a therapeutic level.   Rationale for change in precautions or plan: Continues to remain on a 1:1 staffing due to disorganization and intrusive behavior.

## 2018-12-13 NOTE — PROGRESS NOTES
12/13/18 5958   Behavioral Health   Hallucinations auditory;appears responding   Thinking confused;paranoid;poor concentration   Insight poor   Judgement impaired   Eye Contact staring       Patient woke up confused and paranoid. Patient wasn't responding the writer, patient staring at the writer and other staffs. It took several times to try to redirected the patient to stay in room.

## 2018-12-13 NOTE — PROGRESS NOTES
"Pt was anxious, often staring at others appearing paranoid. Pt would request a shower multiple times but then appear fearful to get into the shower. She eventually entered the shower after many prompts from staff, she asked \"Are you sure it's ok\" when getting in the shower. Pt was tense and cursed at staff this morning. Pt attended group and colored. She had a good visit with her parents this morning.      12/13/18 1400   Behavioral Health   Hallucinations appears responding;auditory   Thinking distractable;confused;paranoid   Orientation situation, disoriented;person: oriented   Insight poor   Judgement impaired   Eye Contact staring   Affect tense   Mood anxious   Physical Appearance/Attire attire appropriate to age and situation   Hygiene well groomed   Suicidality other (see comments)  (denies)   1. Wish to be Dead No   2. Non-Specific Active Suicidal Thoughts  No   Self Injury other (see comment)  (denies)   Elopement Hallucinations directing behavior   Activity restless;withdrawn   Medication Sensitivity no observed side effects;no stated side effects   Psychomotor / Gait balanced;steady   Activities of Daily Living   Hygiene/Grooming independent;prompts   Oral Hygiene independent   Dress independent   Laundry with supervision   Room Organization independent   Activity   Activity Assistance Provided independent     "

## 2018-12-13 NOTE — PROGRESS NOTES
Pt had a restless shift, pacing in hallway, poor boundaries with staff and peers. Saying that people are evil. Stated that they want to go, when asked where they want to go pt states they want to breath fresh air. Very disorganized. Pt on a couple of occasions requested to take a shower and then stood in shower for several minutes without turning the water on. Eventually the pt did take a shower with prompting. Pt endorses AH, appears responding and fearful. Pt seems paranoid at times, irritable with writer about blinking their eyes too much. Pt did not eat evening meal. Not observed drinking fluids without prompting.      12/12/18 2200   Behavioral Health   Hallucinations auditory;appears responding   Thinking delusional;paranoid;confused   Orientation situation, disoriented   Insight poor   Judgement impaired   Eye Contact staring   Affect tense   Mood anxious   Physical Appearance/Attire attire appropriate to age and situation   Hygiene well groomed   Suicidality other (see comments)  (denies)   1. Wish to be Dead No   2. Non-Specific Active Suicidal Thoughts  No   Self Injury other (see comment)  (denies )   Elopement Hypervigilance to activities on and off the unit;Statements about wanting to leave   Activity restless   Speech tangential   Medication Sensitivity no observed side effects   Psychomotor / Gait paces;balanced;steady   Psycho Education   Type of Intervention 1:1 intervention   Response participates with encouragement   Hours 0.5   Treatment Detail check in    Activities of Daily Living   Hygiene/Grooming independent   Oral Hygiene independent   Dress independent   Room Organization independent   Activity   Activity Assistance Provided independent

## 2018-12-14 PROCEDURE — 25000132 ZZH RX MED GY IP 250 OP 250 PS 637

## 2018-12-14 PROCEDURE — G0177 OPPS/PHP; TRAIN & EDUC SERV: HCPCS

## 2018-12-14 PROCEDURE — 12400003 ZZH R&B MH CRITICAL UMMC

## 2018-12-14 PROCEDURE — 25000132 ZZH RX MED GY IP 250 OP 250 PS 637: Performed by: STUDENT IN AN ORGANIZED HEALTH CARE EDUCATION/TRAINING PROGRAM

## 2018-12-14 PROCEDURE — 99232 SBSQ HOSP IP/OBS MODERATE 35: CPT | Mod: GC | Performed by: PSYCHIATRY & NEUROLOGY

## 2018-12-14 RX ORDER — CLOZAPINE 100 MG/1
100 TABLET ORAL AT BEDTIME
Status: DISCONTINUED | OUTPATIENT
Start: 2018-12-14 | End: 2018-12-14

## 2018-12-14 RX ORDER — CLOZAPINE 100 MG/1
100 TABLET ORAL AT BEDTIME
Status: COMPLETED | OUTPATIENT
Start: 2018-12-14 | End: 2018-12-15

## 2018-12-14 RX ADMIN — Medication: at 11:43

## 2018-12-14 RX ADMIN — POLYETHYLENE GLYCOL 3350 17 G: 17 POWDER, FOR SOLUTION ORAL at 10:20

## 2018-12-14 RX ADMIN — LORAZEPAM 1 MG: 1 TABLET ORAL at 10:20

## 2018-12-14 RX ADMIN — LORAZEPAM 2 MG: 2 TABLET ORAL at 21:31

## 2018-12-14 RX ADMIN — LORAZEPAM 1 MG: 1 TABLET ORAL at 13:01

## 2018-12-14 RX ADMIN — TRAZODONE HYDROCHLORIDE 50 MG: 50 TABLET ORAL at 21:32

## 2018-12-14 RX ADMIN — CLOZAPINE 100 MG: 100 TABLET ORAL at 21:31

## 2018-12-14 RX ADMIN — DOCUSATE SODIUM 250 MG: 250 CAPSULE, LIQUID FILLED ORAL at 10:20

## 2018-12-14 RX ADMIN — OLANZAPINE 30 MG: 15 TABLET, FILM COATED ORAL at 21:32

## 2018-12-14 ASSESSMENT — ACTIVITIES OF DAILY LIVING (ADL)
LAUNDRY: WITH SUPERVISION
ORAL_HYGIENE: INDEPENDENT
LAUNDRY: WITH SUPERVISION
ORAL_HYGIENE: INDEPENDENT
DRESS: SCRUBS (BEHAVIORAL HEALTH)
HYGIENE/GROOMING: INDEPENDENT
DRESS: SCRUBS (BEHAVIORAL HEALTH)
HYGIENE/GROOMING: INDEPENDENT

## 2018-12-14 NOTE — PROGRESS NOTES
12/14/18 1431   Behavioral Health   Hallucinations appears responding   Thinking confused;distractable;poor concentration   Orientation person: oriented;place: oriented;date: oriented;time: oriented   Memory baseline memory   Insight poor   Judgement impaired   Eye Contact at examiner   Affect irritable   Mood irritable   Physical Appearance/Attire attire appropriate to age and situation   Hygiene well groomed   Suicidality other (see comments)  (denies)   1. Wish to be Dead No   2. Non-Specific Active Suicidal Thoughts  No   Self Injury other (see comment)  (none observed)   Elopement (none observed)   Activity hyperactive (agitated, impulsive)   Speech rambling   Medication Sensitivity no stated side effects   Psychomotor / Gait balanced;steady   Activities of Daily Living   Hygiene/Grooming independent   Oral Hygiene independent   Dress scrubs (behavioral health)   Laundry with supervision   Room Organization independent     Pt was irritable and disorganized throughout the shift. Pt paced in the hallway listening to music. Pt attended one group, spirituality group. Pt started singing a Yarsanism song and started to get emotional and cry. Pt was given positive feedback about her singing from peers. Pt's mother and father visited and pt ate hospital meals and ate food her parents brought in. Pt took a shower in the morning. Pt became irritable when other staff had to attend to another patient and patient became confused and spilled ice water on the floor. Pt denies SI/SIB/HI/AH/VH or racing thoughts.

## 2018-12-14 NOTE — PROGRESS NOTES
"Pt was observed in the milieu to attend community meeting, walk the halls, and eat meals. Pt was observed throughout the early part of the evening saying \"evil witch\", and \"bitch\". While saying these things pt appeared tense and would not accept redirection. Later in the evening pt was observed laughing with staff and peers on the unit. Pt did not appear tense at these times. During check-in with writer pt denied experiencing hallucinations but did appear responding often talking to themselves in their room. When asked why they are in the hospital pt stated \"they say I am sick but I am not sick\". Pt repeated throughout the evening \"I want my baby\". Pt denied SI and SIB.        12/13/18 1949   Behavioral Health   Hallucinations appears responding  (denies)   Thinking distractable;confused;paranoid   Orientation situation, disoriented;person: oriented;place: oriented   Memory baseline memory   Insight poor   Judgement impaired   Eye Contact at examiner   Affect full range affect   Mood mood is calm;irritable   Physical Appearance/Attire attire appropriate to age and situation   Hygiene well groomed   Suicidality (denies)   1. Wish to be Dead No   2. Non-Specific Active Suicidal Thoughts  No   Self Injury (denies)   Activity restless   Speech rambling;coherent   Medication Sensitivity no stated side effects;no observed side effects   Psychomotor / Gait balanced;steady   Psycho Education   Type of Intervention 1:1 intervention   Response participates with encouragement   Hours 0.5   Treatment Detail (denies)   Activities of Daily Living   Hygiene/Grooming independent;prompts   Oral Hygiene independent   Dress independent   Room Organization independent     "

## 2018-12-14 NOTE — PROGRESS NOTES
Behavioral Health  Note   Behavioral Health  Spirituality Group Note     Unit 22    Name: Dannie Davis    YOB: 1995   MRN: [id number]    Age: 23 year old     Patient attended -led group, which included discussion of spirituality, coping with illness and building resilience.   Patient attended group for 1.0 hrs.   patient minimally participated, but was respectful to the group process.     Souravchristopher [facility]  Staff    Page [phone]

## 2018-12-14 NOTE — PROGRESS NOTES
"    ----------------------------------------------------------------------------------------------------------  [facility], [place]   Psychiatric Progress Note  Hospital Day #23     Interim History:   The patient's care was discussed with the treatment team and chart notes were reviewed.    Sleep: 5.5 hrs  Scheduled Medications: Received.  PRN Medications: Acetaminophen x1. Declined olanzapine x1 and trazodone x1.  Staff Report: Dannie was alternately anxious and paranoid during the day. She appeared fearful of the shower, appeared to be responding to internal stimuli, and cursed at staff. Repeatedly stated \"I want my baby.\" She was difficult to redirect when upset. Had a good visit with parents. Spent some time talking and laughing with peers and staff in the evening. Awoke overnight and asked for an \"I pa\" (?iPod) but became frustrated when staff did not understand her request. Instead listened to music, sang loudly, and became angry when asked to be quieter. Later returned to sleep.    Patient Interview: Ms. Davis was interviewed in her room. She is feeling \"fine\" today and feels yesterday was a \"good\" day. She slept \"good\" overnight and doesn't remember waking up. She became agitated and combative this morning after witnessing a rapid response called for another patient. She calmed down and started to singing to herself within about 15-20 minutes. When asked about this, she endorsed becoming upset because she didn't like seeing another patient become sick and wanted to pray. She was not scared. She endorses sometimes feeling happy and sometimes feeling sad. She doesn't want to be on the unit but acknowledges we are trying to help her and is agreeable to taking medication to help. She is concerned that the attending (Dr. Vann) is trying to control her - earlier this morning she expressed love for him. Does not feel that her father is controlling her. Denies hearing voices or " "seeing things that are not real. No headache currently, other pain, or dizziness upon standing.    The risks, benefits, alternatives and side effects of any medication changes have been discussed and are understood by the patient and other caregivers.    Review of Systems:   See HPI for pertinent ROS.          Allergies:     Allergies   Allergen Reactions     Septra [Sulfamethoxazole W/Trimethoprim]             Psychiatric Examination:   /88   Pulse 111   Temp 99.7  F (37.6  C) (Tympanic)   Resp 16   Ht 1.702 m (5' 7\")   Wt 59.6 kg (131 lb 8 oz)   SpO2 97%   BMI 20.60 kg/m    Weight is 131 lbs 8 oz  Body mass index is 20.6 kg/m .    Appearance: Awake, alert. Wearing hospital scrubs. Disheveled.  Attitude: Cooperative.  Eye Contact: Staring.  Mood: \"Fine.\"  Affect: Restricted.   Speech: Modestly increased speech latency, does not answer some questions. Some poverty of speech. Near normal rate and volume.   Psychomotor Behavior: No evidence of stereotypic movements, tardive dyskinesia, dystonia or tics.   Thought Process: Disorganized.  Associations: No loose associations.  Thought Content: Per staff report, appears to be responding to internal stimuli. Expresses concern that attending is controlling her. Denies AH or VH.   Insight: Poor.  Judgment: Poor.  Attention Span and Concentration: Limited.  Recent and Remote Memory: Poor recent memory. Remote memory ifficult to assess.  Language: Speaking fluent English with strong accent.  Fund of Knowledge: Difficult to assess.  Muscle Strength and Tone: Appearance is grossly normal.  Gait and Station: Slow gait. Normal station.     Labs:     No results found for this or any previous visit (from the past 24 hour(s)).     Assessment    Diagnostic Impression: Ms. Davis is a 23 y.o. F with a psychiatric history of MDD with psychotic features who presented to the [facility] ED via EMS on 11/21/2018 due to psychosis in the setting of medication non-adherence. Her primary " presentation is psychosis, as evidenced by reported and witnessed visual and auditory hallucinations including command hallucinations taking the form of her father, delusions that her father is trying to poison her, stereotypic hand movements, writhing leg movements, echopraxia, and delayed speech latency. Demonstrates emotional lability from calm baseline, with rapid transition to and from sobbing or dancing/singing/laughing. Sleep maintenance is poor and fluctuating. Given her prior diagnosis of MDD with psychosis and her current presentation with acute psychosis in the absence of depressive sxs, and disorganized thinking, a thought disorder seems to be the most likely pathology. Ddx includes schizoaffective disorder and less likely bipolar depression or MDD with psychotic features. Current symptoms, along with initial improvement with lorazepam suggest component of catatonia to her current unspecified condition.     Hospital Course: Ms. Davis was admitted to [facility] on a 72 hour hold. Treatment team filed for commitment and [employer] which have been received. Olanzapine was started and uptitrated. PRN hydroxyzine and trazodone were initiated. There was some concern for catatonia given stereotypic movements and poor olanzapine response so she was given lorazepam with marked clinical improvement. Scheduled lorazepam was started. Not in full remission with olanzapine so attempted transition to aripiprazole which she has previously taken with good reported response, but she had recurrence of delusions, increased lability, new hyperactivity/agitation, and worsening sleep maintenance, so she is transitioning back to olanzapine. After demonstrating poor symptom improvement, clozapine was started and uptitrated.     Medical Course: CMP, lipid panel, TSH, B12/folate, CBC, sed rate, lyme, treponema, HIV, EMERALD, ceruloplasmin, ECG WNL. Vitamin D low. UA non-infectious. Demonstrated orthostasis, attributable to up-titration  of olanzapine. Repeat CBC w/ diff WNL. Bowel regimen initiated for constipation.    Plan   Principal Diagnosis:   # Unspecified Schizophrenia Spectrum and Other Psychotic Disorder  # R/O Catatonia    Psychotropic Medications:  Modify:  - Increase clozapine to 100 mg at bedtime tonight and maintain this dose tomorrow, then increase to 125 mg on Sunday 12/16     Continue:  - Olanzapine 30 mg at bedtime   - Haldol 5 mg Q4H PRN (to use if daily olanzapine total exceeds 40mg)  - Diphenhydramine 50 mg Q4H PRN  - Lorazepam 2 mg at bedtime, 1 mg BID  - Cogentin 1mg BID PRN for akathisia/dystonia  - Olanzapine 10 mg Q2H PRN  - Hydroxyzine 25 mg Q4H PRN  - Trazodone 50 mg at bedtime PRN    Patient will be treated in therapeutic milieu with appropriate individual and group therapies as described.    Medical diagnoses to be addressed this admission:    # Vitamin D Deficiency  - Continue ergocalciferol 50,000 Units Q7 days    # Constipation  - Miralax 17 g daily  - Docusate 250 mg daily  - Mylanta/Maalox QID PRN    # Headache/Neck Pain   - Ibuprofen 400 mg Q6H PRN  - Acetaminophen PRN  - Menthol Topical Analgesic Q6H PRN    Data: As above.  Consults: None.    Legal Status: Commitment with [employer].    Safety Assessment:   Behavioral Orders   Procedures     Code 1 - Restrict to Unit     Elopement precautions     Routine Programming     As clinically indicated     Single Room     Status 15     Every 15 minutes.     Status Individual Observation     Order Specific Question:   CONTINUOUS 24 hours / day     Answer:   5 feet     Order Specific Question:   Indications for SIO     Answer:   Severe intrusiveness       Disposition: TBD pending clinical stabilization.     Kelli Aguilar, MS3    I was present with the medical student who participated in the service and in the  documentation of the note. I have verified the history and personally performed the  exam and medical decision making. I agree with the assessment and plan of  care as  documented in the note.    Naresh LrPatricia  December 14, 2018  [phone]      Attestation:  This patient has been seen and evaluated by me, Mauro Vann.  I have discussed this patient with the house staff team including the resident and medical student and I agree with the findings and plan in this note.    I have reviewed today's vital signs, medications, labs and imaging. Mauro Vann MD

## 2018-12-14 NOTE — PROGRESS NOTES
"   12/14/18 0500   Behavioral Health   Hallucinations appears responding   Thinking distractable;confused;poor concentration   Orientation situation, disoriented;time, disoriented;date, disoriented;place, disoriented;person, disoriented   Memory baseline memory   Insight poor   Judgement impaired   Eye Contact at examiner;staring   Affect irritable   Mood irritable;depressed;labile  (patient was irritable, was tearful at times)   Physical Appearance/Attire attire appropriate to age and situation   Hygiene well groomed   1. Wish to be Dead No   2. Non-Specific Active Suicidal Thoughts  No       Patient slept well until around 0200 when she awoke confused and irritable. She asked for an \"I pa\" but staff was unable to understand what she meant. She wrote down \"I pa\" but was irritated when staff was not able to understand her request. She returned to her room agitated and listened to music and sang loudly. When staff redirected her to keep her voice down, she said \"No\" and appeared angry. She continued to listen to music and pace/dance for awhile until falling back asleep.  "

## 2018-12-15 PROCEDURE — 12400003 ZZH R&B MH CRITICAL UMMC

## 2018-12-15 PROCEDURE — 25000132 ZZH RX MED GY IP 250 OP 250 PS 637: Performed by: STUDENT IN AN ORGANIZED HEALTH CARE EDUCATION/TRAINING PROGRAM

## 2018-12-15 PROCEDURE — G0177 OPPS/PHP; TRAIN & EDUC SERV: HCPCS

## 2018-12-15 PROCEDURE — 25000132 ZZH RX MED GY IP 250 OP 250 PS 637

## 2018-12-15 RX ADMIN — LORAZEPAM 1 MG: 1 TABLET ORAL at 14:01

## 2018-12-15 RX ADMIN — IBUPROFEN 400 MG: 200 TABLET, FILM COATED ORAL at 02:25

## 2018-12-15 RX ADMIN — Medication: at 19:11

## 2018-12-15 RX ADMIN — DIPHENHYDRAMINE HYDROCHLORIDE 50 MG: 25 CAPSULE ORAL at 20:00

## 2018-12-15 RX ADMIN — OLANZAPINE 10 MG: 10 TABLET, FILM COATED ORAL at 02:25

## 2018-12-15 RX ADMIN — CLOZAPINE 100 MG: 100 TABLET ORAL at 21:51

## 2018-12-15 RX ADMIN — TRAZODONE HYDROCHLORIDE 50 MG: 50 TABLET ORAL at 01:34

## 2018-12-15 RX ADMIN — LORAZEPAM 2 MG: 2 TABLET ORAL at 21:51

## 2018-12-15 RX ADMIN — OLANZAPINE 30 MG: 15 TABLET, FILM COATED ORAL at 21:51

## 2018-12-15 RX ADMIN — HYDROXYZINE HYDROCHLORIDE 50 MG: 25 TABLET ORAL at 01:34

## 2018-12-15 RX ADMIN — POLYETHYLENE GLYCOL 3350 17 G: 17 POWDER, FOR SOLUTION ORAL at 08:44

## 2018-12-15 RX ADMIN — HALOPERIDOL 5 MG: 5 TABLET ORAL at 20:00

## 2018-12-15 RX ADMIN — DOCUSATE SODIUM 250 MG: 250 CAPSULE, LIQUID FILLED ORAL at 08:44

## 2018-12-15 RX ADMIN — LORAZEPAM 1 MG: 1 TABLET ORAL at 08:44

## 2018-12-15 ASSESSMENT — ACTIVITIES OF DAILY LIVING (ADL)
HYGIENE/GROOMING: INDEPENDENT
DRESS: INDEPENDENT
ORAL_HYGIENE: INDEPENDENT
LAUNDRY: UNABLE TO COMPLETE

## 2018-12-15 NOTE — PROGRESS NOTES
12/15/18 1431   Behavioral Health   Hallucinations appears responding   Thinking poor concentration;distractable   Orientation person: oriented;date, disoriented   Insight poor   Judgement impaired   Eye Contact at examiner   Affect sad;blunted, flat   Mood anxious;mood is calm;hopeless   Physical Appearance/Attire disheveled   Hygiene neglected grooming - unclean body, hair, teeth   Suicidality other (see comments)  (Denies)   1. Wish to be Dead No   2. Non-Specific Active Suicidal Thoughts  No   Self Injury other (see comment)  (None observed or reported)   Elopement (None observed or reported)   Activity (visible)   Speech coherent   Psychomotor / Gait steady     Pt has been in and out of her room this shift. Affect is neutral to down/flat. Pt continues to have slurred and slowed speech, but is coherent. Observed to go to OT group, and participated in craft making. Pt also paced around unit, and watched some tv. Her goal was to clean herself, and she took a shower. At times pt starts to run in the freedman, but is redirectable. Denies thoughts to hurt herself or others. Contacts for safety.

## 2018-12-15 NOTE — PLAN OF CARE
"Pt started the shifts asleep and noted awake at 0130.  Pt becoming more restless the longer she was awake.  Noted jogging in place in her room at times.  Pt received PRN Hydroxyzine and PRN Trazodone.  Pt was sitting on edge of her bed eating a snack, starring straight ahead and stated, \"Stop what you are doing! Stop!\"  Appeared to be responding to internal stimuli.  Pt making random comments such as \"...want to kill me.  I don't want to die.\"  Followed by, \"My daughter will be here.  I want to know my story.\"  Pt crying at times, at one point stated \"I want food\" with a concepcion cracker in hand and tears running down her face.  Pt becoming increasingly restless and yelled out a couple of times.  Pt noted unsteady on her feet at times.  Pt reported h/a; ortho vitals obtained and pt received PRN Ibuprofen and PRN Zyprexa at 0225.  Pt appears to have slept approx 5 hrs this shift.  SIO 1:1 remains ongoing for safety.  Will continue to monitor and support.          "

## 2018-12-15 NOTE — PROGRESS NOTES
Presented with blunted affect. Engaged in multi-step creative expressions craft with Min A to initiate, gather materials, sequence and adjust to workspace demands as needed. Utilized writer demonstration for direction. Once independent with repetitive sequencing task, Pt demonstrated increased focus, planning, and problem solving. Able to ask for assistance as needed. Min-no social interaction with peers.

## 2018-12-15 NOTE — PLAN OF CARE
"Dannie had a difficult day R/T misunderstanding staff intervention during another client's crisis. She had been aggressive to staff knocking glucometer out of nurse's hand and spilling water. This evening, she is calmer, though still restless. She does not stay in one place for long. Stands on periphery of group. Stares. Asks in a confused way what is happening? Has not been shouting. She sometimes speaks to herself quietly, as if responding to internal stimuli.     21:15 Dannie's SIO staff had to redirect her from entering a male patient's room, J.B. She claimed that he was her \".\" She also stood right against a female peer who was taking her medications at the medication window. Continues with poor boundaries.   "

## 2018-12-16 PROCEDURE — 12400003 ZZH R&B MH CRITICAL UMMC

## 2018-12-16 PROCEDURE — 25000132 ZZH RX MED GY IP 250 OP 250 PS 637: Performed by: STUDENT IN AN ORGANIZED HEALTH CARE EDUCATION/TRAINING PROGRAM

## 2018-12-16 PROCEDURE — 25000132 ZZH RX MED GY IP 250 OP 250 PS 637

## 2018-12-16 RX ADMIN — LORAZEPAM 1 MG: 1 TABLET ORAL at 09:57

## 2018-12-16 RX ADMIN — LORAZEPAM 1 MG: 1 TABLET ORAL at 13:41

## 2018-12-16 RX ADMIN — DOCUSATE SODIUM 250 MG: 250 CAPSULE, LIQUID FILLED ORAL at 09:57

## 2018-12-16 RX ADMIN — CLOZAPINE 125 MG: 100 TABLET ORAL at 21:44

## 2018-12-16 RX ADMIN — HYDROXYZINE HYDROCHLORIDE 50 MG: 25 TABLET ORAL at 03:24

## 2018-12-16 RX ADMIN — POLYETHYLENE GLYCOL 3350 17 G: 17 POWDER, FOR SOLUTION ORAL at 09:58

## 2018-12-16 RX ADMIN — OLANZAPINE 30 MG: 15 TABLET, FILM COATED ORAL at 21:44

## 2018-12-16 RX ADMIN — LORAZEPAM 2 MG: 2 TABLET ORAL at 21:45

## 2018-12-16 RX ADMIN — TRAZODONE HYDROCHLORIDE 50 MG: 50 TABLET ORAL at 03:24

## 2018-12-16 ASSESSMENT — ACTIVITIES OF DAILY LIVING (ADL)
ORAL_HYGIENE: INDEPENDENT
LAUNDRY: WITH SUPERVISION
HYGIENE/GROOMING: INDEPENDENT
ORAL_HYGIENE: INDEPENDENT
DRESS: INDEPENDENT
LAUNDRY: WITH SUPERVISION
DRESS: INDEPENDENT
HYGIENE/GROOMING: INDEPENDENT

## 2018-12-16 ASSESSMENT — MIFFLIN-ST. JEOR: SCORE: 1376.84

## 2018-12-16 NOTE — PROGRESS NOTES
Pt remains restless and disorganized in the milieu, needing redirection at times from staff (running up and down halls, needing to give other patients personal space). Parents visited this evening.        12/15/18 2142   Behavioral Health   Hallucinations appears responding   Thinking paranoid;poor concentration;distractable   Orientation person: oriented;place: oriented   Memory baseline memory   Insight poor   Judgement impaired   Eye Contact at examiner;staring   Affect blunted, flat   Mood labile;anxious   Physical Appearance/Attire disheveled   Hygiene well groomed   Suicidality other (see comments)  (none stated)   1. Wish to be Dead No   2. Non-Specific Active Suicidal Thoughts  No   Self Injury other (see comment)  (none )   Elopement (none)   Activity restless;hyperactive (agitated, impulsive);withdrawn   Speech coherent  (slow, one word sentences)   Psychomotor / Gait balanced;steady   Psycho Education   Type of Intervention 1:1 intervention   Response participates with cues/redirection   Hours 0.5   Treatment Detail check in   Activities of Daily Living   Hygiene/Grooming independent   Oral Hygiene independent   Dress independent   Laundry unable to complete   Room Organization independent

## 2018-12-16 NOTE — PROGRESS NOTES
"Pt appears to have slept 4.5 hrs this shift.  Upon wakening during the night pt tearful at times.  Pt unable to articulate why she was sad/crying.  Pt received PRN Hydroxyzine & PRN Trazodone at 0324.  Pt encouraged to sleep after PRN's however stated \"I don't want to sleep. I'm refusing! Call the police!\"  Pt did eventually fall back asleep.  Pt appeared to be responding to internal stimuli--would be in her bed staring straight ahead, no one talking to her and state \"Leave me alone!\"--which was similar to last night.  Pt had incident of going into the lounge and getting very close to another pt and staring intently at her, demonstrating poor boundaries.  Pt then upset when the other pt screamed (as other pt was scared).  Pt stated, \"I don't want her hollering at me again!\"  Pt encouraged to move away from peer and was compliant with this however continued intently staring at peer until she finally went back in to her room.  SIO 1:1 remains ongoing for safety.  Will continue to monitor and support.    ADDENDUM:  Later in morning pt noted lying in bed hysterically laughing to self, this went on for awhile on and off.    "

## 2018-12-16 NOTE — PLAN OF CARE
"Dannie continues on SIO to prevent intrusive behaviors with peers-required freq redirection to prevent touching of male peer, attempted to sit on peer's lap-Friday petros angrily accused this writer of being jealous of her because she has  (above peer) and staff does not-at times stands directly over peer when he is in lounge, staring intensely and requiring couple staff to redirect her-early afternoon Dannie running up and down freedman holding breasts in her hands-when redirected by staff, angrily pushed him and accused him of planning to \"fuck me in the ass\"-made this statement to several staff in the freedman-Dannie able to hear conversation of support and reassurance-went to room to rest post 1400 Ativan given-2200 Dannie calmer this evening, but does continue to require SIO to prevent her from going into peer's rooms, hannah male peers-continues to be focused on \"getting a \"-continued blunted affect with intense stare that others often find intimidating-sleeping off and on this shift-  "

## 2018-12-17 LAB
BASOPHILS # BLD AUTO: 0 10E9/L (ref 0–0.2)
BASOPHILS NFR BLD AUTO: 0.2 %
DIFFERENTIAL METHOD BLD: NORMAL
EOSINOPHIL # BLD AUTO: 0.2 10E9/L (ref 0–0.7)
EOSINOPHIL NFR BLD AUTO: 4.7 %
ERYTHROCYTE [DISTWIDTH] IN BLOOD BY AUTOMATED COUNT: 13.2 % (ref 10–15)
HCT VFR BLD AUTO: 36.1 % (ref 35–47)
HGB BLD-MCNC: 12.2 G/DL (ref 11.7–15.7)
IMM GRANULOCYTES # BLD: 0 10E9/L (ref 0–0.4)
IMM GRANULOCYTES NFR BLD: 0.2 %
LYMPHOCYTES # BLD AUTO: 1.6 10E9/L (ref 0.8–5.3)
LYMPHOCYTES NFR BLD AUTO: 32.9 %
MCH RBC QN AUTO: 27 PG (ref 26.5–33)
MCHC RBC AUTO-ENTMCNC: 33.8 G/DL (ref 31.5–36.5)
MCV RBC AUTO: 80 FL (ref 78–100)
MONOCYTES # BLD AUTO: 0.2 10E9/L (ref 0–1.3)
MONOCYTES NFR BLD AUTO: 4.3 %
NEUTROPHILS # BLD AUTO: 2.9 10E9/L (ref 1.6–8.3)
NEUTROPHILS NFR BLD AUTO: 57.7 %
NRBC # BLD AUTO: 0 10*3/UL
NRBC BLD AUTO-RTO: 0 /100
PLATELET # BLD AUTO: 266 10E9/L (ref 150–450)
RBC # BLD AUTO: 4.52 10E12/L (ref 3.8–5.2)
WBC # BLD AUTO: 4.9 10E9/L (ref 4–11)

## 2018-12-17 PROCEDURE — 25000132 ZZH RX MED GY IP 250 OP 250 PS 637: Performed by: STUDENT IN AN ORGANIZED HEALTH CARE EDUCATION/TRAINING PROGRAM

## 2018-12-17 PROCEDURE — 99232 SBSQ HOSP IP/OBS MODERATE 35: CPT | Mod: GC | Performed by: PSYCHIATRY & NEUROLOGY

## 2018-12-17 PROCEDURE — 85025 COMPLETE CBC W/AUTO DIFF WBC: CPT | Performed by: STUDENT IN AN ORGANIZED HEALTH CARE EDUCATION/TRAINING PROGRAM

## 2018-12-17 PROCEDURE — 12400003 ZZH R&B MH CRITICAL UMMC

## 2018-12-17 PROCEDURE — 25000132 ZZH RX MED GY IP 250 OP 250 PS 637: Performed by: PSYCHIATRY & NEUROLOGY

## 2018-12-17 PROCEDURE — 36415 COLL VENOUS BLD VENIPUNCTURE: CPT | Performed by: STUDENT IN AN ORGANIZED HEALTH CARE EDUCATION/TRAINING PROGRAM

## 2018-12-17 RX ORDER — IPRATROPIUM BROMIDE 21 UG/1
2 SPRAY, METERED NASAL AT BEDTIME
Status: DISCONTINUED | OUTPATIENT
Start: 2018-12-17 | End: 2018-12-18

## 2018-12-17 RX ORDER — OLANZAPINE 10 MG/1
20 TABLET ORAL AT BEDTIME
Status: DISCONTINUED | OUTPATIENT
Start: 2018-12-17 | End: 2018-12-21

## 2018-12-17 RX ADMIN — POLYETHYLENE GLYCOL 3350 17 G: 17 POWDER, FOR SOLUTION ORAL at 09:46

## 2018-12-17 RX ADMIN — OLANZAPINE 20 MG: 10 TABLET, FILM COATED ORAL at 21:55

## 2018-12-17 RX ADMIN — LORAZEPAM 2 MG: 2 TABLET ORAL at 21:55

## 2018-12-17 RX ADMIN — CLOZAPINE 150 MG: 100 TABLET ORAL at 22:27

## 2018-12-17 RX ADMIN — Medication: at 11:02

## 2018-12-17 RX ADMIN — ERGOCALCIFEROL 50000 UNITS: 1.25 CAPSULE ORAL at 18:43

## 2018-12-17 RX ADMIN — HALOPERIDOL 5 MG: 5 TABLET ORAL at 12:09

## 2018-12-17 RX ADMIN — LORAZEPAM 1 MG: 1 TABLET ORAL at 09:46

## 2018-12-17 RX ADMIN — IPRATROPIUM BROMIDE 2 SPRAY: 21 SPRAY NASAL at 21:55

## 2018-12-17 RX ADMIN — DOCUSATE SODIUM 250 MG: 250 CAPSULE, LIQUID FILLED ORAL at 09:47

## 2018-12-17 RX ADMIN — LORAZEPAM 1 MG: 1 TABLET ORAL at 15:01

## 2018-12-17 ASSESSMENT — ACTIVITIES OF DAILY LIVING (ADL)
LAUNDRY: WITH SUPERVISION
DRESS: INDEPENDENT
LAUNDRY: WITH SUPERVISION
ORAL_HYGIENE: PROMPTS
DRESS: INDEPENDENT
HYGIENE/GROOMING: PROMPTS
HYGIENE/GROOMING: SHOWER;PROMPTS
ORAL_HYGIENE: PROMPTS

## 2018-12-17 NOTE — PROGRESS NOTES
Observed to have slept well overnight for approx 6.75hrs. w/o complaints or noted distress.  Respirations regular and unlabored w/ intermittent snoring early shift.  Excessive drooling observed throughout the night.  Pillowcase changed as appropriate;  Towels maintained over pillow.  Hygienic measured as pt would allow encouraged w/ assist upon awakening.   Pt. wants to be independent yet is quite neglectful of her hygienic needs.  Somewhat tearful this morning yet can be easily distracted.  Tolerating po fluids well upon awakening.  Mood labile - from tearfulness to singing.  Kicking her legs, hold her breasts and dancing in room  at this time.  Question stiffness of the lower extremities as pt. Refuses to answer when questioned re: same.  Will monitor.  Safe, therapeutic environment maintained.

## 2018-12-17 NOTE — PROGRESS NOTES
"Pt was irritable and tense, required prompting to not follow other pt's and enter their rooms. She also required redirection to prevent touching other pt and staff, in the morning grabbed her physicians arm. Pt would stare blankly at staff and not communicate back. She would run out of her room yelling, repeatedly would spell a pt's name loudly throughout the day. Pt reports auditory hallucinations that are punishing and controlling her. Pt was observed yelling in the shower, \"call the police\". Pt denies SI/SIB and medication side affects. Pt was visited by her parents today who brought her food, she stated the visit did not go well.        12/17/18 5125   Behavioral Health   Hallucinations appears responding;auditory;visual   Thinking confused;distractable;delusional;paranoid   Orientation person: oriented;place: oriented   Memory baseline memory   Insight denial of illness   Judgement impaired   Eye Contact at examiner   Affect blunted, flat;tense;irritable   Mood labile   Physical Appearance/Attire disheveled   Hygiene other (see comment)  (adequate)   Suicidality other (see comments)  (denies)   1. Wish to be Dead No   2. Non-Specific Active Suicidal Thoughts  No   Self Injury other (see comment)  (denies)   Elopement Statements about wanting to leave   Activity hyperactive (agitated, impulsive);restless   Speech coherent   Medication Sensitivity no stated side effects;no observed side effects   Psychomotor / Gait balanced;steady   Psycho Education   Type of Intervention 1:1 intervention   Response participates with cues/redirection   Activities of Daily Living   Hygiene/Grooming shower;prompts   Oral Hygiene prompts   Dress independent   Laundry with supervision   Room Organization independent     "

## 2018-12-17 NOTE — PROGRESS NOTES
"    ----------------------------------------------------------------------------------------------------------  [facility], [place]   Psychiatric Progress Note  Hospital Day #26     Interim History:   The patient's care was discussed with the treatment team and chart notes were reviewed.    Sleep: 6.75 hrs  Scheduled Medications: Received.  PRN Medications: None in last 24 hours, used PRN haldol, olanzapine, hydroxyzine, benadryl over weekend  Staff Report: Dannie continued to be emotionally labile throughout the weekend - alternatively crying, laughing hysterically, or becoming irritable. She spent much time pacing the hallways and sometimes breaking into a run. She demonstrated poor boundaries, most notably breaking into a run and briefly touching a male patient's backside. Was often difficult to redirect. Overnight RN noted copious sialorrhea requiring that bedsheets be changed as well as concern for questionable LE stiffness.    Patient Interview: Ms. Davis was interviewed in her room. She became agitated and started yelling just prior to our interview so was offered haldol, which she accepted. When asked what was wrong, she repeated to the attending physician that she loves him.     The risks, benefits, alternatives and side effects of any medication changes have been discussed and are understood by the patient and other caregivers.    Review of Systems:   See HPI for pertinent ROS.          Allergies:     Allergies   Allergen Reactions     Septra [Sulfamethoxazole W/Trimethoprim]             Psychiatric Examination:   /64 (BP Location: Left arm)   Pulse 123   Temp 98.7  F (37.1  C) (Tympanic)   Resp 17   Ht 1.702 m (5' 7\")   Wt 59.4 kg (131 lb)   SpO2 99%   BMI 20.52 kg/m    Weight is 131 lbs 0 oz  Body mass index is 20.52 kg/m .    Appearance: Awake, alert. Wearing hospital scrubs. Disheveled.  Attitude: Upset but cooperative.  Eye Contact: Staring.  Mood: Not " assessed.  Affect: Labile.  Speech: Modestly increased speech latency, does not answer some questions. Some poverty of speech. Near normal rate and volume.   Psychomotor Behavior: Slow but fluid movement. No evidence of stereotypic movements, tardive dyskinesia, dystonia or tics. No tremor.   Thought Process: Disorganized.  Associations: No loose associations.  Thought Content: Erotomanic delusion focused on attending physician.  Insight: Poor.  Judgment: Poor.  Attention Span and Concentration: Limited.  Recent and Remote Memory: Difficult to assess.  Language: Speaking fluent English with strong accent.  Fund of Knowledge: Difficult to assess.  Muscle Strength and Tone: Appearance is grossly normal, not obviously stiff  Gait and Station: Slow gait. Normal station.     Labs:     Recent Results (from the past 24 hour(s))   CBC with platelets differential    Collection Time: 12/17/18  7:39 AM   Result Value Ref Range    WBC 4.9 4.0 - 11.0 10e9/L    RBC Count 4.52 3.8 - 5.2 10e12/L    Hemoglobin 12.2 11.7 - 15.7 g/dL    Hematocrit 36.1 35.0 - 47.0 %    MCV 80 78 - 100 fl    MCH 27.0 26.5 - 33.0 pg    MCHC 33.8 31.5 - 36.5 g/dL    RDW 13.2 10.0 - 15.0 %    Platelet Count 266 150 - 450 10e9/L    Diff Method Automated Method     % Neutrophils 57.7 %    % Lymphocytes 32.9 %    % Monocytes 4.3 %    % Eosinophils 4.7 %    % Basophils 0.2 %    % Immature Granulocytes 0.2 %    Nucleated RBCs 0 0 /100    Absolute Neutrophil 2.9 1.6 - 8.3 10e9/L    Absolute Lymphocytes 1.6 0.8 - 5.3 10e9/L    Absolute Monocytes 0.2 0.0 - 1.3 10e9/L    Absolute Eosinophils 0.2 0.0 - 0.7 10e9/L    Absolute Basophils 0.0 0.0 - 0.2 10e9/L    Abs Immature Granulocytes 0.0 0 - 0.4 10e9/L    Absolute Nucleated RBC 0.0         Assessment    Diagnostic Impression: Ms. Davis is a 23 y.o. F with a psychiatric history of MDD with psychotic features who presented to the [facility] ED via EMS on 11/21/2018 due to psychosis in the setting of medication  non-adherence. Her primary presentation is psychosis, as evidenced by reported and witnessed visual and auditory hallucinations including command hallucinations taking the form of her father, delusions that her father is trying to poison her, stereotypic hand movements, writhing leg movements, echopraxia, and delayed speech latency. Demonstrates emotional lability from calm baseline, with rapid transition to and from sobbing or dancing/singing/laughing. Sleep maintenance is poor and fluctuating. Given her prior diagnosis of MDD with psychosis and her current presentation with acute psychosis in the absence of depressive sxs, and disorganized thinking, a thought disorder seems to be the most likely pathology. Ddx includes schizoaffective disorder and less likely bipolar depression or MDD with psychotic features. Current symptoms, along with initial improvement with lorazepam suggest component of catatonia to her current unspecified condition.     Hospital Course: Ms. Davis was admitted to [facility] on a 72 hour hold. Treatment team filed for commitment and [place] which have been received. Olanzapine was started and uptitrated. PRN hydroxyzine and trazodone were initiated. There was some concern for catatonia given stereotypic movements and poor olanzapine response so she was given lorazepam with marked clinical improvement. Scheduled lorazepam was started. Not in full remission with olanzapine so attempted transition to aripiprazole which she has previously taken with good reported response, but she had recurrence of delusions, increased lability, new hyperactivity/agitation, and worsening sleep maintenance, so she is transitioning back to olanzapine. After demonstrating poor symptom improvement, clozapine was started and titrated while olanzapine was tapered.     Medical Course: CMP, lipid panel, TSH, B12/folate, CBC, sed rate, lyme, treponema, HIV, EMERALD, ceruloplasmin, ECG WNL. Vitamin D low. UA non-infectious.  Demonstrated orthostasis, attributable to up-titration of olanzapine. Repeat CBC w/ diff WNL. Bowel regimen initiated for constipation.     Plan   Principal Diagnosis:   # Unspecified Schizophrenia Spectrum and Other Psychotic Disorder  # R/O Catatonia    Psychotropic Medications:  Modify:  - Increase clozapine to 150 mg daily  - Decrease olanzapine to 20 mg at bedtime  - Initiate Ipratropium spray sublingual for siallorrhea    Continue:  - Haldol 5 mg Q4H PRN (to use if daily olanzapine total exceeds 40mg)  - Diphenhydramine 50 mg Q4H PRN  - Lorazepam 2 mg at bedtime, 1 mg BID  - Cogentin 1 mg BID PRN for akathisia/dystonia  - Olanzapine 10 mg Q2H PRN  - Hydroxyzine 25 mg Q4H PRN   - Trazodone 50 mg at bedtime PRN    Patient will be treated in therapeutic milieu with appropriate individual and group therapies as described.    Medical diagnoses to be addressed this admission:    # Vitamin D Deficiency  - Continue ergocalciferol 50,000 Units Q7 days    # Constipation  - Miralax 17 g daily  - Docusate 250 mg daily  - Mylanta/Maalox QID PRN    # Headache/Neck Pain   - Ibuprofen 400 mg Q6H PRN  - Acetaminophen PRN  - Menthol Topical Analgesic Q6H PRN    #Sialorrhea  Secondary to clozapine.  - Start ipratropium spray sublingual      Data: As above.  Consults: None.    Legal Status: Commitment with [employer].    Safety Assessment:   Behavioral Orders   Procedures     Code 1 - Restrict to Unit     Elopement precautions     Fall precautions     Routine Programming     As clinically indicated     Single Room     Status 15     Every 15 minutes.     Status Individual Observation     Order Specific Question:   CONTINUOUS 24 hours / day     Answer:   5 feet     Order Specific Question:   Indications for SIO     Answer:   Severe intrusiveness       Disposition: TBD pending clinical stabilization and medication optimization.     Kelli Aguilar, MS3    I was present with the medical student who participated in the service and in  the  documentation of the note. I have verified the history and personally performed the  exam and medical decision making. I agree with the assessment and plan of  care as documented in the note.    Naresh Manriquez  December 17, 2018  [phone]        Attestation:  This patient has been seen and evaluated by me, Mauro Vann.  I have discussed this patient with the house staff team including the resident and medical student and I agree with the findings and plan in this note.    I have reviewed today's vital signs, medications, labs and imaging. Mauro Vann MD

## 2018-12-17 NOTE — PROGRESS NOTES
During day shift, pt was pacing in the hallway with 1:1. Next pt started to run towards another male pt as he was entering his room. Pt had her arms out and made contact with male pt's backside briefly before writer was able to separate pt. During re-direction, pt tried to push writer, and was able to come out of other pt's room. At first, pt became loud and was resistant in the hallway to re-direction. Per staff's encouragement, pt was able to walk herself to her room. She was calm soon after, and was observed resting on her bed.

## 2018-12-18 LAB
ALBUMIN SERPL-MCNC: 3.7 G/DL (ref 3.4–5)
ALP SERPL-CCNC: 78 U/L (ref 40–150)
ALT SERPL W P-5'-P-CCNC: 22 U/L (ref 0–50)
ANION GAP SERPL CALCULATED.3IONS-SCNC: 6 MMOL/L (ref 3–14)
AST SERPL W P-5'-P-CCNC: 16 U/L (ref 0–45)
BILIRUB SERPL-MCNC: 0.2 MG/DL (ref 0.2–1.3)
BUN SERPL-MCNC: 13 MG/DL (ref 7–30)
CALCIUM SERPL-MCNC: 9.2 MG/DL (ref 8.5–10.1)
CHLORIDE SERPL-SCNC: 104 MMOL/L (ref 94–109)
CO2 SERPL-SCNC: 28 MMOL/L (ref 20–32)
CREAT SERPL-MCNC: 0.73 MG/DL (ref 0.52–1.04)
GFR SERPL CREATININE-BSD FRML MDRD: >90 ML/MIN/1.7M2
GLUCOSE SERPL-MCNC: 107 MG/DL (ref 70–99)
POTASSIUM SERPL-SCNC: 3.8 MMOL/L (ref 3.4–5.3)
PROT SERPL-MCNC: 8.6 G/DL (ref 6.8–8.8)
SODIUM SERPL-SCNC: 138 MMOL/L (ref 133–144)

## 2018-12-18 PROCEDURE — 25000132 ZZH RX MED GY IP 250 OP 250 PS 637: Performed by: STUDENT IN AN ORGANIZED HEALTH CARE EDUCATION/TRAINING PROGRAM

## 2018-12-18 PROCEDURE — 25000132 ZZH RX MED GY IP 250 OP 250 PS 637

## 2018-12-18 PROCEDURE — 36415 COLL VENOUS BLD VENIPUNCTURE: CPT | Performed by: STUDENT IN AN ORGANIZED HEALTH CARE EDUCATION/TRAINING PROGRAM

## 2018-12-18 PROCEDURE — 12400003 ZZH R&B MH CRITICAL UMMC

## 2018-12-18 PROCEDURE — 80053 COMPREHEN METABOLIC PANEL: CPT | Performed by: STUDENT IN AN ORGANIZED HEALTH CARE EDUCATION/TRAINING PROGRAM

## 2018-12-18 PROCEDURE — 99232 SBSQ HOSP IP/OBS MODERATE 35: CPT | Mod: GC | Performed by: PSYCHIATRY & NEUROLOGY

## 2018-12-18 RX ORDER — IPRATROPIUM BROMIDE 21 UG/1
2 SPRAY, METERED NASAL AT BEDTIME
Status: DISCONTINUED | OUTPATIENT
Start: 2018-12-18 | End: 2018-12-27

## 2018-12-18 RX ORDER — TRAZODONE HYDROCHLORIDE 100 MG/1
100 TABLET ORAL ONCE
Status: DISCONTINUED | OUTPATIENT
Start: 2018-12-18 | End: 2018-12-21

## 2018-12-18 RX ADMIN — HALOPERIDOL 5 MG: 5 TABLET ORAL at 11:14

## 2018-12-18 RX ADMIN — TRAZODONE HYDROCHLORIDE 50 MG: 50 TABLET ORAL at 00:11

## 2018-12-18 RX ADMIN — Medication: at 10:35

## 2018-12-18 RX ADMIN — OLANZAPINE 20 MG: 10 TABLET, FILM COATED ORAL at 20:46

## 2018-12-18 RX ADMIN — IPRATROPIUM BROMIDE 2 SPRAY: 21 SPRAY, METERED NASAL at 21:07

## 2018-12-18 RX ADMIN — BENZTROPINE MESYLATE 1 MG: 1 TABLET ORAL at 00:03

## 2018-12-18 RX ADMIN — POLYETHYLENE GLYCOL 3350 17 G: 17 POWDER, FOR SOLUTION ORAL at 09:47

## 2018-12-18 RX ADMIN — CLOZAPINE 187.5 MG: 100 TABLET ORAL at 20:47

## 2018-12-18 RX ADMIN — DOCUSATE SODIUM 250 MG: 250 CAPSULE, LIQUID FILLED ORAL at 09:52

## 2018-12-18 RX ADMIN — LORAZEPAM 2 MG: 2 TABLET ORAL at 20:46

## 2018-12-18 RX ADMIN — LORAZEPAM 1 MG: 1 TABLET ORAL at 13:59

## 2018-12-18 RX ADMIN — OLANZAPINE 10 MG: 10 TABLET, FILM COATED ORAL at 06:18

## 2018-12-18 RX ADMIN — TRAZODONE HYDROCHLORIDE 50 MG: 50 TABLET ORAL at 00:03

## 2018-12-18 RX ADMIN — LORAZEPAM 1 MG: 1 TABLET ORAL at 09:51

## 2018-12-18 ASSESSMENT — ACTIVITIES OF DAILY LIVING (ADL)
LAUNDRY: WITH SUPERVISION
ORAL_HYGIENE: PROMPTS
HYGIENE/GROOMING: PROMPTS;INDEPENDENT
DRESS: SCRUBS (BEHAVIORAL HEALTH);PROMPTS
ORAL_HYGIENE: PROMPTS;INDEPENDENT
HYGIENE/GROOMING: PROMPTS
DRESS: INDEPENDENT;SCRUBS (BEHAVIORAL HEALTH)

## 2018-12-18 NOTE — PROGRESS NOTES
"Rec'd pt. sleeping w/ regular non-labored respirations as shift commenced. SIO continues for safety  r/t pt's disorganization, emotionally labile behaviors and very poor boundaries w/ severe intrusiveness hannah. Toward select  peers.  Awake before MN; slowly out of bed c/o \"legs hurt\".  Began to kick her legs while sitting on bedside then got up and began to jump up and down, did jumping jacks   while holding her breast.  Labile w/intermittent irritability.  Refused to use leg cream as ordered for discomfort.  Willing to take Cogentin and Trazadone.  Drinking excessive amounts of fluids - 3 cups water and 2 containers  ensure &  having saltines.  While pacing freedman broke out in to a run down the freedman accepting no directives from staff.  Back to [facility] area becoming distracted by a peer (who sleeps in the lounge due to her refusal to have a roommate).  This roommate chooses to have her lounge chair directly across from pt's room and frequently chants her prayers louder & louder in a seemingly intentional attempt to further agitate Luisa who begins to sing loudly each staring directly at the other  requiring much intervention by several staff and relocation of peer's lounger. Luisa glares at staff in annoyance and calls this staff a \"bitch\" repeatedly.  When calmer, assisted w/measures conducive to sleep to which pt. Said \"I don't want you to work for me, thank you\".  Zyperexa encouraged, offered and refused x's 3.  Eventually was able to lie down and fell asleep at approx 0130. Towels over pillow for drooling.  Monitoring continually.   "

## 2018-12-18 NOTE — PROGRESS NOTES
12/18/18 1407   Behavioral Health   Hallucinations appears responding   Thinking confused;distractable;delusional;poor concentration   Orientation person: oriented;place: oriented   Memory baseline memory   Insight poor;denial of illness   Judgement impaired   Eye Contact at examiner;staring   Affect blunted, flat   Mood irritable   Physical Appearance/Attire appears stated age   Hygiene neglected grooming - unclean body, hair, teeth   Suicidality other (see comments)  (SALAS)   Self Injury other (see comment)   Elopement Hypervigilance to activities on and off the unit;Loitering near exit doors   Activity hyperactive (agitated, impulsive);withdrawn;restless   Speech coherent   Medication Sensitivity no observed side effects;no stated side effects   Psychomotor / Gait balanced;steady   Activities of Daily Living   Hygiene/Grooming prompts   Oral Hygiene prompts   Dress scrubs (behavioral health);prompts   Room Organization prompts     When writer attempted to check in with pt she blankly stare at writer. Pt sat next to male peer and after 15 minutes   she place her arm over peers arm. Pt was prompted to eat breakfast several times, she declined. Pt was also prompted   to brush her teeth and she declined as well.

## 2018-12-18 NOTE — PROGRESS NOTES
"Wakeful again at approx 0540. Quite irritable w/ agitation upon awakening.  Scrub top saturated from excessive salivation  Drool flowing from mouth.  Adamantly refusing any self or assisted hygienic measures.  Blowing nose aggressively r/t some nasal congestion and refuses to listen when requested to blow nose less aggressively repeatedly calling nurse a \"witch\".  Raised voiced and insisted \"I will not take no medication\". Immediately wanting po fluids and adamantly reusing any medication. With time, much encouragement and patience pt. Calmed, took Zyprexa as ordered and was able to provide self hygienic measures though less than adequate;  Did change her scrubs.  Calmer after taking the Zyprexa even before med became effective.  After linen change was able to lie down w/ intermittent talking to herself.  Apologized to this staff x's 2. - \"I'm sorry I was so rude to you tonight\".  Acknowledged appropriately by staff.  SIO monitoring continues.  Safe, therapeutic environment  maintained.  Slept for a total of approx 4.5hrs overnight.   "

## 2018-12-18 NOTE — PROGRESS NOTES
"Pt hyperactive, running in the halls. She appeared paranoid of staff and other pt's. Pt stated another pt was her family, she attempted to approach this pt when they were meeting with a visitor. When staff asked her where she was going she stated \"I'm going to make love\", as she tried to move toward that pt. Pt required redirection in this instance and others. Pt was difficult to redirect at times, tired to force her way towards a Pt's visitor who had a child with them. Pt would think that staff and pt were making negative statement about her, she would yell \"No I am not stealing\", \"No I will not cut my hair\".     12/17/18 2136   Behavioral Health   Hallucinations appears responding;auditory;visual   Thinking confused;distractable;paranoid;delusional;poor concentration   Orientation person: oriented;place: oriented   Memory baseline memory   Insight poor;denial of illness   Judgement impaired   Eye Contact at examiner;staring   Affect tense;blunted, flat   Mood anxious;irritable   Physical Appearance/Attire attire appropriate to age and situation   Hygiene well groomed   Suicidality other (see comments)  (denies)   1. Wish to be Dead No   2. Non-Specific Active Suicidal Thoughts  No   Self Injury other (see comment)  (denies)   Elopement Hallucinations directing behavior;Statements about wanting to leave   Activity hyperactive (agitated, impulsive)   Speech coherent   Medication Sensitivity no stated side effects;no observed side effects   Psychomotor / Gait balanced;steady   Activities of Daily Living   Hygiene/Grooming prompts   Oral Hygiene prompts   Dress independent   Laundry with supervision   Room Organization independent     "

## 2018-12-18 NOTE — PROGRESS NOTES
"    ----------------------------------------------------------------------------------------------------------  [facility], [place]   Psychiatric Progress Note  Hospital Day #27     Interim History:   The patient's care was discussed with the treatment team and chart notes were reviewed.    Sleep: 4.5 hrs  Scheduled Medications: Received.  PRN Medications: Haldol x1, benztropine x1, olanzapine x1, trazodone x2, topical menthol x1.  Staff Report: Dannie has been tense, irritable and hyperactive. She periodically ran out of her room yelling in the morning and ran up and down the halls later in the day. Is quite intrusive with select peers and staff, sometimes trying to touch them. Approached another patient thinking they were a family member. When asked what she was doing stated \"I'm going to make love.\" Paranoid regarding staff and peers. Having auditory hallucinations which are punishing and controlling her. C/o leg pain overnight and began to kick her legs and doing jumping jacks. Continuing to have excessive drooling now saturating her scrub top and resistant to hygiene measures. Had a visit with her parents which went poorly.     Patient Interview: Ms. Davis was interviewed in her room. She was initially reclining in bed but sat up to be interviewed. She is feeling \"fine\" today and has a \"good\" day on the unit yesterday. She doesn't feel safe on the unit and attributes this to feeling like she is not getting her sleep medication on time. When asked what we can help her with, she states \"I want to get .\" Her legs are restless and she feels the need to move them frequently. No leg pain.    The risks, benefits, alternatives and side effects of any medication changes have been discussed and are understood by the patient and other caregivers.    Review of Systems:   See HPI for pertinent ROS.          Allergies:     Allergies   Allergen Reactions     Septra [Sulfamethoxazole " "W/Trimethoprim]             Psychiatric Examination:   /88   Pulse 122   Temp 98.4  F (36.9  C) (Oral)   Resp 18   Ht 1.702 m (5' 7\")   Wt 59.4 kg (131 lb)   SpO2 99%   BMI 20.52 kg/m    Weight is 131 lbs 0 oz  Body mass index is 20.52 kg/m .    Appearance: Awake, alert. Wearing hospital scrubs. Disheveled.  Attitude: Cooperative.  Eye Contact: Staring.  Mood: \"Fine.\"  Affect: Constricted with modest lability.  Speech: Near normal speech latency. Some poverty of speech. Near normal rate and volume.   Psychomotor Behavior: Slow but fluid movement. No evidence of stereotypic movements, tardive dyskinesia, dystonia or tics. No tremor.   Thought Process: Illogical but improving, goal directed at times  Associations: No loose associations.  Thought Content: Erotomanic delusion focused on attending physician.  Insight: Poor.  Judgment: Poor.  Attention Span and Concentration: Limited.  Recent and Remote Memory: Difficult to assess.  Language: Speaking fluent English with strong accent.  Fund of Knowledge: Difficult to assess.  Muscle Strength and Tone: Appearance is grossly normal, not obviously stiff.  Gait and Station: Normal gait and station.     Labs:     No results found for this or any previous visit (from the past 24 hour(s)).     Assessment    Diagnostic Impression: Ms. Davis is a 23 y.o. F with a psychiatric history of MDD with psychotic features who presented [facility] the [facility] ED via EMS on 11/21/2018 due to psychosis in the setting of medication non-adherence. Her primary presentation is psychosis, as evidenced by reported and witnessed visual and auditory hallucinations including command hallucinations taking the form of her father, delusions that her father is trying to poison her, stereotypic hand movements, writhing leg movements, echopraxia, and delayed speech latency. Demonstrates emotional lability from calm baseline, with rapid transition to and from sobbing or dancing/singing/laughing. Sleep " maintenance is poor and fluctuating. Given her prior diagnosis of MDD with psychosis and her current presentation with acute psychosis in the absence of depressive sxs, and disorganized thinking, a thought disorder seems to be the most likely pathology. Ddx includes schizoaffective disorder and less likely bipolar depression or MDD with psychotic features. Current symptoms, along with initial improvement with lorazepam suggest component of catatonia to her current unspecified condition.     Hospital Course: Ms. Davis was admitted to [facility] on a 72 hour hold. Treatment team filed for commitment and Gu[facility]ro which have been received. Olanzapine was started and uptitrated. PRN hydroxyzine and trazodone were initiated. There was some concern for catatonia given stereotypic movements and poor olanzapine response so she was given lorazepam with marked clinical improvement. Scheduled lorazepam was started. Not in full remission with olanzapine so attempted transition to aripiprazole which she has previously taken with good reported response, but she had recurrence of delusions, increased lability, new hyperactivity/agitation, and worsening sleep maintenance, so she is transitioning back to olanzapine. After demonstrating poor symptom improvement, clozapine was started and titrated while olanzapine was tapered.      Medical Course: CMP, lipid panel, TSH, B12/folate, CBC, sed rate, lyme, treponema, HIV, EMERALD, ceruloplasmin, ECG WNL. Vitamin D low. UA non-infectious. Demonstrated orthostasis, attributable to up-titration of olanzapine. Repeat CBC w/ diff WNL. Bowel regimen initiated for constipation. Ipratropium spray started for sialorrhea. CMP ordered to check for hydration status.    Plan   Principal Diagnosis:   # Unspecified Schizophrenia Spectrum and Other Psychotic Disorder  # R/O Catatonia    Psychotropic Medications:  Modify:  - Increase clozapine to 187.5 mg daily  -Change ipratropium from sublingual to nasal  spray for sialorrhea  -Ocean spray for nasal congestion    Continue:  - Olanzapine 20 mg at bedtime  - Haldol 5 mg Q4H PRN (to use if daily olanzapine total exceeds 40 mg)  - Diphenhydramine 50 mg Q4H PRN  - Lorazepam 2 mg at bedtime, 1 mg BID  - Cogentin 1 mg BID PRN for akathisia/dystonia  - Olanzapine 10 mg Q2H PRN  - Hydroxyzine 25 mg Q4H PRN   - Trazodone 50 mg at bedtime PRN      Patient will be treated in therapeutic milieu with appropriate individual and group therapies as described.    Medical diagnoses to be addressed this admission:    # Vitamin D Deficiency  - Continue ergocalciferol 50,000 Units Q7 days    # Constipation  - Miralax 17 g daily  - Docusate 250 mg daily  - Mylanta/Maalox QID PRN    # Headache/Neck Pain   - Ibuprofen 400 mg Q6H PRN  - Acetaminophen PRN  - Menthol Topical Analgesic Q6H PRN    #Sialorrhea  Secondary to clozapine. No improvement with sublingual ipratropium.  - Discontinue ipratropium spray sublingual  - Start ipratropium nasal spray.    Data: As above.  Consults: None.    Legal Status: Commitment with [email or web address].    Safety Assessment:   Behavioral Orders   Procedures     Code 1 - Restrict to Unit     Elopement precautions     Fall precautions     Routine Programming     As clinically indicated     Single Room     Status 15     Every 15 minutes.     Status Individual Observation     Order Specific Question:   CONTINUOUS 24 hours / day     Answer:   5 feet     Order Specific Question:   Indications for SIO     Answer:   Severe intrusiveness       Disposition: TBD pending clinical stabilization and medication optimization.     Kelli Aguilar, MS3    I was present with the medical student who participated in the service and in the  documentation of the note. I have verified the history and personally performed the  exam and medical decision making. I agree with the assessment and plan of  care as documented in the note.    Naresh Manriquez  December 18,  2018  [phone]        Attestation:  This patient has been seen and evaluated by me, Mauro Vann.  I have discussed this patient with the house staff team including the resident and medical student and I agree with the findings and plan in this note.    I have reviewed today's vital signs, medications, labs and imaging. Mauro Vann MD

## 2018-12-19 PROCEDURE — 25000132 ZZH RX MED GY IP 250 OP 250 PS 637: Performed by: STUDENT IN AN ORGANIZED HEALTH CARE EDUCATION/TRAINING PROGRAM

## 2018-12-19 PROCEDURE — 99232 SBSQ HOSP IP/OBS MODERATE 35: CPT | Mod: GC | Performed by: PSYCHIATRY & NEUROLOGY

## 2018-12-19 PROCEDURE — 12400003 ZZH R&B MH CRITICAL UMMC

## 2018-12-19 PROCEDURE — G0177 OPPS/PHP; TRAIN & EDUC SERV: HCPCS

## 2018-12-19 RX ADMIN — POLYETHYLENE GLYCOL 3350 17 G: 17 POWDER, FOR SOLUTION ORAL at 10:15

## 2018-12-19 RX ADMIN — LORAZEPAM 1 MG: 1 TABLET ORAL at 14:15

## 2018-12-19 RX ADMIN — DOCUSATE SODIUM 250 MG: 250 CAPSULE, LIQUID FILLED ORAL at 10:15

## 2018-12-19 RX ADMIN — LORAZEPAM 2 MG: 2 TABLET ORAL at 21:44

## 2018-12-19 RX ADMIN — IPRATROPIUM BROMIDE 2 SPRAY: 21 SPRAY, METERED NASAL at 21:36

## 2018-12-19 RX ADMIN — OLANZAPINE 10 MG: 10 TABLET, FILM COATED ORAL at 10:45

## 2018-12-19 RX ADMIN — OLANZAPINE 20 MG: 10 TABLET, FILM COATED ORAL at 21:36

## 2018-12-19 RX ADMIN — CLOZAPINE 225 MG: 100 TABLET ORAL at 21:36

## 2018-12-19 RX ADMIN — LORAZEPAM 1 MG: 1 TABLET ORAL at 10:15

## 2018-12-19 ASSESSMENT — ACTIVITIES OF DAILY LIVING (ADL)
ORAL_HYGIENE: INDEPENDENT
LAUNDRY: UNABLE TO COMPLETE
HYGIENE/GROOMING: INDEPENDENT
DRESS: INDEPENDENT;SCRUBS (BEHAVIORAL HEALTH)

## 2018-12-19 NOTE — PROGRESS NOTES
"Pt appears intrusive to other peers families and she was directed couple times. She stated to the writer that she does not someone to \"F\" her. She appeared anxious per the time and came out from her room. She has poor boundaries towards male peers. She was observed pacing in freedman and appears most of the time mute and staring.     12/18/18 5458   Behavioral Health   Hallucinations appears responding   Thinking distractable;delusional;poor concentration   Orientation time: oriented;place: oriented   Memory baseline memory   Insight poor   Judgement impaired   Eye Contact at examiner   Affect tense;blunted, flat;incongruent   Mood anxious;irritable   Hygiene well groomed   Suicidality other (see comments)  (SALAS)   1. Wish to be Dead No   2. Non-Specific Active Suicidal Thoughts  No   Self Injury other (see comment)   Activity hyperactive (agitated, impulsive)   Medication Sensitivity no stated side effects   Psycho Education   Type of Intervention 1:1 intervention   Response participates, initiates socially appropriate   Hours 0.5   Activities of Daily Living   Hygiene/Grooming prompts;independent   Oral Hygiene prompts;independent   Dress independent;scrubs (behavioral health)   Laundry with supervision   Room Organization independent   Activity   Activity Assistance Provided independent     "

## 2018-12-19 NOTE — PROGRESS NOTES
Pt aggressive x5 swinging at staff when having be redirected away from male peer x2 and Dr x3. Calling staff bitch and other profanity numerous times. Pt grabbing  attempting to keep him in her room x2 and as he was attempting to leave unit x1.

## 2018-12-19 NOTE — PLAN OF CARE
Occupational Therapy Goals    Pt attended 1 out of 3 OT groups offered. Pt actively participated in occupational therapy clinic. Pt was able to ask for assistance as needed, and initiated a structured creative expression task with assistance in task set-up. Pt demonstrated fair focus and planning. Some odd behaviors observed, such as spontaneously stopping her task and staring at the back of her hands x2. Affect fluctuated; she smiled and sang along loudly after making song selections, otherwise she spoke very quietly/inaudibly, and stared intensely at writer and peers when not actively engaged in her task. Delayed responses observed when both writer and peers would ask her to repeat herself. Will continue to assess.

## 2018-12-19 NOTE — PROGRESS NOTES
12/19/18 0600   Sleep/Rest/Relaxation   Sleep/Rest/Relaxation (SIO)   Night Time # Hours 6.25 hours   Behavioral Health   Hallucinations other (see comment)  (pt slept for most of the night)   Thinking other (see comment)   Orientation other (see comment)  (pt slept for most of the night)   Insight other (see comment)   Judgement (pt slept for most of the night)   Medication Sensitivity (pt slept)   Psychomotor / Gait (pt slept )   Safety   Elopement signs posted on unit entrance / exit doors   Pt slept most of the night shift (6.5hrs sleep).   At like 5:30am pt asked to come out in the lounge to watch TV and take a shower, writer told pt to sleep some more. Pt would be able to do all of that by 7:30am.   Pt used the restroom and went back to bed.

## 2018-12-19 NOTE — PROGRESS NOTES
Pt seen to have elevated pulse (see flowsheet). Pt's pulse seen to be consistently elevated. On-call provider notified.

## 2018-12-19 NOTE — PROGRESS NOTES
"    ----------------------------------------------------------------------------------------------------------  [facility], [place]   Psychiatric Progress Note  Hospital Day #28     Interim History:   The patient's care was discussed with the treatment team and chart notes were reviewed.    Sleep: 6.25 hrs  Scheduled Medications: Received.  PRN Medications: Haldol x1, Bengay x1, olanzapine x1, trazodone x2  Staff Report: Continues to demonstrate poor boundaries, particularly with male peers, and demonstrate intrusiveness during other patients' family visits. Had poor hygiene and declined to brush teeth when reminded. Noted to have tachycardia to the 130s overnight, a slight increase from her recent baseline in the 120s, and overnight physician was notified.    Patient Interview: Ms. Davis was interviewed in her room. She stood for most of the interview and focused her attention solely on the attending physician. She stated \"I'm not wearing any panties\" and repeatedly expressed her love for him, reached for his hand, and attempted to stand very close to him, despite being reminded of appropriate doctor-patient interactions and being asked to step back. She did not respond when asked how she is doing today. When asked how she slept, she could not remember.     The risks, benefits, alternatives and side effects of any medication changes have been discussed and are understood by the patient and other caregivers.    Review of Systems:   See HPI for pertinent ROS.          Allergies:     Allergies   Allergen Reactions     Septra [Sulfamethoxazole W/Trimethoprim]             Psychiatric Examination:   /88   Pulse 122   Temp 98.4  F (36.9  C) (Oral)   Resp 16   Ht 1.702 m (5' 7\")   Wt 59.4 kg (131 lb)   SpO2 99%   BMI 20.52 kg/m    Weight is 131 lbs 0 oz  Body mass index is 20.52 kg/m .    Appearance: Awake, alert. Wearing hospital scrubs. Disheveled.  Attitude: Longing.  Eye " Contact: Staring.  Mood: No response.  Affect: Constricted with modest lability.  Speech: Increased speech latency. Some paucity of speech. Near normal rate and volume.   Psychomotor Behavior: Slow but fluid movement. No evidence of stereotypic movements, tardive dyskinesia, dystonia or tics. No tremor.   Thought Process: Illogical and confused.  Associations: No loose associations.  Thought Content: Erotomanic delusion focused on attending physician.  Insight: Poor.  Judgment: Poor.  Attention Span and Concentration: Limited.  Recent and Remote Memory: Poor recent memory. Difficult to assess remote memory.  Language: Speaking fluent English with strong accent.  Fund of Knowledge: Difficult to assess.  Muscle Strength and Tone: Appearance is grossly normal, not obviously stiff.  Gait and Station: Normal gait and station.     Labs:     No results found for this or any previous visit (from the past 24 hour(s)).     Assessment    Diagnostic Impression: Ms. Davis is a 23 y.o. F with a psychiatric history of MDD with psychotic features who presented to the [facility] ED via EMS on 11/21/2018 due to psychosis in the setting of medication non-adherence. Her primary presentation is psychosis, as evidenced by reported and witnessed visual and auditory hallucinations including command hallucinations taking the form of her father, delusions that her father is trying to poison her, stereotypic hand movements, writhing leg movements, echopraxia, and delayed speech latency. Demonstrates emotional lability from calm baseline, with rapid transition to and from sobbing or dancing/singing/laughing. Sleep maintenance is poor and fluctuating. Given her prior diagnosis of MDD with psychosis and her current presentation with acute psychosis in the absence of depressive sxs, and disorganized thinking, a thought disorder seems to be the most likely pathology. Ddx includes schizoaffective disorder and less likely bipolar depression or MDD with  psychotic features. Current symptoms, along with initial improvement with lorazepam suggest component of catatonia to her current unspecified condition.     Hospital Course: Ms. Davis was admitted to [facility] on a 72 hour hold. Treatment team filed for commitment and Guerrero which have been received. Olanzapine was started and uptitrated. PRN hydroxyzine and trazodone were initiated. There was some concern for catatonia given stereotypic movements and poor olanzapine response so she was given lorazepam with marked clinical improvement. Scheduled lorazepam was started. Not in full remission with olanzapine so attempted transition to aripiprazole which she has previously taken with good reported response, but she had recurrence of delusions, increased lability, new hyperactivity/agitation, and worsening sleep maintenance, so she is transitioning back to olanzapine. After demonstrating poor symptom improvement, clozapine was started and titrated while olanzapine was tapered.      Medical Course: CMP, lipid panel, TSH, B12/folate, CBC, sed rate, lyme, treponema, HIV, EMERALD, ceruloplasmin, ECG WNL. Vitamin D low. UA non-infectious. Demonstrated orthostasis, attributable to up-titration of olanzapine. Repeat CBC w/ diff WNL. Bowel regimen initiated for constipation. Ipratropium spray started for sialorrhea. CMP WNL.    Plan   Principal Diagnosis:   # Unspecified Schizophrenia Spectrum and Other Psychotic Disorder  # R/O Catatonia    Psychotropic Medications:  Modify:  - Increase clozapine to 225 mg daily. Will obtain level on Friday.    Continue:  - Olanzapine 20 mg at bedtime  - Haldol 5 mg Q4H PRN (to use if daily olanzapine total exceeds 40 mg)  - Diphenhydramine 50 mg Q4H PRN  - Lorazepam 2 mg at bedtime, 1 mg BID  - Cogentin 1 mg BID PRN for akathisia/dystonia  - Olanzapine 10 mg Q2H PRN, with max daily dose of 40 mg  - Hydroxyzine 25 mg Q4H PRN   - Trazodone 50 mg at bedtime PRN  - Ipratropium nasal spray for  sialorrhea  - [place] spray Q1H PRN for nasal congestion    Patient will be treated in therapeutic milieu with appropriate individual and group therapies as described.    Medical diagnoses to be addressed this admission:    # Vitamin D Deficiency  - Continue ergocalciferol 50,000 Units Q7 days    # Constipation  - Miralax 17 g daily  - Docusate 250 mg daily  - Mylanta/Maalox QID PRN    # Headache/Neck Pain   - Ibuprofen 400 mg Q6H PRN  - Acetaminophen PRN  - Menthol Topical Analgesic Q6H PRN    # Sialorrhea  Secondary to clozapine. No improvement with sublingual ipratropium.  - Ipratropium nasal spray    # Nasal Congestion  - Ocean nasal spray PRN    Data: As above.  Consults: None.    Legal Status: Commitment with [employer].    Safety Assessment:   Behavioral Orders   Procedures     Code 1 - Restrict to Unit     Elopement precautions     Fall precautions     Routine Programming     As clinically indicated     Single Room     Status 15     Every 15 minutes.     Status Individual Observation     Order Specific Question:   CONTINUOUS 24 hours / day     Answer:   5 feet     Order Specific Question:   Indications for SIO     Answer:   Severe intrusiveness       Disposition: TBD pending clinical stabilization and medication optimization.     Kelli Aguilar, MS3    I was present with the medical student who participated in the service and in the  documentation of the note. I have verified the history and personally performed the  exam and medical decision making. I agree with the assessment and plan of  care as documented in the note.    Naresh Manriquez  December 19, 2018  [phone]      Attestation:  This patient has been seen and evaluated by me, Mauro Vann.  I have discussed this patient with the house staff team including the resident and medical student and I agree with the findings and plan in this note.    I have reviewed today's vital signs, medications, labs and imaging. Mauro Vann MD

## 2018-12-20 PROCEDURE — 25000132 ZZH RX MED GY IP 250 OP 250 PS 637: Performed by: STUDENT IN AN ORGANIZED HEALTH CARE EDUCATION/TRAINING PROGRAM

## 2018-12-20 PROCEDURE — 12400003 ZZH R&B MH CRITICAL UMMC

## 2018-12-20 PROCEDURE — 99232 SBSQ HOSP IP/OBS MODERATE 35: CPT | Mod: GC | Performed by: PSYCHIATRY & NEUROLOGY

## 2018-12-20 RX ADMIN — LORAZEPAM 2 MG: 2 TABLET ORAL at 22:16

## 2018-12-20 RX ADMIN — HALOPERIDOL 5 MG: 5 TABLET ORAL at 09:20

## 2018-12-20 RX ADMIN — POLYETHYLENE GLYCOL 3350 17 G: 17 POWDER, FOR SOLUTION ORAL at 08:49

## 2018-12-20 RX ADMIN — DIPHENHYDRAMINE HYDROCHLORIDE 50 MG: 25 CAPSULE ORAL at 09:20

## 2018-12-20 RX ADMIN — CLOZAPINE 225 MG: 100 TABLET ORAL at 22:16

## 2018-12-20 RX ADMIN — OLANZAPINE 20 MG: 10 TABLET, FILM COATED ORAL at 22:16

## 2018-12-20 RX ADMIN — IPRATROPIUM BROMIDE 2 SPRAY: 21 SPRAY, METERED NASAL at 22:16

## 2018-12-20 RX ADMIN — LORAZEPAM 1 MG: 1 TABLET ORAL at 08:49

## 2018-12-20 RX ADMIN — DOCUSATE SODIUM 250 MG: 250 CAPSULE, LIQUID FILLED ORAL at 08:49

## 2018-12-20 RX ADMIN — SALINE NASAL SPRAY 1 SPRAY: 1.5 SOLUTION NASAL at 10:27

## 2018-12-20 RX ADMIN — LORAZEPAM 1 MG: 1 TABLET ORAL at 14:12

## 2018-12-20 RX ADMIN — Medication: at 10:27

## 2018-12-20 ASSESSMENT — ACTIVITIES OF DAILY LIVING (ADL)
DRESS: INDEPENDENT
LAUNDRY: WITH SUPERVISION
HYGIENE/GROOMING: PROMPTS
ORAL_HYGIENE: PROMPTS
DRESS: INDEPENDENT
HYGIENE/GROOMING: PROMPTS
ORAL_HYGIENE: PROMPTS

## 2018-12-20 ASSESSMENT — MIFFLIN-ST. JEOR: SCORE: 1376.84

## 2018-12-20 NOTE — PROGRESS NOTES
Irritable, oppositional early in shift. Disorganized, delusional. Slept much of shift after dinner. Ate meal.       12/19/18 2100   Behavioral Health   Hallucinations appears responding   Thinking confused;distractable;delusional;paranoid;poor concentration   Orientation situation, disoriented   Memory confabulation   Insight poor   Judgement impaired   Eye Contact staring;at examiner   Affect blunted, flat   Mood irritable   Physical Appearance/Attire disheveled   Hygiene other (see comment)  (adequate)   1. Wish to be Dead No   2. Non-Specific Active Suicidal Thoughts  No   Activities of Daily Living   Hygiene/Grooming independent   Oral Hygiene independent   Dress independent;scrubs (behavioral health)   Laundry unable to complete   Room Organization independent

## 2018-12-20 NOTE — PROGRESS NOTES
"Pt was isolative and withdrawn to her room, refused groups. Pt had a good visit with her parents who brought her food to eat. She states her doctor is punishing her, assured that was not the case. She required prompting to maintain boundaries around other pt's, at times observed running in the freedman. Pt appears sad, crying several times throughout the day. She stated \"I know exactly what I'm doing, I am going to join the army in the future\". Pt appeared confused about her current situation, denies mental illness.      12/20/18 1432   Behavioral Health   Hallucinations appears responding;auditory   Thinking delusional;paranoid;distractable;poor concentration;confused   Orientation situation, disoriented;person: oriented;time, disoriented;date, disoriented   Memory confabulation   Insight poor   Judgement impaired   Eye Contact at examiner;staring   Affect sad;tense   Mood labile   Physical Appearance/Attire disheveled   Hygiene other (see comment)  (adequate)   Suicidality other (see comments)  (denies)   1. Wish to be Dead No   2. Non-Specific Active Suicidal Thoughts  No   Self Injury other (see comment)  (denies)   Elopement Loitering near exit doors;Statements about wanting to leave;Hallucinations directing behavior   Activity isolative;withdrawn   Speech other (see comments)  (minimally coherent)   Medication Sensitivity no stated side effects;no observed side effects   Psychomotor / Gait balanced;steady   Activities of Daily Living   Hygiene/Grooming prompts   Oral Hygiene prompts   Dress independent   Laundry with supervision   Room Organization independent   Activity   Activity Assistance Provided independent     "

## 2018-12-20 NOTE — PROGRESS NOTES
"    ----------------------------------------------------------------------------------------------------------  [facility], [place]   Psychiatric Progress Note  Hospital Day #29     Interim History:   The patient's care was discussed with the treatment team and chart notes were reviewed.    Sleep: 7 hrs  Scheduled Medications: Received.  PRN Medications: Haldol x1, Bengay x1, olanzapine x1, did NOT take tramadol on 12/19  Staff Report: Irritable, oppositional early in shift. Disorganized, delusional. Slept much of shift after dinner. Ate meal.      Patient Interview: Ms. Davis was interviewed in her room. She had been sleeping when we entered but opened her eyes and was willing to talk, and after a short time sat up to engage with us. As her new care team, we introduced ourselves and she looked at each person with intense eye contact. She was hypoverbal, declining to respond to some questions and speaking only a few words in a whisper when she did respond.  She denied having any concerns, though did ask for food. We offered to go get her an ensure and she followed closely behind (automatic obedience). Her parents had been sitting out in the milieu and we ended the interview so that she could speak with them.    The risks, benefits, alternatives and side effects of any medication changes have been discussed and are understood by the patient and other caregivers.    Review of Systems:   See HPI for pertinent ROS.          Allergies:     Allergies   Allergen Reactions     Septra [Sulfamethoxazole W/Trimethoprim]             Psychiatric Examination:   BP (!) 133/94   Pulse 127   Temp 98.7  F (37.1  C) (Oral)   Resp 16   Ht 1.702 m (5' 7\")   Wt 59.4 kg (131 lb)   SpO2 100%   BMI 20.52 kg/m    Weight is 131 lbs 0 oz  Body mass index is 20.52 kg/m .    Appearance: Awake, alert. Wearing hospital scrubs. Disheveled.  Attitude: Indifferent.  Eye Contact: Staring.  Mood: No " response.  Affect: Constricted with modest lability.  Speech: Increased speech latency. Some paucity of speech. Decreased volume.   Psychomotor Behavior: Slow but fluid movement. No evidence of stereotypic movements, tardive dyskinesia, dystonia or tics. No tremor.   Thought Process: Illogical and confused.  Associations: No loose associations.  Thought Content: Unable to assess  Insight: Poor.  Judgment: Poor.  Attention Span and Concentration: Limited.  Recent and Remote Memory: Poor recent memory. Difficult to assess remote memory.  Language: Speaking fluent English with strong accent.  Fund of Knowledge: Difficult to assess.  Muscle Strength and Tone: Appearance is grossly normal, not obviously stiff.  Gait and Station: slow gait with short shuffling steps.     Labs:     No results found for this or any previous visit (from the past 24 hour(s)).     Assessment    Diagnostic Impression: Ms. Davis is a 23 y.o. F with a psychiatric history of MDD with psychotic features who presented to the [facility] ED via EMS on 11/21/2018 due to psychosis in the setting of medication non-adherence. Her primary presentation is psychosis, as evidenced by reported and witnessed visual and auditory hallucinations including command hallucinations taking the form of her father, delusions that her father is trying to poison her, stereotypic hand movements, writhing leg movements, echopraxia, and delayed speech latency. Demonstrates emotional lability from calm baseline, with rapid transition to and from sobbing or dancing/singing/laughing. Sleep maintenance is poor and fluctuating. Given her prior diagnosis of MDD with psychosis and her current presentation with acute psychosis in the absence of depressive sxs, and disorganized thinking, a thought disorder seems to be the most likely pathology. Ddx includes schizoaffective disorder and less likely bipolar depression or MDD with psychotic features. Current symptoms, along with initial  improvement with lorazepam suggest component of catatonia to her current unspecified condition.     Hospital Course: Ms. Davis was admitted to [facility] on a 72 hour hold. Treatment team filed for commitment and Guerrero which have been received. Olanzapine was started and uptitrated. PRN hydroxyzine and trazodone were initiated. There was some concern for catatonia given stereotypic movements and poor olanzapine response so she was given lorazepam with marked clinical improvement. Scheduled lorazepam was started. Not in full remission with olanzapine so attempted transition to aripiprazole which she has previously taken with good reported response, but she had recurrence of delusions, increased lability, new hyperactivity/agitation, and worsening sleep maintenance, so she is transitioning back to olanzapine. After demonstrating poor symptom improvement, clozapine was started and titrated while olanzapine was tapered.      Medical Course: CMP, lipid panel, TSH, B12/folate, CBC, sed rate, lyme, treponema, HIV, EMERALD, ceruloplasmin, ECG WNL. Vitamin D low. UA non-infectious. Demonstrated orthostasis, attributable to up-titration of olanzapine. Repeat CBC w/ diff WNL. Bowel regimen initiated for constipation. Ipratropium spray started for sialorrhea. CMP WNL.    Plan   Principal Diagnosis:   # Unspecified Schizophrenia Spectrum and Other Psychotic Disorder  # R/O Catatonia    Psychotropic Medications:  Modify:  - Increased clozapine to 225 mg daily on 12/19/18. Will obtain level on Friday.    Continue:  - Olanzapine 20 mg at bedtime  - Haldol 5 mg Q4H PRN (to use if daily olanzapine total exceeds 40 mg)  - Diphenhydramine 50 mg Q4H PRN  - Lorazepam 2 mg at bedtime, 1 mg BID  - Cogentin 1 mg BID PRN for akathisia/dystonia  - Olanzapine 10 mg Q2H PRN, with max daily dose of 40 mg  - Hydroxyzine 25 mg Q4H PRN   - Trazodone 50 mg at bedtime PRN  - Ipratropium nasal spray for sialorrhea  - [place] spray Q1H PRN for nasal  congestion    Considering if continued poor response - scheduling haloperidol, adding Lithium for suspected mood-catatonia (excited stupor), ECT.    Patient will be treated in therapeutic milieu with appropriate individual and group therapies as described.    Medical diagnoses to be addressed this admission:    # Vitamin D Deficiency  - Continue ergocalciferol 50,000 Units Q7 days    # Constipation  - Miralax 17 g daily  - Docusate 250 mg daily  - Mylanta/Maalox QID PRN    # Headache/Neck Pain   - Ibuprofen 400 mg Q6H PRN  - Acetaminophen PRN  - Menthol Topical Analgesic Q6H PRN    # Sialorrhea  Secondary to clozapine. No improvement with sublingual ipratropium.  - Ipratropium nasal spray    # Nasal Congestion  - Ocean nasal spray PRN    Data: As above.  Consults: None.    Legal Status: Commitment with Guerrero.    Safety Assessment:   Behavioral Orders   Procedures     Code 1 - Restrict to Unit     Elopement precautions     Fall precautions     Routine Programming     As clinically indicated     Single Room     Status 15     Every 15 minutes.     Status Individual Observation     Order Specific Question:   CONTINUOUS 24 hours / day     Answer:   5 feet     Order Specific Question:   Indications for SIO     Answer:   Severe intrusiveness     Suicide precautions     Patients on Suicide Precautions should have a Combination Diet ordered that includes a Diet selection(s) AND a Behavioral Tray selection for Safe Tray - with utensils, or Safe Tray - NO utensils         Disposition: TBD pending clinical stabilization and medication optimization.     I, Elana Balderrama, medical student, saw and examined the patient in the presence of Dr. Lim (resident) and Dr. Kiran (attending)  12/20/18  Elana Balderrama, MS3    I have reviewed and edited the documentation recorded by the medical student. This documentation accurately reflects the services I personally performed and treatment decisions made by me, in consultation with  the attending physician Missy Kiran MD.      Jessica Lim MD,  PGY1 Psychiatry resident    Attestation:  This patient has been seen and evaluated by me, Missy Kiran.  I have discussed this patient with the psychiatry resident and I agree with the findings and plan in this note.    I have reviewed today's vital signs, medications, labs and imaging.   Missy Kiran MD.

## 2018-12-20 NOTE — PLAN OF CARE
BEHAVIORAL TEAM DISCUSSION    Participants: Dr. Mauro Manriquez PGY-1  Harleen Kwan [facility]  Progress: Continues to be decompensated   Continued Stay Criteria/Rationale: Patient is under civil commitment with Guerrero order in place. Patient continues to experience psychotic symptoms including AH and VH throughout the day.   Medical/Physical: see psychiatry physician progress note for further details   Precautions:   Behavioral Orders   Procedures     Code 1 - Restrict to Unit     Elopement precautions     Fall precautions     Routine Programming     As clinically indicated     Single Room     Status 15     Every 15 minutes.     Status Individual Observation     Order Specific Question:   CONTINUOUS 24 hours / day     Answer:   5 feet     Order Specific Question:   Indications for SIO     Answer:   Severe intrusiveness     Suicide precautions     Patients on Suicide Precautions should have a Combination Diet ordered that includes a Diet selection(s) AND a Behavioral Tray selection for Safe Tray - with utensils, or Safe Tray - NO utensils       Plan: Patient's clozapine dosage will be increased as her symptoms have not improved on the current dosage.   Rationale for change in precautions or plan: Patient will remain on 1:1 staffing as she continues to have inappropriate boundaries with others and increased agitation that requires constant redirection.

## 2018-12-20 NOTE — PROGRESS NOTES
"Patient oriented to self only. Disorganized, needing much redirection and prompts for ADLs such as toileting, eating. Long periods of staring with paucity and poverty of speech at times. At other times,  speech rambling and unintelligible. Mood labile and ranging from flat to distressed and tearful. She ate oatmeal, cereal and drank fluids with encouragement. Made mention of wanting to kill self during period of distress this morning. Appeared to be responding to internal stimuli at the time, stating \"stop saying that\" when this writer was quietly sitting next to patient on the bed. Prn haldol and diphenhydramine given at 0925. Patient continues on SIO for safety. Suicide precautions added.   She was calmer when rechecked one hour later. Parents came to visit and brought food. She was eating and did not appear to be in any acute distress. Parents stated patient c/o headache, however patient shook her head and declined prn medication for headache when offered. Fluids encouraged. Mother assisted patient to apply [place] to lower back as mother stated she has back pain usually. Patient also given prn nasal spray for dryness as she had bloody nose this am.   Patient with orthostatic drop in BP sitting to standing this am. She was asymptomatic. Fluids and food encouraged. Psychiatry team informed ( Dr. Lim).   She remained calmer and was resting and napping intermittently in bed late morning into afternoon. She did get out of bed around 1400 and took scheduled ativan with encouragement. Listened to music with intermittent emotional lability, crying and reaching out to staff during this time.   "

## 2018-12-21 PROCEDURE — 25000132 ZZH RX MED GY IP 250 OP 250 PS 637: Performed by: STUDENT IN AN ORGANIZED HEALTH CARE EDUCATION/TRAINING PROGRAM

## 2018-12-21 PROCEDURE — 99232 SBSQ HOSP IP/OBS MODERATE 35: CPT | Mod: GC | Performed by: PSYCHIATRY & NEUROLOGY

## 2018-12-21 PROCEDURE — 80159 DRUG ASSAY CLOZAPINE: CPT | Performed by: STUDENT IN AN ORGANIZED HEALTH CARE EDUCATION/TRAINING PROGRAM

## 2018-12-21 PROCEDURE — 36415 COLL VENOUS BLD VENIPUNCTURE: CPT | Performed by: STUDENT IN AN ORGANIZED HEALTH CARE EDUCATION/TRAINING PROGRAM

## 2018-12-21 PROCEDURE — 12400003 ZZH R&B MH CRITICAL UMMC

## 2018-12-21 RX ORDER — OLANZAPINE 10 MG/1
10 TABLET ORAL AT BEDTIME
Status: DISCONTINUED | OUTPATIENT
Start: 2018-12-23 | End: 2018-12-26 | Stop reason: ALTCHOICE

## 2018-12-21 RX ORDER — TRAZODONE HYDROCHLORIDE 100 MG/1
100 TABLET ORAL
Status: DISCONTINUED | OUTPATIENT
Start: 2018-12-21 | End: 2018-12-27

## 2018-12-21 RX ORDER — OLANZAPINE 15 MG/1
15 TABLET ORAL AT BEDTIME
Status: COMPLETED | OUTPATIENT
Start: 2018-12-21 | End: 2018-12-22

## 2018-12-21 RX ADMIN — IPRATROPIUM BROMIDE 2 SPRAY: 21 SPRAY, METERED NASAL at 21:08

## 2018-12-21 RX ADMIN — CLOZAPINE 250 MG: 100 TABLET ORAL at 21:07

## 2018-12-21 RX ADMIN — LORAZEPAM 1 MG: 1 TABLET ORAL at 14:17

## 2018-12-21 RX ADMIN — DOCUSATE SODIUM 250 MG: 250 CAPSULE, LIQUID FILLED ORAL at 08:34

## 2018-12-21 RX ADMIN — OLANZAPINE 15 MG: 15 TABLET, FILM COATED ORAL at 21:07

## 2018-12-21 RX ADMIN — LORAZEPAM 1 MG: 1 TABLET ORAL at 08:34

## 2018-12-21 RX ADMIN — LORAZEPAM 2 MG: 2 TABLET ORAL at 21:07

## 2018-12-21 RX ADMIN — POLYETHYLENE GLYCOL 3350 17 G: 17 POWDER, FOR SOLUTION ORAL at 08:34

## 2018-12-21 NOTE — PROGRESS NOTES
Pt was intermittently present but socially withdrawn in the milieu throughout the shift. Pt was observed sleeping in bed, eating dinner, and watching TV. Pt presented with a blunt/flat to tense affect and was frequently observed staring at people in an intense manner. Pt was observed trying to follow a visitor out the door during visiting hours. Due to pt's gross disorganization and her attempt to elope, this  recommends the pt's 24 hour SIO to be continued.      12/20/18 2200   Behavioral Health   Hallucinations (unsubstantiated)   Thinking confused;distractable;delusional;paranoid;poor concentration   Orientation situation, disoriented   Memory confabulation   Insight poor   Judgement impaired   Eye Contact staring;out of corner of eyes;at examiner   Affect blunted, flat;sad;tense   Mood labile   Physical Appearance/Attire untidy   Hygiene neglected grooming - unclean body, hair, teeth   Suicidality (pt did not report thoughts or urges)   Self Injury (pt did not report thoughts or urges)   Elopement (attempting to follow people out doors)   Activity isolative;withdrawn   Speech (minimal speaking)   Medication Sensitivity no stated side effects;no observed side effects   Psychomotor / Gait balanced;steady   Activities of Daily Living   Hygiene/Grooming prompts   Oral Hygiene prompts   Dress independent   Laundry (pt did not complete)   Room Organization independent   Activity   Activity Assistance Provided assistance, 1 person

## 2018-12-21 NOTE — PROGRESS NOTES
"    ----------------------------------------------------------------------------------------------------------  [facility], [place]   Psychiatric Progress Note  Hospital Day #30     Interim History:   The patient's care was discussed with the treatment team and chart notes were reviewed.    Sleep: 5.75 hrs  Scheduled Medications: Received.  PRN Medications: Haldol x1, Benadryl 50mg x1, Bengay x1  Staff Report: Isolative and withdrawn to her room, refused groups, stated doctor is punishing her, crying several times throughout the day, confused, denies mental illness, tried to follow a visitor out of the unit during visiting hours.      Patient Interview: Ms. Davis was interviewed in her room and then again in the unge. When we first saw her, she was in her room and had her breakfast tray, but she said her stomach is full. She reported feeling fine and upon more prompting if anything was bothering her said \"I want to see my baby.\" We discussed that an option for her would be to talk to her parents and ask her about how her child (~3 year old in [facility], staying with her Grandmother) was doing, and that she could even call her parents to ask. When asked about any trouble with movement, she said \" I feel like someone is controlling me.... The doctor.\" Then she demonstrated some truncal and arm movements for us. We asked if someone had been controlling those movements and she denied, saying \"No, that was me.\" She repeated \"I don't want to live on this unit\" but didn't know where else she would like to go. She reported some trouble sleeping saying as a reason \"I'm thinking of marriage.\"    On 2nd interview with attending, she told us she was doing \"OK,\" except for some  Neck pain. No other pain. She denied AH, VH. She endorsed thought disorganization, saying \"If I'm being honest, I'm not thinking normally.\" When we told her we appreciated her honesty, she smiled and her face brightened and " "she said \"I'm honest, I don't lie.\" When talking about what else has been bothering her she said \"I want to know why I can't laugh\" though couldn't elaborate as to why she thinks she can't laugh. We asked her to demonstrate her gait for us, and she readily did so, and at a very fast pace. When we asked if that was her normal walk she said \"no\" and then demonstrated some stereotyped quick jogging in place, and some almost dance-like movements. We validated that she certainly could move and told her she could pause, which she did, and she returned to her seat watching T.V. In the lounge when our interview was over.    Denies: AH, VH, SI. Endorses: sadness, disordered thinking, trouble moving.    The risks, benefits, alternatives and side effects of any medication changes have been discussed and are understood by the patient and other caregivers.    Review of Systems:   See HPI for pertinent ROS.          Allergies:     Allergies   Allergen Reactions     Septra [Sulfamethoxazole W/Trimethoprim]             Psychiatric Examination:   /84   Pulse 120   Temp 98.4  F (36.9  C) (Oral)   Resp 16   Ht 1.702 m (5' 7\")   Wt 59.4 kg (131 lb)   SpO2 100%   BMI 20.52 kg/m    Weight is 131 lbs 0 oz  Body mass index is 20.52 kg/m .    Appearance: Awake, alert. Wearing hospital scrubs. Disheveled.  Attitude: Neutral cooperative, initially guarded  Eye Contact: Intense, Staring.  Mood: \"I'm fine.\"  Affect: mood congruent, neutral/ blunt, Constricted with modest lability (at one point smiling, bright)  Speech: Increased speech latency. Some paucity of speech. Decreased volume (almost whisper).   Psychomotor Behavior: Slow but fluid movement. Tardive dyskinesia, dystonia or tics. No tremor. Automatic obedience, abnormal mannerisms, exaggerated movements at times (e.g. Running in place), follows others often  Thought Process: Illogical and confused,   Associations: loosening of associations present  Thought Content: Delusion " (feeling controlled), Missing daughter, Not wanting to be on unit  Insight: Poor.  Judgment: Poor.  Attention Span and Concentration: Limited.  Recent and Remote Memory: Poor recent memory. Difficult to assess remote memory.  Language: Speaking fluent English with strong accent.  Fund of Knowledge: Difficult to assess.  Muscle Strength and Tone: Appearance is grossly normal, not obviously stiff.  Gait and Station: slow to fast gait (quick when following someone), some shuffling steps while walking     Labs:     No results found for this or any previous visit (from the past 24 hour(s)).     Assessment    Diagnostic Impression: Ms. Davis is a 23 y.o. F with a psychiatric history of MDD with psychotic features who presented to the [facility] ED via EMS on 11/21/2018 due to psychosis in the setting of medication non-adherence. Her primary presentation is psychosis, as evidenced by reported and witnessed visual and auditory hallucinations including command hallucinations taking the form of her father, delusions that her father is trying to poison her, stereotypic hand movements, writhing leg movements, echopraxia, and delayed speech latency. Demonstrates emotional lability from calm baseline, with rapid transition to and from sobbing or dancing/singing/laughing. Sleep maintenance is poor and fluctuating. Given her prior diagnosis of MDD with psychosis and her current presentation with acute psychosis in the absence of depressive sxs, and disorganized thinking, a thought disorder seems to be the most likely pathology. Ddx includes schizoaffective disorder and less likely bipolar depression or MDD with psychotic features. Current symptoms, along with initial improvement with lorazepam suggest component of catatonia to her current unspecified condition.     Hospital Course: Ms. Davis was admitted to [facility] on a 72 hour hold. Treatment team filed for commitment and Guerrero which have been received. Olanzapine was started and  uptitrated. PRN hydroxyzine and trazodone were initiated. There was some concern for catatonia given stereotypic movements and poor olanzapine response so she was given lorazepam with marked clinical improvement. Scheduled lorazepam was started. Not in full remission with olanzapine so attempted transition to aripiprazole which she has previously taken with good reported response, but she had recurrence of delusions, increased lability, new hyperactivity/agitation, and worsening sleep maintenance, so she is transitioning back to olanzapine. After demonstrating poor symptom improvement, clozapine was started and titrated while olanzapine was tapered.      Medical Course: CMP, lipid panel, TSH, B12/folate, CBC, sed rate, lyme, treponema, HIV, EMERALD, ceruloplasmin, ECG WNL. Vitamin D low. UA non-infectious. Demonstrated orthostasis, attributable to up-titration of olanzapine. Repeat CBC w/ diff WNL. Bowel regimen initiated for constipation. Ipratropium spray started for sialorrhea. CMP WNL.    Plan   Principal Diagnosis:   # Unspecified Schizophrenia Spectrum and Other Psychotic Disorder  # R/O Catatonia    Psychotropic Medications:  Modify:  - Clozapine level today  - Incr Clozapine to 250mg, may incr more change depending on level  - Olanzapine from 20mg to 15mg tonight, titrating over weekend to 10mg    Continue:    - Olanzapine 20 mg at bedtime  - Haldol 5 mg Q4H PRN (to use if daily olanzapine total exceeds 40 mg)  - Diphenhydramine 50 mg Q4H PRN  - Lorazepam 2 mg at bedtime, 1 mg BID  - Cogentin 1 mg BID PRN for akathisia/dystonia  - Olanzapine 10 mg Q2H PRN, with max daily dose of 40 mg  - Hydroxyzine 25 mg Q4H PRN   - Trazodone 50 mg at bedtime PRN  - Ipratropium nasal spray for sialorrhea  - [place] spray Q1H PRN for nasal congestion    Considering if continued poor response - scheduling haloperidol, adding Lithium for suspected mood-catatonia (excited stupor), ECT.    Patient will be treated in therapeutic milieu  with appropriate individual and group therapies as described.    Medical diagnoses to be addressed this admission:    # Vitamin D Deficiency  - Continue ergocalciferol 50,000 Units Q7 days    # Constipation  - Miralax 17 g daily  - Docusate 250 mg daily  - Mylanta/Maalox QID PRN    # Headache/Neck Pain   - Ibuprofen 400 mg Q6H PRN  - Acetaminophen PRN  - Menthol Topical Analgesic Q6H PRN    # Sialorrhea  Secondary to clozapine. No improvement with sublingual ipratropium.  - Ipratropium nasal spray    # Nasal Congestion  - Ocean nasal spray PRN    Data: As above.  Consults: None.    Legal Status: Commitment with Guerrero.    Safety Assessment:   Behavioral Orders   Procedures     Code 1 - Restrict to Unit     Elopement precautions     Fall precautions     Routine Programming     As clinically indicated     Single Room     Status 15     Every 15 minutes.     Status Individual Observation     Order Specific Question:   CONTINUOUS 24 hours / day     Answer:   5 feet     Order Specific Question:   Indications for SIO     Answer:   Severe intrusiveness     Suicide precautions     Patients on Suicide Precautions should have a Combination Diet ordered that includes a Diet selection(s) AND a Behavioral Tray selection for Safe Tray - with utensils, or Safe Tray - NO utensils         Disposition: TBD pending clinical stabilization and medication optimization.     IEvangelina, Medical Student, attest that I saw and evaluated the patient with Dr. Jessica Lim on December 21, 2018.  Evangelina Goddard, MS4    I have reviewed and edited the documentation recorded by the medical student. This documentation accurately reflects the services I personally performed and treatment decisions made by me, in consultation with the attending physician Tomi Li MD.      Jessica Lim MD,  PGY1 Psychiatry resident    Psychiatry Attending Attestation:  This patient has been seen and evaluated by me, Tomi Li M.D.  The  patient's condition and treatment plan were discussed with the resident, and care coordinated with the CTC and RN. I reviewed, edited and agree with the findings and plan in this note.     Tomi Li M.D.   of Psychiatry

## 2018-12-21 NOTE — PROGRESS NOTES
Patient was in periphery of milieu or in her room all shift.  Patient interacts minimally with peers and staff.  Patient declines groups.  Patients mood seemed a bit more stable in mood today.  Patient continues to wander the unit and attempts to follow staff out the exit door.  Patient also attempts to go behind desk or in peers rooms.  Patient appears to accept redirection better.  Patient had a visit from her parents this shift and the visit appeared to go well.       12/21/18 1400   Behavioral Health   Hallucinations auditory;appears responding   Thinking confused;distractable   Orientation place, disoriented;date, disoriented;time, disoriented;person: oriented   Memory confabulation   Insight poor   Judgement impaired   Eye Contact staring;at examiner   Affect blunted, flat;tense   Mood labile  (more calm this shift.)   Physical Appearance/Attire attire appropriate to age and situation   Hygiene well groomed   Suicidality other (see comments)  (patient did not report any thoughts or urges)   Self Injury other (see comment)  (did not report any thoughts or urges)   Elopement Loitering near exit doors   Activity isolative;withdrawn   Speech other (see comments)  (minimally coherent)   Medication Sensitivity no stated side effects   Psychomotor / Gait balanced;steady   Psycho Education   Type of Intervention 1:1 intervention   Response observes from a distance

## 2018-12-22 PROCEDURE — 25000132 ZZH RX MED GY IP 250 OP 250 PS 637: Performed by: STUDENT IN AN ORGANIZED HEALTH CARE EDUCATION/TRAINING PROGRAM

## 2018-12-22 PROCEDURE — 12400003 ZZH R&B MH CRITICAL UMMC

## 2018-12-22 PROCEDURE — G0177 OPPS/PHP; TRAIN & EDUC SERV: HCPCS

## 2018-12-22 RX ADMIN — DOCUSATE SODIUM 250 MG: 250 CAPSULE, LIQUID FILLED ORAL at 08:17

## 2018-12-22 RX ADMIN — LORAZEPAM 1 MG: 1 TABLET ORAL at 08:17

## 2018-12-22 RX ADMIN — ACETAMINOPHEN 650 MG: 325 TABLET, FILM COATED ORAL at 06:50

## 2018-12-22 RX ADMIN — OLANZAPINE 15 MG: 15 TABLET, FILM COATED ORAL at 20:06

## 2018-12-22 RX ADMIN — POLYETHYLENE GLYCOL 3350 17 G: 17 POWDER, FOR SOLUTION ORAL at 08:17

## 2018-12-22 RX ADMIN — IPRATROPIUM BROMIDE 2 SPRAY: 21 SPRAY, METERED NASAL at 20:11

## 2018-12-22 RX ADMIN — CLOZAPINE 250 MG: 100 TABLET ORAL at 20:06

## 2018-12-22 RX ADMIN — LORAZEPAM 1 MG: 1 TABLET ORAL at 14:05

## 2018-12-22 RX ADMIN — LORAZEPAM 2 MG: 2 TABLET ORAL at 20:06

## 2018-12-22 ASSESSMENT — ACTIVITIES OF DAILY LIVING (ADL)
HYGIENE/GROOMING: INDEPENDENT
ORAL_HYGIENE: INDEPENDENT
LAUNDRY: WITH SUPERVISION
HYGIENE/GROOMING: INDEPENDENT
DRESS: INDEPENDENT;SCRUBS (BEHAVIORAL HEALTH)
ORAL_HYGIENE: INDEPENDENT
DRESS: INDEPENDENT

## 2018-12-22 NOTE — PROGRESS NOTES
"Pt minimally out in the milieu,  walked the freedman a little.  Mimicked behaviors of writer.  Pt continues to be confused and states \" I don't know why I'm here to be honest\"  Pt stated that she feels like she's being controlled by the doctor.  Pt ate very little.  Denies SI/SIB.         12/22/18 1500   Behavioral Health   Hallucinations appears responding   Thinking confused;distractable;paranoid   Orientation place: oriented   Memory confabulation   Insight poor   Judgement impaired   Eye Contact at examiner;staring   Affect sad;blunted, flat   Mood other (see comments)  (down)   Physical Appearance/Attire attire appropriate to age and situation   Hygiene other (see comment)  (fair)   Suicidality other (see comments)  (denies)   1. Wish to be Dead No   2. Non-Specific Active Suicidal Thoughts  No   Self Injury other (see comment)  (denies)   Activity isolative;withdrawn   Speech other (see comments)  (minimally coherent,  difficult to understand, speaks softly)   Medication Sensitivity no stated side effects;no observed side effects   Psychomotor / Gait balanced;steady   Psycho Education   Type of Intervention 1:1 intervention   Response participates with encouragement   Hours 0.5   Treatment Detail check in   Activities of Daily Living   Hygiene/Grooming independent   Oral Hygiene independent   Dress independent;scrubs (behavioral health)   Room Organization independent     "

## 2018-12-22 NOTE — PROGRESS NOTES
Pt appears to have the entire night.  No concerns noted or reported.  SIO 1:1 remains ongoing for safety.  Will continue to monitor and support.

## 2018-12-22 NOTE — PLAN OF CARE
RN Assessment    Continues on SIO status. Patient is disorganized and appears to be responding to internal stimuli. She has declined all meals this shift. She is medication compliant. She made one attempt of elopement tonight. She also has attempted get over the RN desk. She is overall redirectable. She does not endorse depression/SI/SIB. She is noted to be staring and smiling at times.

## 2018-12-22 NOTE — PLAN OF CARE
Pt actively participated in occupational therapy clinic with encouragement from writer and 1:1 staff to engage in a hands-on task. Pt engaged in a familiar, creative expression task with assistance and encouragement for task selection and initiation. Pt did not interact with peers or writer, and was tearful throughout her time in group. Though she was tearful, she was able to remain in group and refocus her attention on her selected task.

## 2018-12-23 LAB
CLOZAPINE AND METABOLITES TOTAL: 572 NG/ML
CLOZAPINE SERPL-MCNC: 409 NG/ML
CLOZAPINE-N-OXIDE QUANT: <100 NG/ML
NORCLOZAPINE SERPL-MCNC: 163 NG/ML

## 2018-12-23 PROCEDURE — 99231 SBSQ HOSP IP/OBS SF/LOW 25: CPT | Performed by: PSYCHIATRY & NEUROLOGY

## 2018-12-23 PROCEDURE — 25000132 ZZH RX MED GY IP 250 OP 250 PS 637: Performed by: STUDENT IN AN ORGANIZED HEALTH CARE EDUCATION/TRAINING PROGRAM

## 2018-12-23 PROCEDURE — 12400003 ZZH R&B MH CRITICAL UMMC

## 2018-12-23 RX ADMIN — TRAZODONE HYDROCHLORIDE 100 MG: 100 TABLET ORAL at 02:01

## 2018-12-23 RX ADMIN — DOCUSATE SODIUM 250 MG: 250 CAPSULE, LIQUID FILLED ORAL at 10:44

## 2018-12-23 RX ADMIN — LORAZEPAM 1 MG: 1 TABLET ORAL at 10:44

## 2018-12-23 RX ADMIN — LORAZEPAM 2 MG: 2 TABLET ORAL at 21:26

## 2018-12-23 RX ADMIN — HYDROXYZINE HYDROCHLORIDE 25 MG: 25 TABLET ORAL at 02:02

## 2018-12-23 RX ADMIN — IPRATROPIUM BROMIDE 2 SPRAY: 21 SPRAY, METERED NASAL at 21:27

## 2018-12-23 RX ADMIN — CLOZAPINE 250 MG: 100 TABLET ORAL at 21:27

## 2018-12-23 RX ADMIN — ACETAMINOPHEN 650 MG: 325 TABLET, FILM COATED ORAL at 02:00

## 2018-12-23 RX ADMIN — OLANZAPINE 10 MG: 10 TABLET, FILM COATED ORAL at 21:26

## 2018-12-23 RX ADMIN — POLYETHYLENE GLYCOL 3350 17 G: 17 POWDER, FOR SOLUTION ORAL at 10:45

## 2018-12-23 RX ADMIN — ACETAMINOPHEN 650 MG: 325 TABLET, FILM COATED ORAL at 11:14

## 2018-12-23 RX ADMIN — LORAZEPAM 1 MG: 1 TABLET ORAL at 15:23

## 2018-12-23 ASSESSMENT — ACTIVITIES OF DAILY LIVING (ADL)
HYGIENE/GROOMING: INDEPENDENT
LAUNDRY: UNABLE TO COMPLETE
ORAL_HYGIENE: INDEPENDENT
HYGIENE/GROOMING: INDEPENDENT
DRESS: INDEPENDENT
DRESS: INDEPENDENT;SCRUBS (BEHAVIORAL HEALTH)
ORAL_HYGIENE: INDEPENDENT

## 2018-12-23 NOTE — PROGRESS NOTES
"Rc'd pt. sleeping w/ regular non-labored respirations as shift commenced.  Wakeful just after 0100, up pacing about her room.  Stares intensely at this staff in response to assessment of  Pt .for any needs. After long hesitation, pt remarked \"I want to see my baby\" - referring to her 3 y/o daughter who is with family in [facility].  \"I want to get  and see my baby\".  Then immediately began to do some exercises including jumping up and down in place. Tolerating fluids well;  Voided freely.  Pillow case changed out due to drooling;  Refused to change wet scrub top or attend to needed hygienic measures as encouraged. .  Saddened affect w/ intermittent mild agitation.  Prn's administered as ordered with positive effectiveness by 0315.  SIO in continuous progress for safety, intrusiveness w/ poor boundaries and elopement potential w/attempts.     Observed to have slept comfortably  for approx. 5 hrs overnight.  Towel changed out a 2nd time r/t excessive drooling.  Safe, therapeutic environment maintained.  SIO continues.      "

## 2018-12-23 NOTE — PROGRESS NOTES
"Psychiatry Progress Note:    S: I met with the patient in their room. Staff reported that she has been tachycardic (pulse of 120s) with a temp of 99.6 F. Patient is on a 1:1. Olanzapine is being tapered off by the inpatient team and clozapine is being titrated.    O: The patient was cooperative. She was in bed. She said that she was \"sick in the bed.\" She reported no physical complaints. She reported no suicidal thoughts or obvious response to internal stimuli. Mood congruent. Insight poor, judgment is fair.    A/P:  Schizophrenia  Continue cross titration of Clozapine with reduction of Olanzapine.  Discussed with on-call resident  Continue current code and legal status  "

## 2018-12-23 NOTE — PROGRESS NOTES
"   12/22/18 2000   Behavioral Health   Hallucinations appears responding   Thinking poor concentration   Orientation person: oriented;place: oriented   Memory baseline memory   Insight poor   Judgement impaired   Eye Contact at examiner   Affect blunted, flat;sad   Mood mood is calm   Physical Appearance/Attire neat   Hygiene well groomed   Suicidality other (see comments)  (denies)   1. Wish to be Dead No   2. Non-Specific Active Suicidal Thoughts  No   Self Injury other (see comment)  (denies)   Activity isolative;withdrawn   Speech clear;coherent   Activities of Daily Living   Hygiene/Grooming independent   Oral Hygiene independent   Dress independent   Laundry with supervision   Room Organization independent       Pt denied SI and SIB.  Pt reported feeling depressed (8), agitated, confused and sad \" I want to get .\"  Pt reported overall feeling \"I'm fine.\"  Pt seems calm, ate food brought by family, isolative, withdrawn and spend most of the shift in bed.  Pt is independent with ADL's.  Pt wants visitors.    "

## 2018-12-23 NOTE — PROGRESS NOTES
"Pt stayed in room majority of shift, reports feeling sedated and sick but is unable to describe how she feels sick.  Had a high temp and pulse.  Pt eating very little during meals.  Pt ate more when 1:1 staff encouraged her to do so.  Had one episode of sobbing during breakfast.  Disorganized.  Pt still confused on why she's in the hospital.  Pt rolled ice on her face saying \"it makes my skin soft\".  Denies SI/SIB.         12/23/18 1500   Behavioral Health   Hallucinations appears responding   Thinking confused;poor concentration   Orientation place: oriented;situation, disoriented   Memory confabulation   Insight poor   Judgement impaired   Eye Contact at examiner;staring   Affect blunted, flat;sad   Mood mood is calm   Physical Appearance/Attire attire appropriate to age and situation   Hygiene other (see comment)  (fair)   Suicidality other (see comments)  (denies)   1. Wish to be Dead No   2. Non-Specific Active Suicidal Thoughts  No   Self Injury other (see comment)  (denies)   Activity isolative;withdrawn   Speech other (see comments)  (speaks softly, minimally coherent)   Medication Sensitivity sedation   Psychomotor / Gait balanced;steady   Psycho Education   Type of Intervention 1:1 intervention   Response participates with encouragement   Hours 0.5   Treatment Detail check in   Activities of Daily Living   Hygiene/Grooming independent   Oral Hygiene independent   Dress independent   Room Organization independent     "

## 2018-12-24 LAB
BASOPHILS # BLD AUTO: 0 10E9/L (ref 0–0.2)
BASOPHILS NFR BLD AUTO: 0.2 %
DIFFERENTIAL METHOD BLD: NORMAL
EOSINOPHIL # BLD AUTO: 0.2 10E9/L (ref 0–0.7)
EOSINOPHIL NFR BLD AUTO: 2.9 %
ERYTHROCYTE [DISTWIDTH] IN BLOOD BY AUTOMATED COUNT: 13 % (ref 10–15)
HCT VFR BLD AUTO: 38.5 % (ref 35–47)
HGB BLD-MCNC: 12.9 G/DL (ref 11.7–15.7)
IMM GRANULOCYTES # BLD: 0 10E9/L (ref 0–0.4)
IMM GRANULOCYTES NFR BLD: 0.4 %
LYMPHOCYTES # BLD AUTO: 1.8 10E9/L (ref 0.8–5.3)
LYMPHOCYTES NFR BLD AUTO: 34.6 %
MCH RBC QN AUTO: 26.9 PG (ref 26.5–33)
MCHC RBC AUTO-ENTMCNC: 33.5 G/DL (ref 31.5–36.5)
MCV RBC AUTO: 80 FL (ref 78–100)
MONOCYTES # BLD AUTO: 0.4 10E9/L (ref 0–1.3)
MONOCYTES NFR BLD AUTO: 7.1 %
NEUTROPHILS # BLD AUTO: 2.9 10E9/L (ref 1.6–8.3)
NEUTROPHILS NFR BLD AUTO: 54.8 %
NRBC # BLD AUTO: 0 10*3/UL
NRBC BLD AUTO-RTO: 0 /100
PLATELET # BLD AUTO: 211 10E9/L (ref 150–450)
RBC # BLD AUTO: 4.8 10E12/L (ref 3.8–5.2)
WBC # BLD AUTO: 5.2 10E9/L (ref 4–11)

## 2018-12-24 PROCEDURE — 12400003 ZZH R&B MH CRITICAL UMMC

## 2018-12-24 PROCEDURE — 25000132 ZZH RX MED GY IP 250 OP 250 PS 637: Performed by: STUDENT IN AN ORGANIZED HEALTH CARE EDUCATION/TRAINING PROGRAM

## 2018-12-24 PROCEDURE — 85025 COMPLETE CBC W/AUTO DIFF WBC: CPT | Performed by: STUDENT IN AN ORGANIZED HEALTH CARE EDUCATION/TRAINING PROGRAM

## 2018-12-24 PROCEDURE — 36415 COLL VENOUS BLD VENIPUNCTURE: CPT | Performed by: STUDENT IN AN ORGANIZED HEALTH CARE EDUCATION/TRAINING PROGRAM

## 2018-12-24 PROCEDURE — 25000132 ZZH RX MED GY IP 250 OP 250 PS 637: Performed by: PSYCHIATRY & NEUROLOGY

## 2018-12-24 RX ADMIN — CLOZAPINE 250 MG: 100 TABLET ORAL at 20:51

## 2018-12-24 RX ADMIN — OLANZAPINE 10 MG: 10 TABLET, FILM COATED ORAL at 21:13

## 2018-12-24 RX ADMIN — POLYETHYLENE GLYCOL 3350 17 G: 17 POWDER, FOR SOLUTION ORAL at 08:47

## 2018-12-24 RX ADMIN — LORAZEPAM 2 MG: 2 TABLET ORAL at 20:53

## 2018-12-24 RX ADMIN — DOCUSATE SODIUM 250 MG: 250 CAPSULE, LIQUID FILLED ORAL at 08:47

## 2018-12-24 RX ADMIN — IPRATROPIUM BROMIDE 2 SPRAY: 21 SPRAY, METERED NASAL at 20:54

## 2018-12-24 RX ADMIN — LORAZEPAM 1 MG: 1 TABLET ORAL at 14:00

## 2018-12-24 RX ADMIN — LORAZEPAM 1 MG: 1 TABLET ORAL at 08:47

## 2018-12-24 RX ADMIN — ERGOCALCIFEROL 50000 UNITS: 1.25 CAPSULE ORAL at 16:46

## 2018-12-24 ASSESSMENT — ACTIVITIES OF DAILY LIVING (ADL)
GROOMING: INDEPENDENT
LAUNDRY: UNABLE TO COMPLETE
LAUNDRY: UNABLE TO COMPLETE
ORAL_HYGIENE: INDEPENDENT
HYGIENE/GROOMING: INDEPENDENT
ORAL_HYGIENE: INDEPENDENT
DRESS: INDEPENDENT;SCRUBS (BEHAVIORAL HEALTH)
DRESS: INDEPENDENT

## 2018-12-24 NOTE — PLAN OF CARE
Pt has been more active in milieu this evening watching T.V. And had a visit from her  and Mom. Pt withdrawn and stares at times with blunted affect, but has been cooperative and calm throughout the shift. Pt ate her dinner that her  and mom brought in. No stated SI/SIB or hallucinations. No other concerns at this time.

## 2018-12-24 NOTE — PROGRESS NOTES
Pt slept through the night, woke up at around 3:45am, used the bathroom, changed into new scrubs and had her linen changed due to heavy drooling.  Pt went back to sleep then woke up later in the morning.  Confused, disorganized.  Pt ate little breakfast then continued to sleep into early mid day.  Pt also ate very little of her lunch then filled her menu out for the next day independently.  Pt had one short sobbing period where she was singing Superpedest[employer] songs.  Stating she missed being in the choir.  Pt called her father and is expecting him to visit this evening.  Denies SI/SIB.  Continues to make statements about wanting to leave.         12/24/18 1500   Behavioral Health   Hallucinations appears responding   Thinking confused;paranoid;poor concentration   Orientation place: oriented;situation, disoriented   Memory confabulation   Insight poor   Judgement impaired   Eye Contact at examiner;staring   Affect blunted, flat;sad   Mood mood is calm   Physical Appearance/Attire attire appropriate to age and situation   Hygiene other (see comment)  (fair)   Suicidality other (see comments)  (denies)   1. Wish to be Dead No   2. Non-Specific Active Suicidal Thoughts  No   Self Injury other (see comment)  (denies)   Elopement Statements about wanting to leave   Activity withdrawn;isolative   Speech other (see comments)  (minimally coherent)   Medication Sensitivity sedation   Psychomotor / Gait balanced;steady   Psycho Education   Type of Intervention 1:1 intervention   Response participates with encouragement   Hours 0.5   Treatment Detail check in   Activities of Daily Living   Hygiene/Grooming independent   Oral Hygiene independent   Dress independent;scrubs (behavioral health)   Laundry unable to complete   Room Organization independent

## 2018-12-25 PROCEDURE — 25000132 ZZH RX MED GY IP 250 OP 250 PS 637: Performed by: STUDENT IN AN ORGANIZED HEALTH CARE EDUCATION/TRAINING PROGRAM

## 2018-12-25 PROCEDURE — 12400003 ZZH R&B MH CRITICAL UMMC

## 2018-12-25 RX ADMIN — IPRATROPIUM BROMIDE 2 SPRAY: 21 SPRAY, METERED NASAL at 20:46

## 2018-12-25 RX ADMIN — LORAZEPAM 2 MG: 2 TABLET ORAL at 20:44

## 2018-12-25 RX ADMIN — LORAZEPAM 1 MG: 1 TABLET ORAL at 14:41

## 2018-12-25 RX ADMIN — LORAZEPAM 1 MG: 1 TABLET ORAL at 09:21

## 2018-12-25 RX ADMIN — POLYETHYLENE GLYCOL 3350 17 G: 17 POWDER, FOR SOLUTION ORAL at 09:21

## 2018-12-25 RX ADMIN — OLANZAPINE 10 MG: 10 TABLET, FILM COATED ORAL at 20:44

## 2018-12-25 RX ADMIN — CLOZAPINE 250 MG: 100 TABLET ORAL at 20:43

## 2018-12-25 RX ADMIN — DOCUSATE SODIUM 250 MG: 250 CAPSULE, LIQUID FILLED ORAL at 09:22

## 2018-12-25 ASSESSMENT — ACTIVITIES OF DAILY LIVING (ADL)
DRESS: INDEPENDENT
LAUNDRY: WITH SUPERVISION
HYGIENE/GROOMING: INDEPENDENT
LAUNDRY: UNABLE TO COMPLETE
ORAL_HYGIENE: INDEPENDENT
ORAL_HYGIENE: INDEPENDENT
HYGIENE/GROOMING: INDEPENDENT
DRESS: INDEPENDENT

## 2018-12-25 NOTE — PROGRESS NOTES
Pt was asleep in her room, in bed, at the start of the shift.  Pt appeared to sleep comfortably, waking once for approximately thirty minutes, during which time pt used the restroom and drank water.  Total sleep this shift: 6.5 hours.  No physical, safety or behavioral concerns reported or observed.

## 2018-12-25 NOTE — PROGRESS NOTES
Pt was sleeping majority of the day, ate little during meals.  Sedated.  No SI/SIB observed.         12/25/18 1100   Behavioral Health   Hallucinations appears responding   Thinking confused;distractable;paranoid;poor concentration   Orientation situation, disoriented;place: oriented   Memory confabulation   Insight poor   Judgement impaired   Eye Contact at examiner;staring   Affect blunted, flat   Mood mood is calm   Physical Appearance/Attire attire appropriate to age and situation   Hygiene other (see comment)  (adequate)   Suicidality other (see comments)  (denies)   1. Wish to be Dead No   2. Non-Specific Active Suicidal Thoughts  No   Self Injury other (see comment)  (denies)   Activity isolative;withdrawn   Speech other (see comments)  (minimally coherent)   Medication Sensitivity sedation   Psychomotor / Gait balanced;steady   Psycho Education   Type of Intervention 1:1 intervention   Response participates with encouragement   Hours 0.5   Treatment Detail check in   Activities of Daily Living   Hygiene/Grooming independent   Oral Hygiene independent   Dress independent   Laundry unable to complete   Room Organization independent

## 2018-12-25 NOTE — PROGRESS NOTES
12/24/18 8737   Behavioral Health   Hallucinations appears responding   Thinking confused;paranoid;distractable;poor concentration   Orientation situation, disoriented;person: oriented   Memory confabulation   Insight poor   Judgement impaired   Eye Contact staring   Affect blunted, flat   Mood mood is calm   Physical Appearance/Attire attire appropriate to age and situation   Hygiene well groomed   Suicidality other (see comments)  (SALAS, but none observed)   1. Wish to be Dead No   2. Non-Specific Active Suicidal Thoughts  No   Self Injury other (see comment)  (None observed)   Elopement Statements about wanting to leave  (Walking towards the doors)   Activity withdrawn;isolative   Speech mute   Medication Sensitivity sedation   Psychomotor / Gait balanced;steady   Psycho Education   Type of Intervention 1:1 intervention   Response unavailable   Hours 0.5   Treatment Detail Check-In   Activities of Daily Living   Hygiene/Grooming independent   Oral Hygiene independent   Dress independent   Laundry unable to complete   Room Organization independent     Pt was sleeping in her room for the majority of the shift. Pt came out of her room to meet with her family during visiting hours. After the visit was over, pt wanted to leave with her family. So her father stayed with her for an extra hour to help her fall asleep. Pt slept once her father left.

## 2018-12-26 PROCEDURE — 25000132 ZZH RX MED GY IP 250 OP 250 PS 637: Performed by: STUDENT IN AN ORGANIZED HEALTH CARE EDUCATION/TRAINING PROGRAM

## 2018-12-26 PROCEDURE — 25000132 ZZH RX MED GY IP 250 OP 250 PS 637: Performed by: PSYCHIATRY & NEUROLOGY

## 2018-12-26 PROCEDURE — 99232 SBSQ HOSP IP/OBS MODERATE 35: CPT | Mod: GC | Performed by: PSYCHIATRY & NEUROLOGY

## 2018-12-26 PROCEDURE — 12400003 ZZH R&B MH CRITICAL UMMC

## 2018-12-26 RX ADMIN — IPRATROPIUM BROMIDE 2 SPRAY: 21 SPRAY, METERED NASAL at 21:29

## 2018-12-26 RX ADMIN — HALOPERIDOL 5 MG: 5 TABLET ORAL at 16:11

## 2018-12-26 RX ADMIN — POLYETHYLENE GLYCOL 3350 17 G: 17 POWDER, FOR SOLUTION ORAL at 09:13

## 2018-12-26 RX ADMIN — LORAZEPAM 2 MG: 2 TABLET ORAL at 21:29

## 2018-12-26 RX ADMIN — DIPHENHYDRAMINE HYDROCHLORIDE 50 MG: 25 CAPSULE ORAL at 16:11

## 2018-12-26 RX ADMIN — LORAZEPAM 1 MG: 1 TABLET ORAL at 13:50

## 2018-12-26 RX ADMIN — CLOZAPINE 275 MG: 100 TABLET ORAL at 21:30

## 2018-12-26 RX ADMIN — DOCUSATE SODIUM 250 MG: 250 CAPSULE, LIQUID FILLED ORAL at 09:13

## 2018-12-26 RX ADMIN — LORAZEPAM 1 MG: 1 TABLET ORAL at 09:13

## 2018-12-26 ASSESSMENT — ACTIVITIES OF DAILY LIVING (ADL)
ORAL_HYGIENE: PROMPTS
DRESS: PROMPTS
HYGIENE/GROOMING: PROMPTS
DRESS: PROMPTS
LAUNDRY: UNABLE TO COMPLETE
HYGIENE/GROOMING: PROMPTS
ORAL_HYGIENE: PROMPTS

## 2018-12-26 NOTE — PROGRESS NOTES
"    ----------------------------------------------------------------------------------------------------------  [facility], [place]   Psychiatric Progress Note  Hospital Day #35     Interim History:   The patient's care was discussed with the treatment team and chart notes were reviewed.    Sleep: 6.5 hrs  Scheduled Medications: Received.  Staff Report: Withdrawn, sedated. Had good visits with family. Wants to go home. Noted significant drooling still. Also has pulse in the 120s, no other symptoms.    Patient Interview:   Patient was interviewed in her room. She appeared drowsy but was aroused for the interview. At which time, she appears to be less active than previous days and generally disorganized. She did not engage with iterviewer did not actively endorse symptoms of hallucinations or delusions. She did not report any major adverse effects related to medications. She did not have any chest pain or palpitations or any postural dizziness. She was not concerned with the drooling today that staff were noticing. She appears more blunted in her affect, but walks briskly, often to the exit doors and has no other catatonic features. Her parents who visit everyday report seeing changes with her being more interactive and responding to questions. She still says she wants to  doctor though.    The risks, benefits, alternatives and side effects of any medication changes have been discussed and are understood by the patient and other caregivers.    Review of Systems:   See HPI for pertinent ROS.          Allergies:     Allergies   Allergen Reactions     Septra [Sulfamethoxazole W/Trimethoprim]             Psychiatric Examination:   /85   Pulse 118   Temp 99.5  F (37.5  C) (Oral)   Resp 12   Ht 1.702 m (5' 7\")   Wt 59.4 kg (131 lb)   SpO2 100%   BMI 20.52 kg/m    Weight is 131 lbs 0 oz  Body mass index is 20.52 kg/m .    Appearance: Awake, alert. Wearing hospital scrubs. " Disheveled. Sudden unexpected moves towards interviewer but is re-directed.  Attitude: Minimally co-operative.  Eye Contact: Staring.  Mood: No response.  Affect: Constricted with modest lability.  Speech: Increased speech latency. Some paucity of speech. Decreased volume.   Psychomotor Behavior: Slow but fluid movement. No evidence of stereotypic movements, tardive dyskinesia, dystonia or tics. No tremor.   Thought Process: Illogical and confused.  Associations: No loose associations.  Thought Content: Unable to assess  Insight: Poor.  Judgment: Poor.  Attention Span and Concentration: Limited.  Recent and Remote Memory: Poor recent memory. Difficult to assess remote memory.  Language: Speaking fluent English with strong accent.  Fund of Knowledge: Difficult to assess.  Muscle Strength and Tone: Appearance is grossly normal, not obviously stiff. Mild EPS.  Gait and Station: normal appearing gait, unsteady/shuffling at times.     Labs:     Norclozapine Quant 163   ng/mL Final 12/21/2018  7:44 AM 13   Clozapine-N-Oxide Quant <100   ng/mL Final 12/21/2018  7:44 AM 13   Clozapine and Metabolites Total 572  <=1,500 ng/mL Final 12/21/2018  7:44 AM 13          Assessment    Diagnostic Impression: Ms. Davis is a 23 y.o. F with a psychiatric history of MDD with psychotic features who presented to the [facility] ED via EMS on 11/21/2018 due to psychosis in the setting of medication non-adherence. Her primary presentation is psychosis, as evidenced by reported and witnessed visual and auditory hallucinations including command hallucinations taking the form of her father, delusions that her father is trying to poison her, stereotypic hand movements, writhing leg movements, echopraxia, and delayed speech latency. Demonstrates emotional lability from calm baseline, with rapid transition to and from sobbing or dancing/singing/laughing. Sleep maintenance is poor and fluctuating. Given her prior diagnosis of MDD with psychosis and her  current presentation with acute psychosis in the absence of depressive sxs, and disorganized thinking, a thought disorder seems to be the most likely pathology. Ddx includes schizoaffective disorder and less likely bipolar depression or MDD with psychotic features. Current symptoms, along with initial improvement with lorazepam suggest component of catatonia to her current unspecified condition.     Hospital Course: Ms. Davis was admitted to [facility] on a 72 hour hold. Treatment team filed for commitment and [facility] which have been received. Olanzapine was started and uptitrated. PRN hydroxyzine and trazodone were initiated. There was some concern for catatonia given stereotypic movements and poor olanzapine response so she was given lorazepam with marked clinical improvement. Scheduled lorazepam was started. Not in full remission with olanzapine so attempted transition to aripiprazole which she has previously taken with good reported response, but she had recurrence of delusions, increased lability, new hyperactivity/agitation, and worsening sleep maintenance, so she is transitioning back to olanzapine. After demonstrating poor symptom improvement, clozapine was started and titrated while olanzapine was tapered.      Medical Course: CMP, lipid panel, TSH, B12/folate, CBC, sed rate, lyme, treponema, HIV, EMERALD, ceruloplasmin, ECG WNL. Vitamin D low. UA non-infectious. Demonstrated orthostasis, attributable to up-titration of olanzapine. Repeat CBC w/ diff WNL. Bowel regimen initiated for constipation. Ipratropium spray started for sialorrhea. CMP WNL.    Plan   Principal Diagnosis:   # Unspecified Schizophrenia Spectrum and Other Psychotic Disorder  # R/O Catatonia    Psychotropic Medications:  Modify:  - Increased clozapine to 275 mg daily (12/26/18 )    Continue:  - Olanzapine stopped   - Haldol 5 mg Q4H PRN   - Diphenhydramine 50 mg Q4H PRN  - Lorazepam 2 mg at bedtime, 1 mg BID  - Cogentin 1 mg BID PRN for  akathisia/dystonia  - Hydroxyzine 25 mg Q4H PRN   - Trazodone 50 mg at bedtime PRN  - Ipratropium nasal spray for sialorrhea  - Whatley spray Q1H PRN for nasal congestion    Considering if continued poor response - scheduling haloperidol, adding Lithium for suspected mood-catatonia (excited stupor), review depression presentation- serotonin specific reuptake inhibitor trial or  ECT.    Patient will be treated in therapeutic milieu with appropriate individual and group therapies as described.    Medical diagnoses to be addressed this admission:    # Vitamin D Deficiency  - Continue ergocalciferol 50,000 Units Q7 days    # Constipation  - Miralax 17 g daily  - Docusate 250 mg daily  - Mylanta/Maalox QID PRN    # Headache/Neck Pain   - Ibuprofen 400 mg Q6H PRN  - Acetaminophen PRN  - Menthol Topical Analgesic Q6H PRN    # Sialorrhea  Secondary to clozapine. No improvement with sublingual ipratropium.  - Ipratropium nasal spray    # Nasal Congestion  - Ocean nasal spray PRN    Data: As above.  Consults: None.    Legal Status: Commitment with [facility].    Safety Assessment:   Behavioral Orders   Procedures     Code 1 - Restrict to Unit     Elopement precautions     Fall precautions     Routine Programming     As clinically indicated     Single Room     Status 15     Every 15 minutes.     Status Individual Observation     Order Specific Question:   CONTINUOUS 24 hours / day     Answer:   5 feet     Order Specific Question:   Indications for SIO     Answer:   Severe intrusiveness     Suicide precautions     Patients on Suicide Precautions should have a Combination Diet ordered that includes a Diet selection(s) AND a Behavioral Tray selection for Safe Tray - with utensils, or Safe Tray - NO utensils         Disposition: TBD pending clinical stabilization and medication optimization.     Patient was seen and discussed with the attending MD Jessica Lockett MD,  PGY1 Psychiatry resident    Attestation:  I,  Mario Donnelly, saw and evaluated the patient with the resident physician.  I agree with the findings and plan of care as documented in the resident note.  I have reviewed all labs and vital signs.

## 2018-12-26 NOTE — PLAN OF CARE
"This morning  was staring frequently at staff and peers. Writer checked in with patient. Patient's responses were delayed. Was flat and blunted. Denies depression, anxiety and hallucinations. Appears to be responding to internal stimuli. When asked about paranoia patient's response was \"my father is controlling me\". Parents visited middle/late morning and brought food for patient. Patient ate the food and then went and laid down in bed. Patient eventually fell asleep and got up early afternoon. At one point patient sat down so close to a male peer she was touching him. Patient had to be redirected. States had a large BM today. This afternoon has been sitting in the lounge watching TV for approximately 45 minutes. Went back to bed and appears to be sleeping at 1430. Heart rate is elevated, see flow sheet. Dr. Lim was notified and states is aware and is watching pulse.   "

## 2018-12-26 NOTE — PROGRESS NOTES
Pt appeared to have a good visit with her parents this evening, her mother helped her take a shower. Pt slept and was isolative and withdrawn to her room the remainder of the evening.      12/25/18 2100   Behavioral Health   Hallucinations appears responding   Thinking distractable;confused;poor concentration;paranoid   Orientation person: oriented;situation, disoriented;place: oriented   Memory confabulation   Insight poor   Judgement impaired   Eye Contact at examiner;staring   Affect blunted, flat   Mood mood is calm   Physical Appearance/Attire attire appropriate to age and situation   Hygiene well groomed   Suicidality other (see comments)  (denies)   1. Wish to be Dead No   2. Non-Specific Active Suicidal Thoughts  No   Self Injury other (see comment)  (denies)   Activity isolative;withdrawn   Speech mute   Medication Sensitivity sedation   Psychomotor / Gait balanced;steady   Activities of Daily Living   Hygiene/Grooming independent   Oral Hygiene independent   Dress independent   Laundry with supervision   Room Organization independent   Activity   Activity Assistance Provided independent

## 2018-12-26 NOTE — PROGRESS NOTES
[]Hover for details      Pt had a good sleep in her room, from the start of the shift,Pt appeared to sleep comfortably, woke up just once at about 06:00 Am during which time pt used the restroom,came to the lounge area and went towards the nursing station and requested that she wants to go home,pt had little drooling and her pillow case and bed sheets was changed and after that pt went back to sleep and requested her room light to be left on.Total sleep on this shift was 6.5 hours.  No physical aggression or behavioral concerns observed.

## 2018-12-27 PROCEDURE — 25000132 ZZH RX MED GY IP 250 OP 250 PS 637: Performed by: PSYCHIATRY & NEUROLOGY

## 2018-12-27 PROCEDURE — 25000132 ZZH RX MED GY IP 250 OP 250 PS 637: Performed by: STUDENT IN AN ORGANIZED HEALTH CARE EDUCATION/TRAINING PROGRAM

## 2018-12-27 PROCEDURE — 99232 SBSQ HOSP IP/OBS MODERATE 35: CPT | Mod: GC | Performed by: PSYCHIATRY & NEUROLOGY

## 2018-12-27 PROCEDURE — 12400003 ZZH R&B MH CRITICAL UMMC

## 2018-12-27 RX ADMIN — LORAZEPAM 1 MG: 1 TABLET ORAL at 09:46

## 2018-12-27 RX ADMIN — LORAZEPAM 2 MG: 2 TABLET ORAL at 21:18

## 2018-12-27 RX ADMIN — CLOZAPINE 275 MG: 100 TABLET ORAL at 21:18

## 2018-12-27 RX ADMIN — POLYETHYLENE GLYCOL 3350 17 G: 17 POWDER, FOR SOLUTION ORAL at 09:46

## 2018-12-27 RX ADMIN — LORAZEPAM 1 MG: 1 TABLET ORAL at 14:33

## 2018-12-27 RX ADMIN — DOCUSATE SODIUM 250 MG: 250 CAPSULE, LIQUID FILLED ORAL at 09:46

## 2018-12-27 ASSESSMENT — ACTIVITIES OF DAILY LIVING (ADL)
HYGIENE/GROOMING: INDEPENDENT
HYGIENE/GROOMING: PROMPTS;INDEPENDENT;SHOWER;HANDWASHING
LAUNDRY: UNABLE TO COMPLETE
DRESS: INDEPENDENT;PROMPTS
DRESS: INDEPENDENT
LAUNDRY: UNABLE TO COMPLETE
ORAL_HYGIENE: PROMPTS
ORAL_HYGIENE: PROMPTS;INDEPENDENT

## 2018-12-27 NOTE — PROGRESS NOTES
Pt slept all night only woke up once, used the bathroom and sat in the lounge for some time.  She was awake for about 40 minutes.  Staff changed bed linen during this time due to pt drooling through the night.

## 2018-12-27 NOTE — PLAN OF CARE
BEHAVIORAL TEAM DISCUSSION    Participants:   Missy Kiran MD Attending Psychiatrist  Jessica Lim MD PGY1  Vanda Weiss MSW, [facility] Clinical Treatment Coordinator  Domenico Snyder RN  Progress: progress towards functional goals has been minimal   Continued Stay Criteria/Rationale: acute symptoms of psychosis persist requiring ongoing medication management and clinical observation.   Medical/Physical: see  psychiatry physician progress note:   Precautions:   Behavioral Orders   Procedures    Code 1 - Restrict to Unit    Elopement precautions    Fall precautions    Routine Programming     As clinically indicated    Single Room    Status 15     Every 15 minutes.    Status Individual Observation     Order Specific Question:   CONTINUOUS 24 hours / day     Answer:   5 feet     Order Specific Question:   Indications for SIO     Answer:   Severe intrusiveness    Suicide precautions     Patients on Suicide Precautions should have a Combination Diet ordered that includes a Diet selection(s) AND a Behavioral Tray selection for Safe Tray - with utensils, or Safe Tray - NO utensils       Plan: continue with medication management per psychiatry. patient is committed with [facility] - discharge pending clinical stabilization.   Rationale for change in precautions or plan: per ongoing assessment.

## 2018-12-27 NOTE — PROGRESS NOTES
Pt was isolative and withdrawn to her room. Pt appeared tense and confused in the morning, appeared responding as she sat in the lounge next to a peer. Pt required redirection to maintain boundaries but was cooperative. Pt denies SI/SIB, hallucinations, and medication side affects. She had a good visit with her parents and showered in the morning. She needed prompting to wear clean scrubs after putting on her old pair after her shower. Pt was able to have a conversation with staff in the afternoon, spoke about going to school in [facility] [facility], stated she was unaware of the date. Pt affect appeared brighter in the afternoon, smiling, and worked on a wordfind.      12/27/18 1300   Behavioral Health   Hallucinations appears responding   Thinking distractable;poor concentration;paranoid   Orientation person: oriented;place: oriented   Memory baseline memory   Insight poor   Judgement impaired   Eye Contact at examiner   Affect tense;blunted, flat   Mood anxious   Physical Appearance/Attire attire appropriate to age and situation   Hygiene other (see comment)  (adequate)   Suicidality other (see comments)  (denies)   1. Wish to be Dead No   2. Non-Specific Active Suicidal Thoughts  No   Self Injury other (see comment)  (denies)   Activity withdrawn;isolative   Speech coherent   Medication Sensitivity no stated side effects;no observed side effects   Psychomotor / Gait balanced;steady   Activities of Daily Living   Hygiene/Grooming prompts;independent;shower;handwashing   Oral Hygiene prompts;independent   Dress independent;prompts   Laundry unable to complete   Room Organization independent

## 2018-12-27 NOTE — PROGRESS NOTES
"Pt was isolative and withdrawn to her room. When she would appear in the milieu she required frequent redirection to maintain boundaries around a specific male peer in the early evening. She would follow peer and attempt to approach them after they requested they wanted space. Pt stated \"He's not afraid\", and was difficult to redirect away from male peer. At one point pt attempted to enter the male pt's room and required multiple staff to redirect her. Pt refused dinner, appeared irritable, and tense. She continued to follow male peers in the late evening, staff had to step in between her and male peers continuously.        12/26/18 1900   Behavioral Health   Hallucinations appears responding   Thinking confused;distractable;paranoid;poor concentration   Orientation person: oriented;place: oriented   Memory confabulation   Insight poor   Judgement impaired   Eye Contact at examiner   Affect tense;blunted, flat;irritable   Mood irritable   Physical Appearance/Attire disheveled   Hygiene other (see comment)  (adequate )   1. Wish to be Dead No   2. Non-Specific Active Suicidal Thoughts  No   Activity withdrawn;refusal   Speech other (see comments)  (minimal speech)   Psychomotor / Gait balanced;steady   Activities of Daily Living   Hygiene/Grooming prompts   Oral Hygiene prompts   Dress prompts   Laundry unable to complete   Room Organization prompts     "

## 2018-12-27 NOTE — PROGRESS NOTES
"    ----------------------------------------------------------------------------------------------------------  [facility], [place]   Psychiatric Progress Note  Hospital Day #36     Interim History:   The patient's care was discussed with the treatment team and chart notes were reviewed.    Sleep: 6 hrs (woke up for 40 min in between)  Scheduled Medications: Received.  Staff Report: Withdrawn initially, appearing sedated. Had good visit again with family. However later was noted to follow a male peer, touch and even enter their room. Needed to be re-directed. Drooling persists. Tachycardic.    Patient Interview:   Patient was interviewed in her room. She was more alert today. She was sitting up, made better eye contact and looked more reactive, with improved reactivity. She said that she was feeling better. She did remember saying she wanted to  a doctor but she did not want to  one anymore. When her parents asked her she said, she is in a hospital so that's whys she shouldn't do it. She also did remember going into male peer's room, wanting to , but now she says she wouldn't do that. She says she is sorry. She has no complaints about the medications and will use a towel at night for drooling - not evident during interview. She also does not report palpitations, pulse examined still tachycardic. Parents feel she is 20% better. They are concerned that she appears to have memory lapses, which was discussed as part of the severity of illness. Other strategies like Lithium and ECT were discussed with patient and family if not improving. Family expressed desire to wait as she was improving. Patient for the first time said she needed help and had to be here and not go to work as she was \"not well\".  She smiled appropriately at the end of the interview today. Fluctuations have been noted by family as well and will need to monitor closely.    The risks, benefits, alternatives and " "side effects of any medication changes have been discussed and are understood by the patient and other caregivers.    Review of Systems:   See HPI for pertinent ROS.          Allergies:     Allergies   Allergen Reactions     Septra [Sulfamethoxazole W/Trimethoprim]             Psychiatric Examination:   /88   Pulse 124   Temp 98.8  F (37.1  C) (Oral)   Resp 20   Ht 1.702 m (5' 7\")   Wt 59.4 kg (131 lb)   SpO2 100%   BMI 20.52 kg/m    Weight is 131 lbs 0 oz  Body mass index is 20.52 kg/m .    Appearance: Awake, alert. Wearing hospital scrubs. Wearing a woolen cap.  Attitude:Co-operative.  Eye Contact: Fair.  Mood: \"Not well\"  Affect:  Blunt, but some reactivity today  Speech: Increased speech latency. Some paucity of speech. Decreased volume.   Psychomotor Behavior: Slow but fluid movement. No evidence of stereotypic movements, tardive dyskinesia, dystonia or tics. No tremor.   Thought Process: Illogical and confused.  Associations: No loose associations.  Thought Content: Unable to assess  Insight: Poor - improving  Judgment: Poor.  Attention Span and Concentration: Limited.  Recent and Remote Memory: Poor recent memory. Difficult to assess remote memory.  Language: Speaking fluent English with strong accent.  Fund of Knowledge: Difficult to assess.  Muscle Strength and Tone: Appearance is grossly normal, not obviously stiff. Mild EPS.  Gait and Station: normal appearing gait, unsteady/shuffling at times.     Labs:     Norclozapine Quant 163   ng/mL Final 12/21/2018  7:44 AM 13   Clozapine-N-Oxide Quant <100   ng/mL Final 12/21/2018  7:44 AM 13   Clozapine and Metabolites Total 572  <=1,500 ng/mL Final 12/21/2018  7:44 AM 13          Assessment    Diagnostic Impression: Ms. Davis is a 23 y.o. F with a psychiatric history of MDD with psychotic features who presented to the [facility] ED via EMS on 11/21/2018 due to psychosis in the setting of medication non-adherence. Her primary presentation is psychosis, as " evidenced by reported and witnessed visual and auditory hallucinations including command hallucinations taking the form of her father, delusions that her father is trying to poison her, stereotypic hand movements, writhing leg movements, echopraxia, and delayed speech latency. Demonstrates emotional lability from calm baseline, with rapid transition to and from sobbing or dancing/singing/laughing. Sleep maintenance is poor and fluctuating. Given her prior diagnosis of MDD with psychosis and her current presentation with acute psychosis in the absence of depressive sxs, and disorganized thinking, a thought disorder seems to be the most likely pathology. Ddx includes schizoaffective disorder and less likely bipolar depression or MDD with psychotic features. Current symptoms, along with initial improvement with lorazepam suggest component of catatonia to her current unspecified condition.     Hospital Course: Ms. Davis was admitted to [facility] on a 72 hour hold. Treatment team filed for commitment and [facility] which have been received. Olanzapine was started and uptitrated. PRN hydroxyzine and trazodone were initiated. There was some concern for catatonia given stereotypic movements and poor olanzapine response so she was given lorazepam with marked clinical improvement. Scheduled lorazepam was started. Not in full remission with olanzapine so attempted transition to aripiprazole which she has previously taken with good reported response, but she had recurrence of delusions, increased lability, new hyperactivity/agitation, and worsening sleep maintenance, so she is transitioning back to olanzapine. After demonstrating poor symptom improvement, clozapine was started and titrated while olanzapine was tapered.      Medical Course: CMP, lipid panel, TSH, B12/folate, CBC, sed rate, lyme, treponema, HIV, EMERALD, ceruloplasmin, ECG WNL. Vitamin D low. UA non-infectious. Demonstrated orthostasis, attributable to up-titration of  olanzapine. Repeat CBC w/ diff WNL. Bowel regimen initiated for constipation. Ipratropium spray started for sialorrhea. CMP WNL.    Plan   Principal Diagnosis:   # Unspecified Schizophrenia Spectrum and Other Psychotic Disorder  # R/O Catatonia    Psychotropic Medications:  Modify:  - Increased clozapine to 275 mg daily (12/26/18 )    Continue:  - Olanzapine stopped   - Haldol 5 mg Q4H PRN   - Diphenhydramine 50 mg Q4H PRN  - Lorazepam 2 mg at bedtime, 1 mg BID  - Cogentin 1 mg BID PRN for akathisia/dystonia  - Hydroxyzine 25 mg Q4H PRN   - Trazodone 50 mg at bedtime PRN  - Ipratropium nasal spray for sialorrhea  - [place] spray Q1H PRN for nasal congestion    Considering if continued poor response - scheduling haloperidol, adding Lithium for suspected mood-catatonia (excited stupor), review depression presentation- serotonin specific reuptake inhibitor trial or  ECT.    Patient will be treated in therapeutic milieu with appropriate individual and group therapies as described.    Medical diagnoses to be addressed this admission:    # Vitamin D Deficiency  - Continue ergocalciferol 50,000 Units Q7 days    # Constipation  - Miralax 17 g daily  - Docusate 250 mg daily  - Mylanta/Maalox QID PRN    # Headache/Neck Pain   - Ibuprofen 400 mg Q6H PRN  - Acetaminophen PRN  - Menthol Topical Analgesic Q6H PRN    # Sialorrhea  Secondary to clozapine. No improvement with sublingual ipratropium.  - Ipratropium nasal spray    # Nasal Congestion  - [place] nasal spray PRN    Data: As above.  Consults: None.    Legal Status: Commitment with [facility].    Safety Assessment:   Behavioral Orders   Procedures     Code 1 - Restrict to Unit     Elopement precautions     Fall precautions     Routine Programming     As clinically indicated     Single Room     Status 15     Every 15 minutes.     Status Individual Observation     Order Specific Question:   CONTINUOUS 24 hours / day     Answer:   5 feet     Order Specific Question:   Indications for  SIO     Answer:   Severe intrusiveness     Suicide precautions     Patients on Suicide Precautions should have a Combination Diet ordered that includes a Diet selection(s) AND a Behavioral Tray selection for Safe Tray - with utensils, or Safe Tray - NO utensils         Disposition: TBD pending clinical stabilization and medication optimization.     Patient was seen and discussed with the attending MD Jessica Samson MD,  PGY1 Psychiatry resident    Attestation:  This patient has been seen and evaluated by me, Missy Kiran.  I have discussed this patient with the psychiatry resident and I agree with the findings and plan in this note.    I have reviewed today's vital signs, medications, labs and imaging.   Missy Kiran MD.

## 2018-12-28 PROCEDURE — 25000132 ZZH RX MED GY IP 250 OP 250 PS 637: Performed by: STUDENT IN AN ORGANIZED HEALTH CARE EDUCATION/TRAINING PROGRAM

## 2018-12-28 PROCEDURE — 93010 ELECTROCARDIOGRAM REPORT: CPT | Performed by: INTERNAL MEDICINE

## 2018-12-28 PROCEDURE — 93005 ELECTROCARDIOGRAM TRACING: CPT

## 2018-12-28 PROCEDURE — 12400003 ZZH R&B MH CRITICAL UMMC

## 2018-12-28 PROCEDURE — 99232 SBSQ HOSP IP/OBS MODERATE 35: CPT | Mod: GC | Performed by: PSYCHIATRY & NEUROLOGY

## 2018-12-28 PROCEDURE — 25000132 ZZH RX MED GY IP 250 OP 250 PS 637: Performed by: PSYCHIATRY & NEUROLOGY

## 2018-12-28 RX ADMIN — DOCUSATE SODIUM 250 MG: 250 CAPSULE, LIQUID FILLED ORAL at 09:02

## 2018-12-28 RX ADMIN — ACETAMINOPHEN 650 MG: 325 TABLET, FILM COATED ORAL at 03:12

## 2018-12-28 RX ADMIN — LORAZEPAM 1 MG: 1 TABLET ORAL at 09:02

## 2018-12-28 RX ADMIN — LORAZEPAM 1 MG: 1 TABLET ORAL at 14:11

## 2018-12-28 RX ADMIN — CLOZAPINE 275 MG: 100 TABLET ORAL at 21:22

## 2018-12-28 RX ADMIN — LORAZEPAM 2 MG: 2 TABLET ORAL at 21:23

## 2018-12-28 RX ADMIN — POLYETHYLENE GLYCOL 3350 17 G: 17 POWDER, FOR SOLUTION ORAL at 09:02

## 2018-12-28 ASSESSMENT — ACTIVITIES OF DAILY LIVING (ADL)
DRESS: INDEPENDENT
HYGIENE/GROOMING: INDEPENDENT
ORAL_HYGIENE: INDEPENDENT

## 2018-12-28 NOTE — PLAN OF CARE
48 hour nursing assessment:  Pt evaluation continues. Assessed mood, anxiety, thoughts, and behavior. Is  very slowly progressing towards goals.  Dannie was encouraged to participate in groups  but was unable to do so. Spent part of the day pacing the freedman. The other part of the day was spent standing at the medication room door staring at the nurse repeatedly stating that the nurse was her mother. Was visited by her parents. Ate a HUGE meal of chicken and rice. Pt acknowledges  auditory and visual  hallucinations.Takes meds with a bit of encouragement.  Refer to daily team meeting notes for individualized plan of care. Will continue to assess.

## 2018-12-28 NOTE — PROVIDER NOTIFICATION
"Pt needing prompts for VS this evening. She declines any dinner and now declines evening snack; Pt denies any abdominal pain and hesitated when asked if she was using toilet to urinate. Pt accepting of at least 1000 ML of fluids likes apple/orange juice and drank a milk and then used bathroom. Takes medications with encouragement and time due to delayed responses; \"My Dad\" pt responds when asked if she has been eating. Evening staff inform writer that parents bring in food during day and pt eats good/large servings during day.  Nursing will continue to monitor closely, continue SIO for safety, encourage PO intake/fluids and monitor VS and assist as needed with clozaril titration.     12/27/18 2100   Vital Signs   Temp 98.5  F (36.9  C)   Temp src Tympanic   Resp 18   Pulse 130   Pulse/Heart Rate Source Monitor   /80   BP Location Right arm   Pain/Comfort   Patient Currently in Pain denies      "

## 2018-12-28 NOTE — PROGRESS NOTES
Pt remained in room much of shift.  Pt sat in lounge watching tv for 15 min, then returned to room.  Pt was not social with other pts or staff.  Pt did not want to eat their dinner or snack.  Pt did not receive any phone calls or visitors.  Pt denies SI and SIB.         12/27/18 1938   Behavioral Health   Hallucinations appears responding   Thinking distractable;poor concentration   Orientation person: oriented;place: oriented   Insight poor   Judgement impaired   Eye Contact at examiner   Affect tense;blunted, flat   Mood anxious;labile   Physical Appearance/Attire attire appropriate to age and situation   Hygiene other (see comment)  (adequate)   Suicidality other (see comments)  (Pt denies)   1. Wish to be Dead No   2. Non-Specific Active Suicidal Thoughts  No   Self Injury other (see comment)  (Pt denies)   Activity isolative;withdrawn   Speech pressured;coherent   Medication Sensitivity no observed side effects   Psychomotor / Gait balanced;steady   Psycho Education   Type of Intervention 1:1 intervention   Response participates with encouragement   Hours 0.5   Treatment Detail Check-in   Activities of Daily Living   Hygiene/Grooming independent   Oral Hygiene prompts   Dress independent   Laundry unable to complete   Room Organization independent

## 2018-12-28 NOTE — PROGRESS NOTES
Pt slept through the night, waking up once at 3am.  She used the bathroom, changed her scrubs, and writer changed her bed linen.  Pt was awake for 45 min and went back to sleep afterwards.  Pt continues to have heavy drooling.  Pule is still high.

## 2018-12-28 NOTE — PROGRESS NOTES
"    ----------------------------------------------------------------------------------------------------------  [facility], [place]   Psychiatric Progress Note  Hospital Day #37     Interim History:   The patient's care was discussed with the treatment team and chart notes were reviewed.    Sleep: 6.25 hrs  Scheduled Medications: Received.  Staff Report: Noted to be isolated and withdrawn initially but more bright and interactive as day progressed. No behavioral outbursts. Takes food provided by parents in the day more than food on tray at night. Adequate hydration. Needs sheets changed at night. Pulse in 130s.     Patient Interview:   Patient was interviewed in her room. She was awake and alert. She sat up and did not report feeling dizzy. She did not complain of heart palpitations and had been using a towel on pillow without major distress. She did not endorse any psychotic symptoms and said that she no longer wanted to  the doctor. She does also say that she is not well right now. She was worried about her hair (shortly cut) and how she was looking (may have had a weave before). Later in the milieu she appeared to cry, saying that one of the staff looked like/ reminded her of her mother. This mood lability has been noted before also.     The risks, benefits, alternatives and side effects of any medication changes have been discussed and are understood by the patient and other caregivers.    Review of Systems:   See HPI for pertinent ROS.          Allergies:     Allergies   Allergen Reactions     Septra [Sulfamethoxazole W/Trimethoprim]             Psychiatric Examination:   /80 (BP Location: Right arm)   Pulse 132   Temp 98.5  F (36.9  C) (Tympanic)   Resp 18   Ht 1.702 m (5' 7\")   Wt 59.4 kg (131 lb)   SpO2 99%   BMI 20.52 kg/m    Weight is 131 lbs 0 oz  Body mass index is 20.52 kg/m .    Appearance: Awake, alert. Wearing hospital scrubs. Wearing a woolen " "cap.  Attitude:Co-operative.  Eye Contact: Fair.  Mood: \"Not well\"  Affect:  Blunt, but some reactivity today  Speech: Increased speech latency. Some paucity of speech. Decreased volume.   Psychomotor Behavior: Slow but fluid movement. No evidence of stereotypic movements, tardive dyskinesia, dystonia or tics. No tremor.   Thought Process: Illogical and confused.  Associations: No loose associations.  Thought Content: Unable to assess  Insight: Poor - improving  Judgment: Poor.  Attention Span and Concentration: Limited.  Recent and Remote Memory: Poor recent memory. Difficult to assess remote memory.  Language: Speaking fluent English with strong accent.  Fund of Knowledge: Difficult to assess.  Muscle Strength and Tone: Appearance is grossly normal, not obviously stiff. Mild EPS.  Gait and Station: normal appearing gait, unsteady/shuffling at times.     Labs:     Norclozapine Quant 163   ng/mL Final 12/21/2018  7:44 AM 13   Clozapine-N-Oxide Quant <100   ng/mL Final 12/21/2018  7:44 AM 13   Clozapine and Metabolites Total 572  <=1,500 ng/mL Final 12/21/2018  7:44 AM 13          Assessment    Diagnostic Impression: Ms. Davis is a 23 y.o. F with a psychiatric history of MDD with psychotic features who presented to the [facility] ED via EMS on 11/21/2018 due to psychosis in the setting of medication non-adherence. Her primary presentation is psychosis, as evidenced by reported and witnessed visual and auditory hallucinations including command hallucinations taking the form of her father, delusions that her father is trying to poison her, stereotypic hand movements, writhing leg movements, echopraxia, and delayed speech latency. Demonstrates emotional lability from calm baseline, with rapid transition to and from sobbing or dancing/singing/laughing. Sleep maintenance is poor and fluctuating. Given her prior diagnosis of MDD with psychosis and her current presentation with acute psychosis in the absence of depressive sxs, " and disorganized thinking, a thought disorder seems to be the most likely pathology. Ddx includes schizoaffective disorder and less likely bipolar depression or MDD with psychotic features. Current symptoms, along with initial improvement with lorazepam suggest component of catatonia to her current unspecified condition.     Hospital Course: Ms. Davis was admitted to [facility] on a 72 hour hold. Treatment team filed for commitment and [facility] which have been received. Olanzapine was started and uptitrated. PRN hydroxyzine and trazodone were initiated. There was some concern for catatonia given stereotypic movements and poor olanzapine response so she was given lorazepam with marked clinical improvement. Scheduled lorazepam was started. Not in full remission with olanzapine so attempted transition to aripiprazole which she has previously taken with good reported response, but she had recurrence of delusions, increased lability, new hyperactivity/agitation, and worsening sleep maintenance, so she is transitioning back to olanzapine. After demonstrating poor symptom improvement, clozapine was started and titrated while olanzapine was tapered off. She was tachycardic in the 120s without symptoms and was closely followed up , with rpt EKG WNL.      Medical Course: CMP, lipid panel, TSH, B12/folate, CBC, sed rate, lyme, treponema, HIV, EMERALD, ceruloplasmin, ECG WNL. Vitamin D low. UA non-infectious. Demonstrated orthostasis, attributable to up-titration of olanzapine. Repeat CBC w/ diff WNL. Bowel regimen initiated for constipation. Ipratropium spray started for sialorrhea. CMP WNL.    Plan   Principal Diagnosis:   # Unspecified Schizophrenia Spectrum and Other Psychotic Disorder  # R/O Catatonia    Psychotropic Medications:  Modify:  - Increased clozapine to 275 mg daily (12/26/18 )    Continue:  - Olanzapine stopped   - Haldol 5 mg Q4H PRN   - Diphenhydramine 50 mg Q4H PRN  - Lorazepam 2 mg at bedtime, 1 mg BID  -  Cogentin 1 mg BID PRN for akathisia/dystonia  - Hydroxyzine 25 mg Q4H PRN   - Ocean spray Q1H PRN for nasal congestion    Considering if continued poor response - scheduling haloperidol, adding Lithium for suspected mood-catatonia (excited stupor), review depression presentation- serotonin specific reuptake inhibitor trial or  ECT.    Patient will be treated in therapeutic milieu with appropriate individual and group therapies as described.    Medical diagnoses to be addressed this admission:    # Vitamin D Deficiency  - Continue ergocalciferol 50,000 Units Q7 days    # Constipation  - Miralax 17 g daily  - Docusate 250 mg daily  - Mylanta/Maalox QID PRN    # Headache/Neck Pain   - Ibuprofen 400 mg Q6H PRN  - Acetaminophen PRN  - Menthol Topical Analgesic Q6H PRN    # Sialorrhea  Secondary to clozapine.   Strategies like use of towel helping  # Nasal Congestion  - Ocean nasal spray PRN    Data: As above.  Consults: None.    Legal Status: Commitment with [facility].    Safety Assessment:   Behavioral Orders   Procedures     Code 1 - Restrict to Unit     Elopement precautions     Fall precautions     Routine Programming     As clinically indicated     Single Room     Status 15     Every 15 minutes.     Status Individual Observation     Order Specific Question:   CONTINUOUS 24 hours / day     Answer:   5 feet     Order Specific Question:   Indications for SIO     Answer:   Severe intrusiveness     Suicide precautions     Patients on Suicide Precautions should have a Combination Diet ordered that includes a Diet selection(s) AND a Behavioral Tray selection for Safe Tray - with utensils, or Safe Tray - NO utensils         Disposition: TBD pending clinical stabilization and medication optimization.     Patient was seen and discussed with the attending MD Jessica Vail MD,  PGY1 Psychiatry resident    Psychiatry Attending Attestation:  This patient has been seen and evaluated by me, Tomi Li,  M.D.  The patient's condition and treatment plan were discussed with the resident, and care coordinated with the CTC and RN. I reviewed, edited and agree with the findings and plan in this note.     Tomi Li M.D.   of Psychiatry

## 2018-12-29 PROCEDURE — 25000132 ZZH RX MED GY IP 250 OP 250 PS 637: Performed by: STUDENT IN AN ORGANIZED HEALTH CARE EDUCATION/TRAINING PROGRAM

## 2018-12-29 PROCEDURE — 12400003 ZZH R&B MH CRITICAL UMMC

## 2018-12-29 PROCEDURE — 25000132 ZZH RX MED GY IP 250 OP 250 PS 637: Performed by: PSYCHIATRY & NEUROLOGY

## 2018-12-29 RX ADMIN — LORAZEPAM 1 MG: 1 TABLET ORAL at 09:52

## 2018-12-29 RX ADMIN — LORAZEPAM 2 MG: 2 TABLET ORAL at 21:23

## 2018-12-29 RX ADMIN — POLYETHYLENE GLYCOL 3350 17 G: 17 POWDER, FOR SOLUTION ORAL at 09:52

## 2018-12-29 RX ADMIN — DOCUSATE SODIUM 250 MG: 250 CAPSULE, LIQUID FILLED ORAL at 09:52

## 2018-12-29 RX ADMIN — CLOZAPINE 275 MG: 100 TABLET ORAL at 21:22

## 2018-12-29 ASSESSMENT — ACTIVITIES OF DAILY LIVING (ADL)
DRESS: INDEPENDENT
ORAL_HYGIENE: INDEPENDENT
LAUNDRY: UNABLE TO COMPLETE
HYGIENE/GROOMING: INDEPENDENT

## 2018-12-29 NOTE — PROGRESS NOTES
12/28/18 2300   Behavioral Health   Hallucinations denies / not responding to hallucinations   Thinking distractable;poor concentration   Orientation person: oriented   Memory other (see comment)  (SALAS)   Insight poor   Judgement impaired   Eye Contact at examiner   Affect blunted, flat;sad   Mood anxious   Physical Appearance/Attire disheveled   Hygiene neglected grooming - unclean body, hair, teeth   Suicidality (SALAS)   1. Wish to be Dead (SALAS)   2. Non-Specific Active Suicidal Thoughts  (SALAS)   Self Injury other (see comment)  (not seen)   Elopement (no value)   Activity isolative;withdrawn   Speech pressured;incoherent   Medication Sensitivity sedation   Psychomotor / Gait balanced;steady   Activities of Daily Living   Hygiene/Grooming independent   Oral Hygiene independent   Dress independent   Room Organization independent         Patient is calm, but isolative and a bit anxious. Pt is decompensating and is getting worse. Pt is not intrusive but she is not responsive. SALAS to assess most mental health symptoms or SI and SIB thoughts/

## 2018-12-29 NOTE — PROGRESS NOTES
"   12/28/18 2300   Behavioral Health   Hallucinations denies / not responding to hallucinations   Thinking distractable;poor concentration   Orientation person: oriented   Memory other (see comment)  (SALAS)   Insight poor   Judgement impaired   Eye Contact at examiner   Affect blunted, flat;sad   Mood anxious   Physical Appearance/Attire disheveled   Hygiene neglected grooming - unclean body, hair, teeth   Suicidality (SALAS)   1. Wish to be Dead (SALAS)   2. Non-Specific Active Suicidal Thoughts  (SALAS)   Self Injury other (see comment)  (not seen)   Elopement (no value)   Activity isolative;withdrawn   Speech pressured;incoherent   Medication Sensitivity sedation   Psychomotor / Gait balanced;steady   Activities of Daily Living   Hygiene/Grooming independent   Oral Hygiene independent   Dress independent   Room Organization independent     Pt has been in and out of her room this shift. Affect has been down/flat. Behavior is calm and controlled. Pt's gait is slow but steady. Pt was observed to attended focus group, paced the halls briefly, and rested in her room. Pt expressed her goal is to get in touch with her parents. When asked if she has thoughts to hurt herself or other, pt said, \"no.\" Pt denies hearing voices. Contracts for safety.   "

## 2018-12-30 PROCEDURE — 25000132 ZZH RX MED GY IP 250 OP 250 PS 637: Performed by: STUDENT IN AN ORGANIZED HEALTH CARE EDUCATION/TRAINING PROGRAM

## 2018-12-30 PROCEDURE — 25000132 ZZH RX MED GY IP 250 OP 250 PS 637: Performed by: PSYCHIATRY & NEUROLOGY

## 2018-12-30 PROCEDURE — 12400003 ZZH R&B MH CRITICAL UMMC

## 2018-12-30 PROCEDURE — G0177 OPPS/PHP; TRAIN & EDUC SERV: HCPCS

## 2018-12-30 RX ORDER — HALOPERIDOL 5 MG/1
5 TABLET ORAL EVERY 6 HOURS PRN
Status: DISCONTINUED | OUTPATIENT
Start: 2018-12-30 | End: 2018-12-30

## 2018-12-30 RX ORDER — HALOPERIDOL 5 MG/1
5 TABLET ORAL
Status: DISCONTINUED | OUTPATIENT
Start: 2018-12-30 | End: 2019-01-18 | Stop reason: HOSPADM

## 2018-12-30 RX ADMIN — DOCUSATE SODIUM 250 MG: 250 CAPSULE, LIQUID FILLED ORAL at 08:41

## 2018-12-30 RX ADMIN — BENZTROPINE MESYLATE 1 MG: 1 TABLET ORAL at 17:15

## 2018-12-30 RX ADMIN — LORAZEPAM 1 MG: 1 TABLET ORAL at 13:23

## 2018-12-30 RX ADMIN — CLOZAPINE 275 MG: 100 TABLET ORAL at 21:06

## 2018-12-30 RX ADMIN — ACETAMINOPHEN 650 MG: 325 TABLET, FILM COATED ORAL at 04:14

## 2018-12-30 RX ADMIN — LORAZEPAM 1 MG: 1 TABLET ORAL at 08:42

## 2018-12-30 RX ADMIN — POLYETHYLENE GLYCOL 3350 17 G: 17 POWDER, FOR SOLUTION ORAL at 08:41

## 2018-12-30 RX ADMIN — LORAZEPAM 2 MG: 2 TABLET ORAL at 21:06

## 2018-12-30 RX ADMIN — HALOPERIDOL 5 MG: 5 TABLET ORAL at 10:24

## 2018-12-30 ASSESSMENT — ACTIVITIES OF DAILY LIVING (ADL)
HYGIENE/GROOMING: INDEPENDENT
DRESS: INDEPENDENT
LAUNDRY: UNABLE TO COMPLETE
ORAL_HYGIENE: INDEPENDENT
DRESS: INDEPENDENT
DRESS: INDEPENDENT
ORAL_HYGIENE: INDEPENDENT
HYGIENE/GROOMING: INDEPENDENT
ORAL_HYGIENE: INDEPENDENT
HYGIENE/GROOMING: INDEPENDENT
LAUNDRY: WITH SUPERVISION

## 2018-12-30 ASSESSMENT — MIFFLIN-ST. JEOR: SCORE: 1367.77

## 2018-12-30 NOTE — PROGRESS NOTES
"  -----------------------------------------------------------------------------------------------------------  Psychiatry Cross Cover note      Dannie Davis MRN# [id number]   Age: 23 year old YOB: 1995   38    Staff reported that patient was stating that she was \"losing her mind\" and was requesting PRN Zyprexa. Per chart review, Zyprexa was discontinued due to orthostasis, but patient does have Haldol IM PRN for agitation. Patient is walking, talking, eating, and drinking, and it seems that the prominent catatonic symptoms she demonstrated on admission have improved. Currently risk of orthostasis with Zyprexa, particularly in combination with Clozaril, seems greater than risk of precipitating catatonia with one-time dose of Haldol. Notably, patient continues to take a total of 4 mg of Ativan per day for catatonic symptoms.    -Haldol 5 mg PO once PRN for agitation  -Defer further selection of PRNs to primary team    =============================================================================  Ashli Renae MD  PGY-2 Psychiatry Resident      "

## 2018-12-30 NOTE — PROGRESS NOTES
"Pt started the shift asleep and appeared asleep until 0400 when a loud intercom page went off on the unit.  Pt suddenly got up and was questioning what was going on.  Pt was reassured everything was fine and was encouraged to try and get some more sleep.  Pt walking around her room and lounge at this time.  Pt's bed linens and pillowcases were wet from drooling and were therefore changed.  Pt received PRN Tylenol at 0414 for \"my body is hurting\"--pt unable to elaborate further.  Pt sang in her room for a short while and then appeared back to sleep at approx 0445. Pt continues to appear asleep at this time. SIO 1:1 remains ongoing for safety.        "

## 2018-12-30 NOTE — PLAN OF CARE
Dannie  attended an OT discussion group this afternoon in which participants identified the positive developments in their life from 2018. Affect quite flat with occasional smiles. Required some assistance in brainstorming the positive developments from her recent past. The examples she identified were bizarre and loose. [place] through the session she began singing Sikhism songs with a lot of feeling. Made eye contact with everyone present in the room while she sang. Tender and vulnerable at this time. Appears very internally preoccupied. Affect mostly vacant.

## 2018-12-30 NOTE — PROGRESS NOTES
Pt spent the shift going back and fourth between sitting in the lounge and laying in bed. Pt was mostly mute but would respond to some questions with one-word-answers. Pt showered this morning and needed some verbal prompting from staff to complete, mostly independent. Flat affect and ate minimally.     12/30/18 1400   Behavioral Health   Hallucinations denies / not responding to hallucinations   Thinking poor concentration   Orientation date: oriented;time: oriented;place: oriented;person: oriented   Memory (alina)   Insight poor   Judgement impaired   Eye Contact at examiner   Affect blunted, flat   Mood mood is calm   Physical Appearance/Attire disheveled   Hygiene neglected grooming - unclean body, hair, teeth   Suicidality other (see comments)  (Denied)   1. Wish to be Dead No   2. Non-Specific Active Suicidal Thoughts  No   Self Injury other (see comment)  (Denied)   Activity withdrawn;isolative   Speech mute   Medication Sensitivity no observed side effects   Psychomotor / Gait steady;slow   Psycho Education   Type of Intervention 1:1 intervention   Response participates, initiates socially appropriate   Hours 0.5   Treatment Detail Check in   Activities of Daily Living   Hygiene/Grooming independent   Oral Hygiene independent   Dress independent   Laundry with supervision   Room Organization independent

## 2018-12-30 NOTE — PROGRESS NOTES
Pt was isolative and withdrawn in her room, lying in bed. Pt is cooperative with staff but interacts minimally, with delayed responses to questions and one-word answers. Pt's parents visited this evening, and she ate dinner with them in the dining area. No behavioral incidents to report this shift.       12/29/18 2143   Behavioral Health   Hallucinations other (see comment)  (alina)   Thinking poor concentration;distractable   Orientation person: oriented   Memory other (see comment)  (alina)   Insight poor   Judgement impaired   Eye Contact at examiner;staring   Affect blunted, flat   Mood mood is calm   Physical Appearance/Attire disheveled   Hygiene neglected grooming - unclean body, hair, teeth   Suicidality other (see comments)  (none stated)   1. Wish to be Dead No   2. Non-Specific Active Suicidal Thoughts  No   Self Injury other (see comment)  (none stated)   Elopement (none)   Activity withdrawn;isolative   Speech other (see comments)  (minimal interaction, one word answer, delayed)   Psychomotor / Gait balanced;steady;slow   Psycho Education   Type of Intervention 1:1 intervention   Response participates, initiates socially appropriate   Hours 0.5   Treatment Detail check in   Activities of Daily Living   Hygiene/Grooming independent   Oral Hygiene independent   Dress independent   Laundry unable to complete   Room Organization independent

## 2018-12-31 LAB — INTERPRETATION ECG - MUSE: NORMAL

## 2018-12-31 PROCEDURE — 99232 SBSQ HOSP IP/OBS MODERATE 35: CPT | Mod: GC | Performed by: PSYCHIATRY & NEUROLOGY

## 2018-12-31 PROCEDURE — 25000132 ZZH RX MED GY IP 250 OP 250 PS 637: Performed by: STUDENT IN AN ORGANIZED HEALTH CARE EDUCATION/TRAINING PROGRAM

## 2018-12-31 PROCEDURE — 12400003 ZZH R&B MH CRITICAL UMMC

## 2018-12-31 PROCEDURE — H2032 ACTIVITY THERAPY, PER 15 MIN: HCPCS

## 2018-12-31 PROCEDURE — 25000132 ZZH RX MED GY IP 250 OP 250 PS 637: Performed by: PSYCHIATRY & NEUROLOGY

## 2018-12-31 RX ORDER — LITHIUM CARBONATE 300 MG/1
600 TABLET, FILM COATED, EXTENDED RELEASE ORAL AT BEDTIME
Status: DISCONTINUED | OUTPATIENT
Start: 2018-12-31 | End: 2019-01-06

## 2018-12-31 RX ADMIN — LORAZEPAM 1 MG: 1 TABLET ORAL at 14:50

## 2018-12-31 RX ADMIN — LORAZEPAM 1 MG: 1 TABLET ORAL at 08:31

## 2018-12-31 RX ADMIN — DOCUSATE SODIUM 250 MG: 250 CAPSULE, LIQUID FILLED ORAL at 08:31

## 2018-12-31 RX ADMIN — CLOZAPINE 275 MG: 100 TABLET ORAL at 22:08

## 2018-12-31 RX ADMIN — LITHIUM CARBONATE 600 MG: 300 TABLET, EXTENDED RELEASE ORAL at 22:09

## 2018-12-31 RX ADMIN — ACETAMINOPHEN 650 MG: 325 TABLET, FILM COATED ORAL at 05:24

## 2018-12-31 RX ADMIN — LORAZEPAM 2 MG: 2 TABLET ORAL at 22:08

## 2018-12-31 RX ADMIN — POLYETHYLENE GLYCOL 3350 17 G: 17 POWDER, FOR SOLUTION ORAL at 08:31

## 2018-12-31 ASSESSMENT — ACTIVITIES OF DAILY LIVING (ADL)
HYGIENE/GROOMING: INDEPENDENT
DRESS: SCRUBS (BEHAVIORAL HEALTH)
DRESS: SCRUBS (BEHAVIORAL HEALTH)
ORAL_HYGIENE: INDEPENDENT
ORAL_HYGIENE: INDEPENDENT
HYGIENE/GROOMING: INDEPENDENT
LAUNDRY: WITH SUPERVISION

## 2018-12-31 NOTE — PROGRESS NOTES
Pt withdrawn and mostly isolative to her room this evening, did attend community meeting, participated minimally. Pt is calm, flat affect. No behavioral concerns to be noted this shift.       12/30/18 2039   Behavioral Health   Hallucinations denies / not responding to hallucinations   Thinking poor concentration;distractable   Orientation person: oriented;place: oriented   Memory other (see comment)  (alina)   Insight poor   Judgement impaired   Eye Contact at examiner   Affect blunted, flat   Mood mood is calm   Physical Appearance/Attire disheveled   Hygiene neglected grooming - unclean body, hair, teeth   Suicidality other (see comments)  (denies)   1. Wish to be Dead No   2. Non-Specific Active Suicidal Thoughts  No   Self Injury other (see comment)  (denies)   Elopement (none)   Activity withdrawn;isolative   Speech other (see comments)  (slow to respond)   Psychomotor / Gait slow   Psycho Education   Type of Intervention 1:1 intervention   Response participates with cues/redirection   Hours 0.5   Treatment Detail check in   Activities of Daily Living   Hygiene/Grooming independent   Oral Hygiene independent   Dress independent   Laundry unable to complete   Room Organization independent

## 2018-12-31 NOTE — PROGRESS NOTES
Pt had a fairly good night.  Pt was up till 0200, finally slept after she had concepcion crackers and milk..  Pt got up at 0430 paced the hallways for a little bit and went back to sleep without any concerns.  Pt's scrub top was changed due to excessive drooling.  Pt is currently sleeping.  Will continue to monitor and offer support as needed.

## 2018-12-31 NOTE — PROGRESS NOTES
"Attended 2 of 2 music therapy groups. Interventions focused on improving reality orientation and mood. Pt observed music game and would occasionally answer a question from writer with a \"yes\" or \"no.\" When speaking, pt would mumble. Pt sang along with preferred and familiar music while listening. Minimal interactions with peers. Eleazar an apple and a bird during music and art intervention. Cooperative.   "

## 2018-12-31 NOTE — PROGRESS NOTES
"    ----------------------------------------------------------------------------------------------------------  [facility], [place]   Psychiatric Progress Note  Hospital Day #40     Interim History:   The patient's care was discussed with the treatment team and chart notes were reviewed.    Sleep: 6 hrs  Scheduled Medications: Received.  Interval history:  She has been in bed most of the day, with intermittently sleeping through the night. Still drooling, no concerns with palpitations or chest pain. She has been inappropriate at times, in groups speech is \"loose\" and unconnected. Also goes into singing Shinto songs and exhibiting dramatic swings in affect.   Patient was interviewed in her room. She was awake and alert. She was watching TV in the milieu when she was interviewed. She did not report any current psychotic symptoms. She denied hearing voices and did not have any thought phenomenon at this time. She said that she no longer wanted to  a doctor. She did say that she wanted to go home, but also get better first. She was concerned about her appearance and said she wanted \"perfect hair\".  She declined when asked if she wanted her weave in back. Her mood appeared to be labile at times, with her starting to sing a hymn. She continued to sing, and despite suggestions to stop, she continued singing. Interviewer discussed Lithium with the patient. She did not have any adverse effects with any of her current medications, and was okay with starting Lithium.     The risks, benefits, alternatives and side effects of any medication changes have been discussed and are understood by the patient and other caregivers.    Review of Systems:   See HPI for pertinent ROS.          Allergies:     Allergies   Allergen Reactions     Septra [Sulfamethoxazole W/Trimethoprim]             Psychiatric Examination:   /88   Pulse 128   Temp 98.6  F (37  C) (Oral)   Resp 16   Ht 1.702 m (5' " "7\")   Wt 58.5 kg (129 lb)   SpO2 100%   BMI 20.20 kg/m    Weight is 129 lbs 0 oz  Body mass index is 20.2 kg/m .    Appearance: Awake, alert. Wearing hospital scrubs. Wearing a woolen cap.  Attitude:Co-operative.  Eye Contact: Fair.  Mood: \"Not well\"  Affect:  Blunt, but some reactivity today  Speech: Sings song with increased verbiosity and does not stop when asked .   Psychomotor Behavior: Slow but fluid movement. No evidence of stereotypic movements, tardive dyskinesia, dystonia or tics. No tremor.   Thought Process: Illogical and confused.  Associations: No loose associations.  Thought Content: Unable to assess  Insight: Poor - improving  Judgment: Poor.  Attention Span and Concentration: Limited.  Recent and Remote Memory: Poor recent memory. Difficult to assess remote memory.  Language: Speaking fluent English with strong accent.  Fund of Knowledge: Difficult to assess.  Muscle Strength and Tone: Appearance is grossly normal, not obviously stiff. Mild EPS.  Gait and Station: normal appearing gait, unsteady/shuffling at times.     Labs:     Norclozapine Quant 163   ng/mL Final 12/21/2018  7:44 AM 13   Clozapine-N-Oxide Quant <100   ng/mL Final 12/21/2018  7:44 AM 13   Clozapine and Metabolites Total 572  <=1,500 ng/mL Final 12/21/2018  7:44 AM 13          Assessment    Diagnostic Impression: Ms. Davis is a 23 y.o. F with a psychiatric history of MDD with psychotic features who presented to the [facility] ED via EMS on 11/21/2018 due to psychosis in the setting of medication non-adherence. Her primary presentation is psychosis, as evidenced by reported and witnessed visual and auditory hallucinations including command hallucinations taking the form of her father, delusions that her father is trying to poison her, stereotypic hand movements, writhing leg movements, echopraxia, and delayed speech latency. Demonstrates emotional lability from calm baseline, with rapid transition to and from sobbing or " dancing/singing/laughing. Sleep maintenance is poor and fluctuating. Given her prior diagnosis of MDD with psychosis and her current presentation with acute psychosis in the absence of depressive sxs, and disorganized thinking, a thought disorder seems to be the most likely pathology. Ddx includes schizoaffective disorder and less likely bipolar depression or MDD with psychotic features. Current symptoms, along with initial improvement with lorazepam suggest component of catatonia to her current unspecified condition.     Hospital Course: Ms. Davis was admitted to [facility] on a 72 hour hold. Treatment team filed for commitment and Gu[facility]ro which have been received. Olanzapine was started and uptitrated. PRN hydroxyzine and trazodone were initiated. There was some concern for catatonia given stereotypic movements and poor olanzapine response so she was given lorazepam with marked clinical improvement. Scheduled lorazepam was started. Not in full remission with olanzapine so attempted transition to aripiprazole which she has previously taken with good reported response, but she had recurrence of delusions, increased lability, new hyperactivity/agitation, and worsening sleep maintenance, so she is transitioning back to olanzapine. After demonstrating poor symptom improvement, clozapine was started and titrated while olanzapine was tapered off. She was tachycardic in the 120s without symptoms and was closely followed up , with rpt EKG WNL. With background mood lability, hyper-religiosity, periods of psychomotor agitation, Lithium was started on 12/31/18.     Medical Course: CMP, lipid panel, TSH, B12/folate, CBC, sed rate, lyme, treponema, HIV, EMERALD, ceruloplasmin, ECG WNL. Vitamin D low. UA non-infectious. Demonstrated orthostasis, attributable to up-titration of olanzapine. Repeat CBC w/ diff WNL. Bowel regimen initiated for constipation. Ipratropium spray started for sialorrhea. CMP WNL.    Plan   Principal  Diagnosis:   # Unspecified Schizophrenia Spectrum and Other Psychotic Disorder  # R/O Catatonia    Psychotropic Medications:  Modify:  - Increased Clozapine to 275 mg daily (12/26/18 )  - Lithium 600 mg a day    Continue:  - Olanzapine stopped   - Haldol 5 mg Q4H PRN   - Diphenhydramine 50 mg Q4H PRN  - Lorazepam 2 mg at bedtime, 1 mg BID  - Cogentin 1 mg BID PRN for akathisia/dystonia  - Hydroxyzine 25 mg Q4H PRN   - Ocean spray Q1H PRN for nasal congestion    Considering if continued poor response - scheduling haloperidol, adding Lithium for suspected mood-catatonia (excited stupor), review depression presentation- serotonin specific reuptake inhibitor trial or  ECT.    Patient will be treated in therapeutic milieu with appropriate individual and group therapies as described.    Medical diagnoses to be addressed this admission:    # Vitamin D Deficiency  - Continue ergocalciferol 50,000 Units Q7 days    # Constipation  - Miralax 17 g daily  - Docusate 250 mg daily  - Mylanta/Maalox QID PRN    # Headache/Neck Pain   - Ibuprofen 400 mg Q6H PRN  - Acetaminophen PRN  - Menthol Topical Analgesic Q6H PRN    # Sialorrhea  Secondary to clozapine.   Strategies like use of towel helping  # Nasal Congestion  - Ocean nasal spray PRN    Data: As above.  Consults: None.    Legal Status: Commitment with [facility].    Safety Assessment:   Behavioral Orders   Procedures     Code 1 - Restrict to Unit     Elopement precautions     Fall precautions     Routine Programming     As clinically indicated     Single Room     Status 15     Every 15 minutes.     Status Individual Observation     Order Specific Question:   CONTINUOUS 24 hours / day     Answer:   5 feet     Order Specific Question:   Indications for SIO     Answer:   Severe intrusiveness     Suicide precautions     Patients on Suicide Precautions should have a Combination Diet ordered that includes a Diet selection(s) AND a Behavioral Tray selection for Safe Tray - with utensils,  or Safe Tray - NO utensils         Disposition: TBD pending clinical stabilization and medication optimization.     Patient was seen and discussed with the attending MD Jessica Dick MD,  PGY1 Psychiatry resident    Psychiatry Attending Attestation:    This patient has been seen and evaluated by me, Mauro Vann.  I have discussed this patient with the house staff team including the resident and medical student and I agree with the findings and plan in this note.    I have reviewed today's vital signs, medications, labs and imaging. aMuro Vann MD

## 2018-12-31 NOTE — PLAN OF CARE
Dannie is quiet, delayed in speech and will often 'come to attention' after repeat of question to her two or three times.  She answered after prompting when asked if she wanted another glass of water.  She sang along with a song she knew in music therapy without prompting and knowing the words.  Her face is more circulated looking, more light coming off of her sking and she is less intrusive today.  Has not tried the doors or attempted to leave.  She has not been too close to any peer physically - having better boundaries.  Improvement noticed.

## 2018-12-31 NOTE — PROGRESS NOTES
Pt appears to have slept 4 hrs this shift.  Pt received PRN Tylenol at 0524 for Rt arm and neck pain.  SIO 1:1 remains ongoing for safety.  Will continue to monitor and support plan or care.

## 2018-12-31 NOTE — PROGRESS NOTES
12/30/18 4444   Significant Event   Significant Event Other (see comments)  (shift summary)     Pt was sleeping on and off during the shift, pt occasionally came out of her room and watched TV. Calm, cooperative and withdrawn to self.  No stated SI, SIB or hallucinations..

## 2019-01-01 PROCEDURE — H2032 ACTIVITY THERAPY, PER 15 MIN: HCPCS

## 2019-01-01 PROCEDURE — 25000132 ZZH RX MED GY IP 250 OP 250 PS 637: Performed by: PSYCHIATRY & NEUROLOGY

## 2019-01-01 PROCEDURE — 25000132 ZZH RX MED GY IP 250 OP 250 PS 637: Performed by: STUDENT IN AN ORGANIZED HEALTH CARE EDUCATION/TRAINING PROGRAM

## 2019-01-01 PROCEDURE — G0177 OPPS/PHP; TRAIN & EDUC SERV: HCPCS

## 2019-01-01 PROCEDURE — 12400001 ZZH R&B MH UMMC

## 2019-01-01 RX ORDER — IPRATROPIUM BROMIDE 21 UG/1
1 SPRAY, METERED NASAL AT BEDTIME
Status: DISCONTINUED | OUTPATIENT
Start: 2019-01-01 | End: 2019-01-07

## 2019-01-01 RX ADMIN — CLOZAPINE 275 MG: 100 TABLET ORAL at 21:12

## 2019-01-01 RX ADMIN — IPRATROPIUM BROMIDE 1 SPRAY: 21 SPRAY NASAL at 21:12

## 2019-01-01 RX ADMIN — DOCUSATE SODIUM 250 MG: 250 CAPSULE, LIQUID FILLED ORAL at 09:11

## 2019-01-01 RX ADMIN — POLYETHYLENE GLYCOL 3350 17 G: 17 POWDER, FOR SOLUTION ORAL at 09:11

## 2019-01-01 RX ADMIN — LITHIUM CARBONATE 600 MG: 300 TABLET, EXTENDED RELEASE ORAL at 21:12

## 2019-01-01 RX ADMIN — LORAZEPAM 1 MG: 1 TABLET ORAL at 09:11

## 2019-01-01 RX ADMIN — LORAZEPAM 2 MG: 2 TABLET ORAL at 21:13

## 2019-01-01 RX ADMIN — LORAZEPAM 1 MG: 1 TABLET ORAL at 14:26

## 2019-01-01 ASSESSMENT — ACTIVITIES OF DAILY LIVING (ADL)
DRESS: SCRUBS (BEHAVIORAL HEALTH)
HYGIENE/GROOMING: INDEPENDENT
LAUNDRY: WITH SUPERVISION
DRESS: INDEPENDENT
ORAL_HYGIENE: INDEPENDENT
ORAL_HYGIENE: INDEPENDENT
LAUNDRY: UNABLE TO COMPLETE
HYGIENE/GROOMING: INDEPENDENT

## 2019-01-01 NOTE — PROGRESS NOTES
"Pt was observed withdrawn to their room for most of the shift, only coming out to get water in the dinning area. Pt spent most of their time in their room laying on their bed sleeping or sitting on the bed eating snacks. During check-in with writer pt denied experiencing hallucinations, as well as SI and SIB. Pt was able to identify that they are at the hospital because they are \"confused\" and also correctly stated what day it is. When asked if they were feeling any anxiety or depression pt sternly stated \"I depressed\". When pt was asked what was making them feel depressed, they shrugged indicating they didn't know what was making them feel depressed. Pt was later visited by their family who brought them food which they ate. Pt was overheard singing and appeared to be cheerful with their visitors.       12/31/18 2200   Behavioral Health   Hallucinations appears responding   Thinking confused   Orientation place: oriented;date: oriented;situation, disoriented;person: oriented   Memory (SALAS)   Insight poor   Judgement impaired   Eye Contact at examiner   Affect blunted, flat;sad   Mood depressed;mood is calm   Physical Appearance/Attire attire appropriate to age and situation   Hygiene well groomed   Suicidality (Pt denies)   1. Wish to be Dead No   2. Non-Specific Active Suicidal Thoughts  No   Activity withdrawn;isolative   Speech clear;coherent   Medication Sensitivity sedation   Psychomotor / Gait balanced;steady   Psycho Education   Type of Intervention 1:1 intervention   Response participates with cues/redirection   Hours 0.5   Treatment Detail Check-in   Activities of Daily Living   Hygiene/Grooming independent   Oral Hygiene independent   Dress scrubs (behavioral health)   Room Organization independent     "

## 2019-01-01 NOTE — PROGRESS NOTES
"Pt participated in dance/movement therapy (DMT) with a wide range of non-verbal and some verbal expression.  Pt sang lyrics to the songs and joined group images (for example, swimming, or bringing in new things for the New Year.)  Pt initially mirrored and focused her gaze on this therapist, but gradually, the pt interactions expanded to other patients as well.  Pt explored space with others in a playful forward-and-back dance.  It was noted that she more accurately perceived the comfortable social boundaries of others in the session and responded respectfully.  Her movement and singing were rhythmic, expressive and organized.  Energy-level was also varied, she became vitalized at times, calm at other times, all in socially-appropriate ways.      After the session, pt stated she was \"tired\" and was seen returning to her room.  When this therapist attempted to re-engage patient, she had returned to a less-engaged, more non-verbally closed down posture.  She was again less responsive and less personally expressive.    "

## 2019-01-01 NOTE — PROGRESS NOTES
Patient refused 0800 VS's. Allowed writer to check temperature and pulse. Temperature was 99.6 Tympanic.  At 1100 VS's were taken. Tympanic temperature was 99.7 and oral was 99. Pulse in 120's and 130's. Respirations were 24 at 1100 and at 1600. O2 sats were 100%. Patient denies SOB, rajiv feeling sick in any way. Does c/o flank pain which could be muscle. Dr. Rodríguez notified and said continue to watch patient closely.

## 2019-01-01 NOTE — PROGRESS NOTES
Pt continued on SIO 1:1 for safety;  intermittently up for short periods from sleep; no drooling noted this shift; during awake time, pt noted getting in and out of the bathroom and voided once; declined pain or discomfort; no sign of responding to internal stimuli noted; pt get out of her room at least three times to grab ice water and noted drinking much of it. Pt slept for 5 hours this shift. Pt required redirection at times to remain in the lunge area to maintain therapeutic quite environment for other patients on the unit. No safety concern noted this shift, and staff will continue monitoring pt safety as ordered.

## 2019-01-01 NOTE — PROGRESS NOTES
Patient c/o nighttime drooling to day staff, requesting medication to help. Clozapine-induced sialorrhea. Per review of chart, have been attempting to manage nonpharmacologically. Will trial a scheduled medication this evening given patient request.     - Add Atrovent 0.03% 1 spray sublingual at bedtime    Raad Rodríguez, PGY2

## 2019-01-02 LAB
BASOPHILS # BLD AUTO: 0 10E9/L (ref 0–0.2)
BASOPHILS NFR BLD AUTO: 0.2 %
DIFFERENTIAL METHOD BLD: NORMAL
EOSINOPHIL # BLD AUTO: 0.2 10E9/L (ref 0–0.7)
EOSINOPHIL NFR BLD AUTO: 3.8 %
ERYTHROCYTE [DISTWIDTH] IN BLOOD BY AUTOMATED COUNT: 12.7 % (ref 10–15)
HCT VFR BLD AUTO: 37.6 % (ref 35–47)
HGB BLD-MCNC: 12.7 G/DL (ref 11.7–15.7)
IMM GRANULOCYTES # BLD: 0 10E9/L (ref 0–0.4)
IMM GRANULOCYTES NFR BLD: 0.2 %
LYMPHOCYTES # BLD AUTO: 1.9 10E9/L (ref 0.8–5.3)
LYMPHOCYTES NFR BLD AUTO: 35.7 %
MCH RBC QN AUTO: 26.8 PG (ref 26.5–33)
MCHC RBC AUTO-ENTMCNC: 33.8 G/DL (ref 31.5–36.5)
MCV RBC AUTO: 79 FL (ref 78–100)
MONOCYTES # BLD AUTO: 0.4 10E9/L (ref 0–1.3)
MONOCYTES NFR BLD AUTO: 7 %
NEUTROPHILS # BLD AUTO: 2.8 10E9/L (ref 1.6–8.3)
NEUTROPHILS NFR BLD AUTO: 53.1 %
NRBC # BLD AUTO: 0 10*3/UL
NRBC BLD AUTO-RTO: 0 /100
PLATELET # BLD AUTO: 212 10E9/L (ref 150–450)
RBC # BLD AUTO: 4.74 10E12/L (ref 3.8–5.2)
WBC # BLD AUTO: 5.3 10E9/L (ref 4–11)

## 2019-01-02 PROCEDURE — 25000132 ZZH RX MED GY IP 250 OP 250 PS 637: Performed by: STUDENT IN AN ORGANIZED HEALTH CARE EDUCATION/TRAINING PROGRAM

## 2019-01-02 PROCEDURE — 12400001 ZZH R&B MH UMMC

## 2019-01-02 PROCEDURE — 25000132 ZZH RX MED GY IP 250 OP 250 PS 637: Performed by: PSYCHIATRY & NEUROLOGY

## 2019-01-02 PROCEDURE — 85025 COMPLETE CBC W/AUTO DIFF WBC: CPT | Performed by: STUDENT IN AN ORGANIZED HEALTH CARE EDUCATION/TRAINING PROGRAM

## 2019-01-02 PROCEDURE — 99232 SBSQ HOSP IP/OBS MODERATE 35: CPT | Mod: GC | Performed by: PSYCHIATRY & NEUROLOGY

## 2019-01-02 PROCEDURE — 36415 COLL VENOUS BLD VENIPUNCTURE: CPT | Performed by: STUDENT IN AN ORGANIZED HEALTH CARE EDUCATION/TRAINING PROGRAM

## 2019-01-02 RX ADMIN — LORAZEPAM 1 MG: 1 TABLET ORAL at 10:51

## 2019-01-02 RX ADMIN — LITHIUM CARBONATE 600 MG: 300 TABLET, EXTENDED RELEASE ORAL at 22:40

## 2019-01-02 RX ADMIN — LORAZEPAM 2 MG: 2 TABLET ORAL at 22:41

## 2019-01-02 RX ADMIN — CLOZAPINE 275 MG: 100 TABLET ORAL at 22:40

## 2019-01-02 RX ADMIN — POLYETHYLENE GLYCOL 3350 17 G: 17 POWDER, FOR SOLUTION ORAL at 10:51

## 2019-01-02 RX ADMIN — LORAZEPAM 1 MG: 1 TABLET ORAL at 14:47

## 2019-01-02 RX ADMIN — IPRATROPIUM BROMIDE 1 SPRAY: 21 SPRAY NASAL at 22:41

## 2019-01-02 RX ADMIN — DOCUSATE SODIUM 250 MG: 250 CAPSULE, LIQUID FILLED ORAL at 10:51

## 2019-01-02 ASSESSMENT — ACTIVITIES OF DAILY LIVING (ADL)
HYGIENE/GROOMING: INDEPENDENT
DRESS: INDEPENDENT
ORAL_HYGIENE: INDEPENDENT
LAUNDRY: WITH SUPERVISION

## 2019-01-02 NOTE — PROGRESS NOTES
"Pt slept 6.75 hours last night. Up briefly to bathroom; voided and had a BM. Started to walk out to dining room, needing redirection back to . Pt also needed prompting to wash her hands. Bed change for drooling, mainly on pillow case. Pt is quiet, and appears to be in somewhat of a \"daze.\" Her gait was steady.  "

## 2019-01-02 NOTE — PROGRESS NOTES
01/01/19 8022   Behavioral Health   Hallucinations appears responding   Thinking confused   Insight poor   Judgement impaired   Eye Contact at examiner   Affect blunted, flat   Mood mood is calm;depressed   Physical Appearance/Attire attire appropriate to age and situation   Hygiene well groomed   Suicidality other (see comments)  (None observed)   1. Wish to be Dead No   2. Non-Specific Active Suicidal Thoughts  No   Self Injury other (see comment)  (None observed)   Elopement (None observed)   Activity isolative;withdrawn   Medication Sensitivity sedation   Psychomotor / Gait balanced;steady   Psycho Education   Type of Intervention 1:1 intervention   Response unavailable   Hours 0.5   Treatment Detail Check-In   Activities of Daily Living   Hygiene/Grooming independent   Oral Hygiene independent   Dress independent   Laundry unable to complete   Room Organization independent     Pt was out in the milieu with her parents for the first hour of the shift. Once her parents left, pt went back to her room and slept. Pt later woke up for dinner where she only eat a pudding off her dinner tray. Pt then went back to her room and slept for the rest of the shift.

## 2019-01-02 NOTE — PROGRESS NOTES
"    ----------------------------------------------------------------------------------------------------------  [facility], [place] [employer]   Psychiatric Progress Note  Hospital Day #42     Interim History:   The patient's care was discussed with the treatment team and chart notes were reviewed.    Sleep: 6 hrs  Scheduled Medications: Received.  Interval history:  She has reported to staff of feeling depressed and confused. Had complained of drooling as well though day time had been much better. Says she feels sick. No report of hallucinations, delusions or thought phenomenon. She appears more tired today though. She did not have any localizing symptoms and was afebrile (reviewed blood tests). During the interview she did complain of feeling depressed/sad. No SI was elaborated. She was much quieter today during the interview. To review with staff need for continued SIO, will continue for medical monitoring for today.  She did not have any adverse effects with any of her current medications, and no Lithium toxicity noted.  The risks, benefits, alternatives and side effects of any medication changes have been discussed and are understood by the patient and other caregivers.    Review of Systems:   See HPI for pertinent ROS.          Allergies:     Allergies   Allergen Reactions     Septra [Sulfamethoxazole W/Trimethoprim]             Psychiatric Examination:   /75   Pulse 120   Temp 98.7  F (37.1  C) (Oral)   Resp 24   Ht 1.702 m (5' 7\")   Wt 58.5 kg (129 lb)   SpO2 100%   BMI 20.20 kg/m    Weight is 129 lbs 0 oz  Body mass index is 20.2 kg/m .    Appearance: In bed, lying down. Wearing hospital scrubs. Wearing a woolen cap.  Attitude:Co-operative.  Eye Contact: Fair.  Mood: \"Not well\"  Affect:  Blunt  Speech: Slower  Psychomotor Behavior: Slow but fluid movement. No evidence of stereotypic movements, tardive dyskinesia, dystonia or tics. No tremor.   Thought Process: Illogical and " confused.  Associations: No loose associations.  Thought Content: Unable to assess  Insight: Poor - improving  Judgment: Poor.  Attention Span and Concentration: Limited.  Recent and Remote Memory: Poor recent memory. Difficult to assess remote memory.  Language: Speaking fluent English with strong accent.  Fund of Knowledge: Difficult to assess.  Muscle Strength and Tone: Appearance is grossly normal, not obviously stiff. Mild EPS.  Gait and Station: normal appearing gait, unsteady/shuffling at times.     Labs:     Norclozapine Quant 163   ng/mL Final 12/21/2018  7:44 AM 13   Clozapine-N-Oxide Quant <100   ng/mL Final 12/21/2018  7:44 AM 13   Clozapine and Metabolites Total 572  <=1,500 ng/mL Final 12/21/2018  7:44 AM 13          Assessment    Diagnostic Impression: Ms. Davis is a 23 y.o. F with a psychiatric history of MDD with psychotic features who presented to the [facility] ED via EMS on 11/21/2018 due to psychosis in the setting of medication non-adherence. Her primary presentation is psychosis, as evidenced by reported and witnessed visual and auditory hallucinations including command hallucinations taking the form of her father, delusions that her father is trying to poison her, stereotypic hand movements, writhing leg movements, echopraxia, and delayed speech latency. Demonstrates emotional lability from calm baseline, with rapid transition to and from sobbing or dancing/singing/laughing. Sleep maintenance is poor and fluctuating. Given her prior diagnosis of MDD with psychosis and her current presentation with acute psychosis in the absence of depressive sxs, and disorganized thinking, a thought disorder seems to be the most likely pathology. Ddx includes schizoaffective disorder and less likely bipolar depression or MDD with psychotic features. Current symptoms, along with initial improvement with lorazepam suggest component of catatonia to her current unspecified condition.     Hospital Course: Ms. Davis  was admitted to [facility] on a 72 hour hold. Treatment team filed for commitment and Gu[facility]ro which have been received. Olanzapine was started and uptitrated. PRN hydroxyzine and trazodone were initiated. There was some concern for catatonia given stereotypic movements and poor olanzapine response so she was given lorazepam with marked clinical improvement. Scheduled lorazepam was started. Not in full remission with olanzapine so attempted transition to aripiprazole which she has previously taken with good reported response, but she had recurrence of delusions, increased lability, new hyperactivity/agitation, and worsening sleep maintenance, so she is transitioning back to olanzapine. After demonstrating poor symptom improvement, clozapine was started and titrated while olanzapine was tapered off. She was tachycardic in the 120s without symptoms and was closely followed up , with rpt EKG WNL. With background mood lability, hyper-religiosity, periods of psychomotor agitation, Lithium was started on 12/31/18.     Medical Course: CMP, lipid panel, TSH, B12/folate, CBC, sed rate, lyme, treponema, HIV, EMERALD, ceruloplasmin, ECG WNL. Vitamin D low. UA non-infectious. Demonstrated orthostasis, attributable to up-titration of olanzapine. Repeat CBC w/ diff WNL. Bowel regimen initiated for constipation. Ipratropium spray started for sialorrhea. CMP WNL.    Plan   Principal Diagnosis:   # Unspecified Schizophrenia Spectrum and Other Psychotic Disorder dd -BPAD- Mixed affective with psychosis  # R/O Catatonia    Psychotropic Medications:  Modify:  - Increased Clozapine to 275 mg daily (12/26/18 )  - Lithium 900 mg a day level due on 12/7/19    Continue:  - Olanzapine stopped   - Haldol 5 mg Q4H PRN   - Diphenhydramine 50 mg Q4H PRN  - Lorazepam 2 mg at bedtime, 1 mg BID  - Cogentin 1 mg BID PRN for akathisia/dystonia  - Hydroxyzine 25 mg Q4H PRN   - Ocean spray Q1H PRN for nasal congestion    Options considered if continued poor  response - scheduling haloperidol, adding Lithium for suspected mood-catatonia (excited stupor), review depression presentation- serotonin specific reuptake inhibitor trial or  ECT.    Patient will be treated in therapeutic milieu with appropriate individual and group therapies as described.  WBC counts reviewed and normal.    Medical diagnoses to be addressed this admission:    # Vitamin D Deficiency  - Continue ergocalciferol 50,000 Units Q7 days    # Constipation  - Miralax 17 g daily  - Docusate 250 mg daily  - Mylanta/Maalox QID PRN    # Headache/Neck Pain   - Ibuprofen 400 mg Q6H PRN  - Acetaminophen PRN  - Menthol Topical Analgesic Q6H PRN    # Sialorrhea  Secondary to clozapine.   Strategies like use of towel helping  # Nasal Congestion  - Ocean nasal spray PRN    Data: As above.  Consults: None.    Legal Status: Commitment with [facility].    Safety Assessment:   Behavioral Orders   Procedures     Code 1 - Restrict to Unit     Elopement precautions     Fall precautions     Routine Programming     As clinically indicated     Single Room     Status 15     Every 15 minutes.     Status Individual Observation     Order Specific Question:   CONTINUOUS 24 hours / day     Answer:   5 feet     Order Specific Question:   Indications for SIO     Answer:   Severe intrusiveness     Suicide precautions     Patients on Suicide Precautions should have a Combination Diet ordered that includes a Diet selection(s) AND a Behavioral Tray selection for Safe Tray - with utensils, or Safe Tray - NO utensils         Disposition: TBD pending clinical stabilization and medication optimization.     Patient was seen and discussed with the attending MD Jessica Dick MD,  PGY1 Psychiatry resident    Psychiatry Attending Attestation:    This patient has been seen and evaluated by me, Mauro Vann.  I have discussed this patient with the house staff team including the resident and medical student and I agree with the  findings and plan in this note.    I have reviewed today's vital signs, medications, labs and imaging. Mauro Vann MD

## 2019-01-02 NOTE — PLAN OF CARE
Occupational Therapy Goals    Pt attended about x30 minutes (no charge) of a mental health management group with a focus on coping through movement to facilitate relaxation and stress management via chair yoga. Pt chose to observe the yoga movements, and spent a majority of group observing group members. Intense staring observed at times. She was unable to participate in a check-in at the end of group, though politely thanked writer before leaving the group room. Will continue to assess.

## 2019-01-02 NOTE — PLAN OF CARE
48 hour nursing assessment:  Pt evaluation continues. Assessed mood, anxiety, thoughts, and behavior. Is slowly progressing towards goals. Encouraged participation in taking medications when offered and eating meals healthy coping skills. Pt appears to be having auditory andvisual  hallucinations. Refer to daily team meeting notes for individualized plan of care. Will continue to assess.

## 2019-01-03 PROCEDURE — 25000132 ZZH RX MED GY IP 250 OP 250 PS 637: Performed by: STUDENT IN AN ORGANIZED HEALTH CARE EDUCATION/TRAINING PROGRAM

## 2019-01-03 PROCEDURE — 12400001 ZZH R&B MH UMMC

## 2019-01-03 PROCEDURE — G0177 OPPS/PHP; TRAIN & EDUC SERV: HCPCS

## 2019-01-03 PROCEDURE — 99232 SBSQ HOSP IP/OBS MODERATE 35: CPT | Mod: GC | Performed by: PSYCHIATRY & NEUROLOGY

## 2019-01-03 PROCEDURE — 25000132 ZZH RX MED GY IP 250 OP 250 PS 637: Performed by: PSYCHIATRY & NEUROLOGY

## 2019-01-03 RX ADMIN — CLOZAPINE 275 MG: 100 TABLET ORAL at 21:27

## 2019-01-03 RX ADMIN — LORAZEPAM 1 MG: 1 TABLET ORAL at 13:36

## 2019-01-03 RX ADMIN — LITHIUM CARBONATE 600 MG: 300 TABLET, EXTENDED RELEASE ORAL at 21:27

## 2019-01-03 RX ADMIN — LORAZEPAM 2 MG: 2 TABLET ORAL at 21:28

## 2019-01-03 RX ADMIN — POLYETHYLENE GLYCOL 3350 17 G: 17 POWDER, FOR SOLUTION ORAL at 08:55

## 2019-01-03 RX ADMIN — LORAZEPAM 1 MG: 1 TABLET ORAL at 08:55

## 2019-01-03 RX ADMIN — DOCUSATE SODIUM 250 MG: 250 CAPSULE, LIQUID FILLED ORAL at 08:55

## 2019-01-03 ASSESSMENT — ACTIVITIES OF DAILY LIVING (ADL)
ORAL_HYGIENE: INDEPENDENT
DRESS: INDEPENDENT
HYGIENE/GROOMING: INDEPENDENT
LAUNDRY: WITH SUPERVISION
ORAL_HYGIENE: INDEPENDENT
HYGIENE/GROOMING: INDEPENDENT;SHOWER
DRESS: INDEPENDENT;SCRUBS (BEHAVIORAL HEALTH)

## 2019-01-03 NOTE — PROGRESS NOTES
"    ----------------------------------------------------------------------------------------------------------  [facility], [place]   Psychiatric Progress Note  Hospital Day #43     Interim History:   The patient's care was discussed with the treatment team and chart notes were reviewed.    Sleep: 6.75 hrs  Scheduled Medications: Received.  Interval history:  Patient has been withdrawn and isolative according to staff. However, she is much better with regard to her symptoms and is not disruptive in the milieu. She attends and sits through groups now. She has not voiced any psychotic symptoms. At times stares intensely.  Has meals brought by her parents but skips hospital dinner. According to her family she is improving. Met with Dannie later in the morning, as she had been drowsy and in bed earlier. She had completed her meal with her father. Showed good appetite and ate well. Was communicative with father, who also said that she was doing better. She had not elaborated any psychotic symptoms in the last few days. The mood changes noted were also much less. She did not have any physical complaints. Discussed with patient and father about tentative planning for discharge in the next few weeks.    She did not have any adverse effects with any of her current medications, and no Lithium toxicity noted.  The risks, benefits, alternatives and side effects of any medication changes have been discussed and are understood by the patient and other caregivers.    Review of Systems:   See HPI for pertinent ROS.          Allergies:     Allergies   Allergen Reactions     Septra [Sulfamethoxazole W/Trimethoprim]             Psychiatric Examination:   /65   Pulse 122   Temp 98.3  F (36.8  C) (Oral)   Resp 24   Ht 1.702 m (5' 7\")   Wt 58.5 kg (129 lb)   SpO2 100%   BMI 20.20 kg/m    Weight is 129 lbs 0 oz  Body mass index is 20.2 kg/m .    Appearance: In bed, lying down. Wearing hospital " "scrubs. Wearing a woolen cap.  Attitude:Co-operative.  Eye Contact: Fair.  Mood: \"Not well\"  Affect:  Blunt  Speech: Slower  Psychomotor Behavior: Slow but fluid movement. No evidence of stereotypic movements, tardive dyskinesia, dystonia or tics. No tremor.   Thought Process: Illogical and confused.  Associations: No loose associations.  Thought Content: Unable to assess  Insight: Poor - improving  Judgment: Poor.  Attention Span and Concentration: Limited.  Recent and Remote Memory: Poor recent memory. Difficult to assess remote memory.  Language: Speaking fluent English with strong accent.  Fund of Knowledge: Difficult to assess.  Muscle Strength and Tone: Appearance is grossly normal, not obviously stiff. Mild EPS.  Gait and Station: normal appearing gait, unsteady/shuffling at times.     Labs:     Norclozapine Quant 163   ng/mL Final 12/21/2018  7:44 AM 13   Clozapine-N-Oxide Quant <100   ng/mL Final 12/21/2018  7:44 AM 13   Clozapine and Metabolites Total 572  <=1,500 ng/mL Final 12/21/2018  7:44 AM 13          Assessment    Diagnostic Impression: Ms. Davis is a 23 y.o. F with a psychiatric history of MDD with psychotic features who presented to the [facility] ED via EMS on 11/21/2018 due to psychosis in the setting of medication non-adherence. Her primary presentation is psychosis, as evidenced by reported and witnessed visual and auditory hallucinations including command hallucinations taking the form of her father, delusions that her father is trying to poison her, stereotypic hand movements, writhing leg movements, echopraxia, and delayed speech latency. Demonstrates emotional lability from calm baseline, with rapid transition to and from sobbing or dancing/singing/laughing. Sleep maintenance is poor and fluctuating. Given her prior diagnosis of MDD with psychosis and her current presentation with acute psychosis in the absence of depressive sxs, and disorganized thinking, a thought disorder seems to be the " most likely pathology. Ddx includes schizoaffective disorder and less likely bipolar depression or MDD with psychotic features. Current symptoms, along with initial improvement with lorazepam suggest component of catatonia to her current unspecified condition.     Hospital Course: Ms. Davis was admitted to [facility] on a 72 hour hold. Treatment team filed for commitment and Guerrero which have been received. Olanzapine was started and uptitrated. PRN hydroxyzine and trazodone were initiated. There was some concern for catatonia given stereotypic movements and poor olanzapine response so she was given lorazepam with marked clinical improvement. Scheduled lorazepam was started. Not in full remission with olanzapine so attempted transition to aripiprazole which she has previously taken with good reported response, but she had recurrence of delusions, increased lability, new hyperactivity/agitation, and worsening sleep maintenance, so she is transitioning back to olanzapine. After demonstrating poor symptom improvement, clozapine was started and titrated while olanzapine was tapered off. She was tachycardic in the 120s without symptoms and was closely followed up , with rpt EKG WNL. With background mood lability, hyper-religiosity, periods of psychomotor agitation, Lithium was started on 12/31/18.     Medical Course: CMP, lipid panel, TSH, B12/folate, CBC, sed rate, lyme, treponema, HIV, EMERALD, ceruloplasmin, ECG WNL. Vitamin D low. UA non-infectious. Demonstrated orthostasis, attributable to up-titration of olanzapine. Repeat CBC w/ diff WNL. Bowel regimen initiated for constipation. Ipratropium spray started for sialorrhea. CMP WNL.    Plan   Principal Diagnosis:   # Unspecified Schizophrenia Spectrum and Other Psychotic Disorder dd -BPAD- Mixed affective with psychosis  # R/O Catatonia    Psychotropic Medications:  Modify:  - Increased Clozapine to 275 mg daily (12/26/18 )  - Lithium 900 mg a day level due on  12/7/19    Continue:  - Olanzapine stopped   - Haldol 5 mg Q4H PRN   - Diphenhydramine 50 mg Q4H PRN  - Lorazepam 2 mg at bedtime, 1 mg BID  - Cogentin 1 mg BID PRN for akathisia/dystonia  - Hydroxyzine 25 mg Q4H PRN   - Ocean spray Q1H PRN for nasal congestion    Options considered if continued poor response - scheduling haloperidol, adding Lithium for suspected mood-catatonia (excited stupor), review depression presentation- serotonin specific reuptake inhibitor trial or  ECT.  At this time, with improvements noted, and noted significant sedation and sialorrhea, consider reduction in Lorazepam after Lithium levels and re-evaluate clozapine dose.  Patient will be treated in therapeutic milieu with appropriate individual and group therapies as described.  WBC counts reviewed and normal.    Medical diagnoses to be addressed this admission:    # Vitamin D Deficiency  - Continue ergocalciferol 50,000 Units Q7 days    # Constipation  - Miralax 17 g daily  - Docusate 250 mg daily  - Mylanta/Maalox QID PRN    # Headache/Neck Pain   - Ibuprofen 400 mg Q6H PRN  - Acetaminophen PRN  - Menthol Topical Analgesic Q6H PRN    # Sialorrhea  Secondary to clozapine.   Strategies like use of towel helping  # Nasal Congestion  - Ocean nasal spray PRN    Data: As above.  Consults: None.    Legal Status: Commitment with [facility]ro.    Safety Assessment:   Behavioral Orders   Procedures     Code 1 - Restrict to Unit     Elopement precautions     Fall precautions     Routine Programming     As clinically indicated     Single Room     Status 15     Every 15 minutes.     Status Individual Observation     Order Specific Question:   CONTINUOUS 24 hours / day     Answer:   5 feet     Order Specific Question:   Indications for SIO     Answer:   Severe intrusiveness     Suicide precautions     Patients on Suicide Precautions should have a Combination Diet ordered that includes a Diet selection(s) AND a Behavioral Tray selection for Safe Tray - with  utensils, or Safe Tray - NO utensils         Disposition: TBD pending clinical stabilization and medication optimization.     Patient was seen and discussed with the attending MD Jessica Samson MD,  PGY1 Psychiatry resident    Psychiatry Attending Attestation:  This patient has been seen and evaluated by me, Missy Kiran.  I have discussed this patient with the psychiatry resident and I agree with the findings and plan in this note.    I have reviewed today's vital signs, medications, labs and imaging.   Missy Kiran MD.

## 2019-01-03 NOTE — PROGRESS NOTES
Pt was isolative and withdrawn. She is still catatonic and spent most of this shift in her room. She took her scheduled AM medication without issue. She refused breakfast, but her father brought some food for her around 11 am. She continues on 24 hrs SIO. No intrusive behaviors this shift. She attends group and social with Candy. She showered this morning.

## 2019-01-03 NOTE — PLAN OF CARE
BEHAVIORAL TEAM DISCUSSION    Participants:   Missy Kiran MD Attending Psychiatrist  Jessica Lim MD PGY1  Vanda Weiss MSW, [facility] Clinical Treatment Coordinator  Kelley Harrison RN  Progress: progress towards functional goals has been slow overall with recent improvements   Continued Stay Criteria/Rationale: symptoms of psychosis persist requiring ongoing medication management and assessment for efficacy. patient has had recent minimal improvements, assessment for ongoing and sustained improvements ongoing.   Medical/Physical: Per psychiatry physician progress note  Precautions:   Behavioral Orders   Procedures    Code 1 - Restrict to Unit    Elopement precautions    Fall precautions    Routine Programming     As clinically indicated    Single Room    Status 15     Every 15 minutes.    Status Individual Observation     Order Specific Question:   CONTINUOUS 24 hours / day     Answer:   5 feet     Order Specific Question:   Indications for SIO     Answer:   Severe intrusiveness    Suicide precautions     Patients on Suicide Precautions should have a Combination Diet ordered that includes a Diet selection(s) AND a Behavioral Tray selection for Safe Tray - with utensils, or Safe Tray - NO utensils       Plan: continue with medication management and assessment on unit. Ongoing assessment for symptom improvement. Patient is committed with Guerrero order has supportive family and out patient providers likely discharge disposition will be home with out patient programming however assessment for appropriate level of care for discharge/provisional discharge is ongoing.   Rationale for change in precautions or plan: per ongoing assessment.

## 2019-01-03 NOTE — PROGRESS NOTES
Pt was isolative and slept the majority of the shift. Pt was calm and would stare and smile at staff although she didn't respond to questions or prompts. Pt didn't eat dinner. No SI or SIB.      01/02/19 2000   Behavioral Health   Hallucinations appears responding   Thinking confused   Orientation date: oriented;place: oriented;person: oriented;time: oriented   Memory other (see comment)  (alina)   Insight (alina)   Judgement impaired   Eye Contact staring   Affect blunted, flat   Mood mood is calm   Physical Appearance/Attire posture rigid   Hygiene well groomed   Suicidality other (see comments)  (no)   1. Wish to be Dead No   2. Non-Specific Active Suicidal Thoughts  No   Self Injury other (see comment)  (no)   Activity isolative;withdrawn   Speech mute   Medication Sensitivity sedation   Psychomotor / Gait balanced;steady   Psycho Education   Type of Intervention 1:1 intervention   Response participates, initiates socially appropriate   Hours 0.5   Treatment Detail check in   Activities of Daily Living   Hygiene/Grooming independent   Oral Hygiene independent   Dress independent   Laundry with supervision   Room Organization independent

## 2019-01-03 NOTE — PLAN OF CARE
Dannie  attended 1 of 2 OT groups today. Vacant affect continues. Became teary at one point for unknown reasons. Has a statuesque form of physical stillness. Did not respond to simple questions.

## 2019-01-03 NOTE — PROGRESS NOTES
Pt slept 6.75 hours. Pt got up twice to use the restroom. While awake the second time pt appeared to have heavy drooling. Writer changed the bed linen and pt changed her scrubs in the restroom.

## 2019-01-03 NOTE — PROGRESS NOTES
Pt present in milieu, participating in groups, showered.  Pt's father visited and pt ate food brought in.  During OT pt became emotional when a Jose Maria song came on and cried.  Pt did some coloring in her room.  Pt did not speak all shift.  No SI/SIB observed.       01/03/19 1400   Behavioral Health   Hallucinations appears responding   Thinking confused   Orientation place: oriented   Memory other (see comment)  (SALAS)   Insight poor   Judgement impaired   Eye Contact at examiner;staring   Affect blunted, flat   Mood mood is calm   Physical Appearance/Attire attire appropriate to age and situation   Hygiene well groomed   Suicidality other (see comments)  (denies)   1. Wish to be Dead No   2. Non-Specific Active Suicidal Thoughts  No   Self Injury other (see comment)  (denies)   Activity other (see comment)  (present in milieu)   Speech mute   Medication Sensitivity no observed side effects   Psychomotor / Gait balanced;steady   Psycho Education   Type of Intervention 1:1 intervention   Response observes from a distance   Activities of Daily Living   Hygiene/Grooming independent;shower   Oral Hygiene independent   Dress independent;scrubs (behavioral health)   Room Organization independent

## 2019-01-04 PROCEDURE — 99231 SBSQ HOSP IP/OBS SF/LOW 25: CPT | Mod: GC | Performed by: PSYCHIATRY & NEUROLOGY

## 2019-01-04 PROCEDURE — 12400001 ZZH R&B MH UMMC

## 2019-01-04 PROCEDURE — 25000132 ZZH RX MED GY IP 250 OP 250 PS 637: Performed by: PSYCHIATRY & NEUROLOGY

## 2019-01-04 PROCEDURE — 25000132 ZZH RX MED GY IP 250 OP 250 PS 637: Performed by: STUDENT IN AN ORGANIZED HEALTH CARE EDUCATION/TRAINING PROGRAM

## 2019-01-04 PROCEDURE — G0177 OPPS/PHP; TRAIN & EDUC SERV: HCPCS

## 2019-01-04 RX ADMIN — DOCUSATE SODIUM 250 MG: 250 CAPSULE, LIQUID FILLED ORAL at 08:24

## 2019-01-04 RX ADMIN — CLOZAPINE 275 MG: 100 TABLET ORAL at 21:06

## 2019-01-04 RX ADMIN — LORAZEPAM 1 MG: 1 TABLET ORAL at 08:24

## 2019-01-04 RX ADMIN — POLYETHYLENE GLYCOL 3350 17 G: 17 POWDER, FOR SOLUTION ORAL at 08:24

## 2019-01-04 RX ADMIN — IPRATROPIUM BROMIDE 1 SPRAY: 21 SPRAY NASAL at 21:08

## 2019-01-04 RX ADMIN — LITHIUM CARBONATE 600 MG: 300 TABLET, EXTENDED RELEASE ORAL at 21:07

## 2019-01-04 RX ADMIN — LORAZEPAM 2 MG: 2 TABLET ORAL at 21:07

## 2019-01-04 RX ADMIN — LORAZEPAM 1 MG: 1 TABLET ORAL at 13:41

## 2019-01-04 ASSESSMENT — ACTIVITIES OF DAILY LIVING (ADL)
ORAL_HYGIENE: INDEPENDENT
ORAL_HYGIENE: INDEPENDENT
HYGIENE/GROOMING: SHOWER;INDEPENDENT
DRESS: SCRUBS (BEHAVIORAL HEALTH);STREET CLOTHES
DRESS: SCRUBS (BEHAVIORAL HEALTH);INDEPENDENT
HYGIENE/GROOMING: SHOWER;INDEPENDENT

## 2019-01-04 NOTE — PROGRESS NOTES
"    ----------------------------------------------------------------------------------------------------------  [facility], [place]   Psychiatric Progress Note  Hospital Day #44     Interim History:   The patient's care was discussed with the treatment team and chart notes were reviewed.    Sleep: 7 hrs  Scheduled Medications: Received.  Interval history:  Dannie is now off her SIO and with no behavioral abnormalities. She is noted to be more sedated now than before. Moments of lability of mood, eg crying when Jose Maria garcia comes on have been reported. Statuesque posturing noted but no other suggestion of catatonia. When with parent is much more reactive and asks very relevant questions about her home and child.     She did not have any physical complaints. Discussed with patient and father about tentative planning for discharge in the next few weeks.    She did not have any adverse effects with any of her current medications, and no Lithium toxicity noted.  The risks, benefits, alternatives and side effects of any medication changes have been discussed and are understood by the patient and other caregivers.    Review of Systems:   See HPI for pertinent ROS.          Allergies:     Allergies   Allergen Reactions     Septra [Sulfamethoxazole W/Trimethoprim]             Psychiatric Examination:   /85 (BP Location: Left arm)   Pulse 113   Temp 98.5  F (36.9  C) (Oral)   Resp 14   Ht 1.702 m (5' 7\")   Wt 58.5 kg (129 lb)   SpO2 100%   BMI 20.20 kg/m    Weight is 129 lbs 0 oz  Body mass index is 20.2 kg/m .    Appearance: In bed, lying down. Wearing hospital scrubs. Wearing a woolen cap.  Attitude:Co-operative.  Eye Contact: Fair.  Mood: \"Not well\"  Affect:  Blunt  Speech: Slower  Psychomotor Behavior: Slow but fluid movement. No evidence of stereotypic movements, tardive dyskinesia, dystonia or tics. No tremor.   Thought Process: Illogical and confused.  Associations: No loose " associations.  Thought Content: Unable to assess  Insight: Poor - improving  Judgment: Poor.  Attention Span and Concentration: Limited.  Recent and Remote Memory: Poor recent memory. Difficult to assess remote memory.  Language: Speaking fluent English with strong accent.  Fund of Knowledge: Difficult to assess.  Muscle Strength and Tone: Appearance is grossly normal, not obviously stiff. Mild EPS.  Gait and Station: normal appearing gait, unsteady/shuffling at times.     Labs:     Norclozapine Quant 163   ng/mL Final 12/21/2018  7:44 AM 13   Clozapine-N-Oxide Quant <100   ng/mL Final 12/21/2018  7:44 AM 13   Clozapine and Metabolites Total 572  <=1,500 ng/mL Final 12/21/2018  7:44 AM 13          Assessment    Diagnostic Impression: Ms. Davis is a 23 y.o. F with a psychiatric history of MDD with psychotic features who presented to the [facility] ED via EMS on 11/21/2018 due to psychosis in the setting of medication non-adherence. Her primary presentation is psychosis, as evidenced by reported and witnessed visual and auditory hallucinations including command hallucinations taking the form of her father, delusions that her father is trying to poison her, stereotypic hand movements, writhing leg movements, echopraxia, and delayed speech latency. Demonstrates emotional lability from calm baseline, with rapid transition to and from sobbing or dancing/singing/laughing. Sleep maintenance is poor and fluctuating. Given her prior diagnosis of MDD with psychosis and her current presentation with acute psychosis in the absence of depressive sxs, and disorganized thinking, a thought disorder seems to be the most likely pathology. Ddx includes schizoaffective disorder and less likely bipolar depression or MDD with psychotic features. Current symptoms, along with initial improvement with lorazepam suggest component of catatonia to her current unspecified condition.     Hospital Course: Ms. Davis was admitted to [facility] on a 72  hour hold. Treatment team filed for commitment and Guerrero which have been received. Olanzapine was started and uptitrated. PRN hydroxyzine and trazodone were initiated. There was some concern for catatonia given stereotypic movements and poor olanzapine response so she was given lorazepam with marked clinical improvement. Scheduled lorazepam was started. Not in full remission with olanzapine so attempted transition to aripiprazole which she has previously taken with good reported response, but she had recurrence of delusions, increased lability, new hyperactivity/agitation, and worsening sleep maintenance, so she is transitioning back to olanzapine. After demonstrating poor symptom improvement, clozapine was started and titrated while olanzapine was tapered off. She was tachycardic in the 120s without symptoms and was closely followed up , with rpt EKG WNL. With background mood lability, hyper-religiosity, periods of psychomotor agitation, Lithium was started on 12/31/18.     Medical Course: CMP, lipid panel, TSH, B12/folate, CBC, sed rate, lyme, treponema, HIV, EMERALD, ceruloplasmin, ECG WNL. Vitamin D low. UA non-infectious. Demonstrated orthostasis, attributable to up-titration of olanzapine. Repeat CBC w/ diff WNL. Bowel regimen initiated for constipation. Ipratropium spray started for sialorrhea. CMP WNL.    Plan   Principal Diagnosis:   # Unspecified Schizophrenia Spectrum and Other Psychotic Disorder dd -BPAD- Mixed affective with psychosis  # R/O Catatonia    Psychotropic Medications:  Modify:  - Increased Clozapine to 275 mg daily (12/26/18 )  - Lithium 600 mg a day level due on 12/5/19    Continue:  - Olanzapine stopped   - Haldol 5 mg Q4H PRN   - Diphenhydramine 50 mg Q4H PRN  - Lorazepam 2 mg at bedtime, 1 mg BID  - Cogentin 1 mg BID PRN for akathisia/dystonia  - Hydroxyzine 25 mg Q4H PRN   - Ocean spray Q1H PRN for nasal congestion    Options considered if continued poor response - scheduling haloperidol,  adding Lithium for suspected mood-catatonia (excited stupor), review depression presentation- serotonin specific reuptake inhibitor trial or  ECT.  At this time, with improvements noted, and noted significant sedation and sialorrhea, consider reduction in Lorazepam after Lithium levels and re-evaluate clozapine dose.  Patient will be treated in therapeutic milieu with appropriate individual and group therapies as described.  WBC counts reviewed and normal.    Medical diagnoses to be addressed this admission:    # Vitamin D Deficiency  - Continue ergocalciferol 50,000 Units Q7 days    # Constipation  - Miralax 17 g daily  - Docusate 250 mg daily  - Mylanta/Maalox QID PRN    # Headache/Neck Pain   - Ibuprofen 400 mg Q6H PRN  - Acetaminophen PRN  - Menthol Topical Analgesic Q6H PRN    # Sialorrhea  Secondary to clozapine.   Strategies like use of towel helping  # Nasal Congestion  - Ocean nasal spray PRN    Data: As above.  Consults: None.    Legal Status: Commitment with Gu[facility]ro.    Safety Assessment:   Behavioral Orders   Procedures     Code 1 - Restrict to Unit     Elopement precautions     Fall precautions     Routine Programming     As clinically indicated     Single Room     Status 15     Every 15 minutes.     Suicide precautions     Patients on Suicide Precautions should have a Combination Diet ordered that includes a Diet selection(s) AND a Behavioral Tray selection for Safe Tray - with utensils, or Safe Tray - NO utensils         Disposition: TBD pending clinical stabilization and medication optimization.     Patient was seen and discussed with the attending .    Jessica Lim MD,  PGY1 Psychiatry resident    Psychiatry Attending Attestation:    This patient has been seen and evaluated by me, Mauro Vann.  I have discussed this patient with the house staff team including the resident and medical student and I agree with the findings and plan in this note.    I have reviewed today's vital signs,  medications, labs and imaging. Mauro Vann MD

## 2019-01-04 NOTE — PLAN OF CARE
"\"I'm better, I feel fine\". Patient is flat and slow to respond. Denies depression, anxiety, hallucinations and paranoia. States thoughts are more clear and is able to focus and concentrate better. Attends some of the groups \"sometimes\". Does not socialize with peers. Parents were here this morning to visit and brought food for patient. Patient ate most the food. Was awake the entire shift.   "

## 2019-01-04 NOTE — PROGRESS NOTES
Pt spent entire shift in room, resting and coloring.  Pt spoke very little.  Pt stared blankly when asked questions and was slow to respond.  Pt did not eat dinner or snack.  Pt appears responding, pt denies SI, and SIB.         01/03/19 2100   Behavioral Health   Hallucinations appears responding   Thinking confused;poor concentration   Insight poor   Judgement impaired   Eye Contact into space;at examiner   Affect blunted, flat   Mood anxious;mood is calm   Physical Appearance/Attire attire appropriate to age and situation   Hygiene other (see comment)  (Adequate)   1. Wish to be Dead No   2. Non-Specific Active Suicidal Thoughts  No   Activity isolative;withdrawn   Speech other (see comments)  (Quiet, slow to respond.)   Psychomotor / Gait slow;steady   Psycho Education   Type of Intervention 1:1 intervention   Response participates with encouragement   Hours 0.5   Treatment Detail Check-in   Activities of Daily Living   Hygiene/Grooming independent   Oral Hygiene independent   Dress independent   Laundry with supervision   Room Organization independent

## 2019-01-05 PROCEDURE — 25000132 ZZH RX MED GY IP 250 OP 250 PS 637: Performed by: STUDENT IN AN ORGANIZED HEALTH CARE EDUCATION/TRAINING PROGRAM

## 2019-01-05 PROCEDURE — 25000132 ZZH RX MED GY IP 250 OP 250 PS 637: Performed by: PSYCHIATRY & NEUROLOGY

## 2019-01-05 PROCEDURE — 12400001 ZZH R&B MH UMMC

## 2019-01-05 RX ADMIN — LITHIUM CARBONATE 600 MG: 300 TABLET, EXTENDED RELEASE ORAL at 21:15

## 2019-01-05 RX ADMIN — LORAZEPAM 1 MG: 1 TABLET ORAL at 09:04

## 2019-01-05 RX ADMIN — CLOZAPINE 275 MG: 100 TABLET ORAL at 21:15

## 2019-01-05 RX ADMIN — LORAZEPAM 1 MG: 1 TABLET ORAL at 14:02

## 2019-01-05 RX ADMIN — DOCUSATE SODIUM 250 MG: 250 CAPSULE, LIQUID FILLED ORAL at 09:04

## 2019-01-05 RX ADMIN — IPRATROPIUM BROMIDE 1 SPRAY: 21 SPRAY NASAL at 21:16

## 2019-01-05 RX ADMIN — POLYETHYLENE GLYCOL 3350 17 G: 17 POWDER, FOR SOLUTION ORAL at 09:04

## 2019-01-05 RX ADMIN — LORAZEPAM 2 MG: 2 TABLET ORAL at 21:16

## 2019-01-05 ASSESSMENT — ACTIVITIES OF DAILY LIVING (ADL)
ORAL_HYGIENE: INDEPENDENT
DRESS: INDEPENDENT;SCRUBS (BEHAVIORAL HEALTH)
LAUNDRY: WITH SUPERVISION
HYGIENE/GROOMING: INDEPENDENT;SHOWER

## 2019-01-05 NOTE — PROGRESS NOTES
Patient refused Lithium blood draw this morning. Staff strongly encouraged patient to have blood drawn but patient continued to refuse and did not gave any reasons why. Dr. Bustos notified late morning and reordered Lithium level to be drawn tomorrow. Writer spoke with father about blood draw refusal and father said he would talk to patient about it. Patient is aware will have blood drawn tomorrow, staff will call father tomorrow morning if patient refuses again. Patient walked out of shower today with panties and bra on holding a towel in front of her and walked to her room.

## 2019-01-05 NOTE — PROGRESS NOTES
"Pt was observed in the milieu for the majority of the evening attending an evening OT group, eating meals, and watching TV in the lounge with peers. During check-in with writer pt denied experiencing hallucinations, SI and SIB. When asked how they were feeling pt responded \"I'm fantastic\" and smiled. Pt was observed throughout the evening talking more than they have previously.      01/04/19 2106   Behavioral Health   Hallucinations denies / not responding to hallucinations   Thinking confused   Orientation person: oriented;place: oriented;date, disoriented   Memory baseline memory   Insight poor   Judgement intact   Eye Contact at examiner   Affect blunted, flat   Mood mood is calm   Physical Appearance/Attire attire appropriate to age and situation   Hygiene well groomed  (Showered)   Suicidality (Pt denies)   1. Wish to be Dead No   2. Non-Specific Active Suicidal Thoughts  No   Activity (present in milieu)   Speech clear   Medication Sensitivity no stated side effects;no observed side effects   Psychomotor / Gait balanced;steady   Psycho Education   Type of Intervention 1:1 intervention   Response participates with encouragement   Hours 0.5   Treatment Detail Check-in   Activities of Daily Living   Hygiene/Grooming shower;independent   Oral Hygiene independent   Dress scrubs (behavioral health);street clothes   Room Organization independent     "

## 2019-01-05 NOTE — PROGRESS NOTES
"Psychiatry Cross Cover Note    S: Notified by staff that Dannie declined her lithium level this morning. Her parents will come this afternoon to encourage her to have the level drawn.     O: /78 (BP Location: Right leg)   Pulse 112   Temp 98.7  F (37.1  C) (Oral)   Resp 16   Ht 1.702 m (5' 7\")   Wt 58.5 kg (129 lb)   SpO2 100%   BMI 20.20 kg/m      A/P: Dannie Davis is a 24yo F with a PMH of MDD with psychotic features admitted with psychosis. Dannie declined her Lithium level this morning and has continued to decline despite nursing encouragement. As it has been more than 14 hours since her Lithium dose, and she would not likely get the level drawn in the next few hours, will reorder level for tomorrow morning in order to obtain appropriate trough.     - Reorder Lithium level for tomorrow (1/6) AM  - Parents and nursing to encourage patient to have level drawn tomorrow AM    Evangelina Bustos MD  PGY-2 Psychiatry Resident      "

## 2019-01-05 NOTE — PROGRESS NOTES
Pt was visible in milieu, kept to herself watching TV. Pt was tense and irritable throughout the day, refused blood work in the AM. Pt requested her clothes to shower but was explained that she was on elopement precautions which require scrubs. Pt showered and would not leave for an hour and a half, she sat in the shower appearing tense. Staff checked on her periodically, later she left the shower in her bra and underwear and walked to her room. Pt would not have conversation with staff, had brief answers to questions.      01/05/19 1500   Behavioral Health   Hallucinations denies / not responding to hallucinations   Thinking confused;distractable;poor concentration   Orientation person: oriented;place: oriented   Memory baseline memory   Insight poor   Judgement impaired   Eye Contact at examiner   Affect irritable;tense   Mood irritable   Physical Appearance/Attire attire appropriate to age and situation   Hygiene well groomed   1. Wish to be Dead No   2. Non-Specific Active Suicidal Thoughts  No   Activity other (see comment)  (visible in the milieu )   Speech clear   Medication Sensitivity no stated side effects;no observed side effects   Psychomotor / Gait balanced;steady   Activities of Daily Living   Hygiene/Grooming independent;shower   Oral Hygiene independent   Dress independent;scrubs (behavioral health)   Laundry with supervision   Room Organization independent

## 2019-01-06 LAB — LITHIUM SERPL-SCNC: 0.63 MMOL/L (ref 0.6–1.2)

## 2019-01-06 PROCEDURE — 25000132 ZZH RX MED GY IP 250 OP 250 PS 637: Performed by: STUDENT IN AN ORGANIZED HEALTH CARE EDUCATION/TRAINING PROGRAM

## 2019-01-06 PROCEDURE — 12400001 ZZH R&B MH UMMC

## 2019-01-06 PROCEDURE — 36415 COLL VENOUS BLD VENIPUNCTURE: CPT | Performed by: STUDENT IN AN ORGANIZED HEALTH CARE EDUCATION/TRAINING PROGRAM

## 2019-01-06 PROCEDURE — 25000132 ZZH RX MED GY IP 250 OP 250 PS 637: Performed by: PSYCHIATRY & NEUROLOGY

## 2019-01-06 PROCEDURE — 80178 ASSAY OF LITHIUM: CPT | Performed by: STUDENT IN AN ORGANIZED HEALTH CARE EDUCATION/TRAINING PROGRAM

## 2019-01-06 RX ORDER — LITHIUM CARBONATE 450 MG
900 TABLET, EXTENDED RELEASE ORAL AT BEDTIME
Status: DISCONTINUED | OUTPATIENT
Start: 2019-01-06 | End: 2019-01-11

## 2019-01-06 RX ADMIN — POLYETHYLENE GLYCOL 3350 17 G: 17 POWDER, FOR SOLUTION ORAL at 08:55

## 2019-01-06 RX ADMIN — DOCUSATE SODIUM 250 MG: 250 CAPSULE, LIQUID FILLED ORAL at 08:54

## 2019-01-06 RX ADMIN — LORAZEPAM 1 MG: 1 TABLET ORAL at 14:04

## 2019-01-06 RX ADMIN — CLOZAPINE 275 MG: 100 TABLET ORAL at 22:10

## 2019-01-06 RX ADMIN — LORAZEPAM 1 MG: 1 TABLET ORAL at 08:55

## 2019-01-06 RX ADMIN — LORAZEPAM 2 MG: 2 TABLET ORAL at 22:10

## 2019-01-06 RX ADMIN — IPRATROPIUM BROMIDE 1 SPRAY: 21 SPRAY NASAL at 22:10

## 2019-01-06 RX ADMIN — ACETAMINOPHEN 650 MG: 325 TABLET, FILM COATED ORAL at 12:35

## 2019-01-06 RX ADMIN — LITHIUM CARBONATE 900 MG: 450 TABLET, EXTENDED RELEASE ORAL at 22:10

## 2019-01-06 ASSESSMENT — ACTIVITIES OF DAILY LIVING (ADL)
ORAL_HYGIENE: INDEPENDENT
ORAL_HYGIENE: INDEPENDENT
LAUNDRY: WITH SUPERVISION
DRESS: INDEPENDENT
HYGIENE/GROOMING: INDEPENDENT
DRESS: INDEPENDENT
HYGIENE/GROOMING: INDEPENDENT

## 2019-01-06 ASSESSMENT — MIFFLIN-ST. JEOR: SCORE: 1367.77

## 2019-01-06 NOTE — PROGRESS NOTES
Pt is out of room on two separate occasions to grab ice water. Pt denied having drooling and no witting noted on her pillow. Pt minimally respond to writer redirection and question during interaction.Pt required consistent encouragement to remain in the lounge area or to get back to her room. Pt slept for 5 hours this shift. Will continue monitoring.

## 2019-01-06 NOTE — PROGRESS NOTES
"Pt was withdrawn to their room for the majority of the evening laying in their bed resting. Pt began the evening eating with their family who came to visit them. During check-in with writer pt denied all mental health symptoms as well as anxiety and depression. When asked how they were feeling pt stated \"I'm fine\".      01/05/19 6387   Behavioral Health   Hallucinations denies / not responding to hallucinations   Thinking confused;poor concentration   Orientation person: oriented;place: oriented   Memory baseline memory   Insight poor   Judgement impaired   Eye Contact at examiner   Affect irritable   Mood mood is calm   Physical Appearance/Attire attire appropriate to age and situation   Hygiene well groomed   Suicidality (Pt denies)   1. Wish to be Dead No   2. Non-Specific Active Suicidal Thoughts  No   Activity isolative;withdrawn   Speech clear   Medication Sensitivity no stated side effects;no observed side effects   Psychomotor / Gait balanced;steady   Psycho Education   Type of Intervention 1:1 intervention   Response participates with encouragement   Hours 0.5   Treatment Detail Check-in     "

## 2019-01-06 NOTE — PLAN OF CARE
"\"I'm better, I'm taking a shower\". Denies depression, anxiety, and hallucinations. Thoughts are more clear and is able to focus and concentrate better. Continues to be flat, slow to respond and soft spoken. Responds in 1-2 word answers. Rarely initiates conversation.  Patient spent most the shift in room. Ate meals in dining room. Watched a little TV. Heart rate elevated this morning at 130 sitting, 141 standing. Lying pulse this afternoon was 108. Parents here at end of shift.   "

## 2019-01-07 LAB
BASOPHILS # BLD AUTO: 0 10E9/L (ref 0–0.2)
BASOPHILS NFR BLD AUTO: 0.4 %
DIFFERENTIAL METHOD BLD: NORMAL
EOSINOPHIL # BLD AUTO: 0.2 10E9/L (ref 0–0.7)
EOSINOPHIL NFR BLD AUTO: 4.8 %
ERYTHROCYTE [DISTWIDTH] IN BLOOD BY AUTOMATED COUNT: 12.8 % (ref 10–15)
HCT VFR BLD AUTO: 35.3 % (ref 35–47)
HGB BLD-MCNC: 12 G/DL (ref 11.7–15.7)
IMM GRANULOCYTES # BLD: 0 10E9/L (ref 0–0.4)
IMM GRANULOCYTES NFR BLD: 0.2 %
LYMPHOCYTES # BLD AUTO: 1.4 10E9/L (ref 0.8–5.3)
LYMPHOCYTES NFR BLD AUTO: 27.9 %
MCH RBC QN AUTO: 27.3 PG (ref 26.5–33)
MCHC RBC AUTO-ENTMCNC: 34 G/DL (ref 31.5–36.5)
MCV RBC AUTO: 80 FL (ref 78–100)
MONOCYTES # BLD AUTO: 0.3 10E9/L (ref 0–1.3)
MONOCYTES NFR BLD AUTO: 5.9 %
NEUTROPHILS # BLD AUTO: 3 10E9/L (ref 1.6–8.3)
NEUTROPHILS NFR BLD AUTO: 60.8 %
NRBC # BLD AUTO: 0 10*3/UL
NRBC BLD AUTO-RTO: 0 /100
PLATELET # BLD AUTO: 207 10E9/L (ref 150–450)
RBC # BLD AUTO: 4.4 10E12/L (ref 3.8–5.2)
WBC # BLD AUTO: 5 10E9/L (ref 4–11)

## 2019-01-07 PROCEDURE — 25000132 ZZH RX MED GY IP 250 OP 250 PS 637: Performed by: STUDENT IN AN ORGANIZED HEALTH CARE EDUCATION/TRAINING PROGRAM

## 2019-01-07 PROCEDURE — 99232 SBSQ HOSP IP/OBS MODERATE 35: CPT | Mod: GC | Performed by: PSYCHIATRY & NEUROLOGY

## 2019-01-07 PROCEDURE — 36415 COLL VENOUS BLD VENIPUNCTURE: CPT | Performed by: STUDENT IN AN ORGANIZED HEALTH CARE EDUCATION/TRAINING PROGRAM

## 2019-01-07 PROCEDURE — 85025 COMPLETE CBC W/AUTO DIFF WBC: CPT | Performed by: STUDENT IN AN ORGANIZED HEALTH CARE EDUCATION/TRAINING PROGRAM

## 2019-01-07 PROCEDURE — 12400001 ZZH R&B MH UMMC

## 2019-01-07 PROCEDURE — 25000132 ZZH RX MED GY IP 250 OP 250 PS 637: Performed by: PSYCHIATRY & NEUROLOGY

## 2019-01-07 RX ADMIN — ACETAMINOPHEN 650 MG: 325 TABLET, FILM COATED ORAL at 10:47

## 2019-01-07 RX ADMIN — LORAZEPAM 2 MG: 2 TABLET ORAL at 22:30

## 2019-01-07 RX ADMIN — DOCUSATE SODIUM 250 MG: 250 CAPSULE, LIQUID FILLED ORAL at 09:28

## 2019-01-07 RX ADMIN — ERGOCALCIFEROL 50000 UNITS: 1.25 CAPSULE ORAL at 22:31

## 2019-01-07 RX ADMIN — LITHIUM CARBONATE 900 MG: 450 TABLET, EXTENDED RELEASE ORAL at 22:30

## 2019-01-07 RX ADMIN — POLYETHYLENE GLYCOL 3350 17 G: 17 POWDER, FOR SOLUTION ORAL at 09:29

## 2019-01-07 RX ADMIN — LORAZEPAM 1 MG: 1 TABLET ORAL at 09:28

## 2019-01-07 RX ADMIN — CLOZAPINE 275 MG: 100 TABLET ORAL at 22:29

## 2019-01-07 RX ADMIN — LORAZEPAM 1 MG: 1 TABLET ORAL at 14:22

## 2019-01-07 ASSESSMENT — ACTIVITIES OF DAILY LIVING (ADL)
LAUNDRY: WITH SUPERVISION
ORAL_HYGIENE: INDEPENDENT
ORAL_HYGIENE: INDEPENDENT
DRESS: INDEPENDENT
HYGIENE/GROOMING: INDEPENDENT
DRESS: INDEPENDENT
LAUNDRY: WITH SUPERVISION
HYGIENE/GROOMING: INDEPENDENT

## 2019-01-07 NOTE — PROGRESS NOTES
Pt come out of her room twice this shift and redirected for safety. Pt has drooling this shift wetting her top scrub; SIO female staff assisteed her to change her scrub, but pt declined to to change; bed pillow case and towels were changed. Pt slept for 6 hours this shift.

## 2019-01-07 NOTE — PROGRESS NOTES
Pt was isolative and withdrawn to her room, did not attend groups. In the morning she appeared fearful of leaving her room, prompted to leave her room to get breakfast and obtain vitals, she stepped out then immediately retreated back into her room. Breakfast and vitals where brought to her room and she was cooperative. Pt denies SI/SIB, hallucinations, and medication side affects. Pt had minimal interaction with staff and peers. She was visited by her parents who helped her call her daughter today.      01/07/19 1154   Behavioral Health   Hallucinations denies / not responding to hallucinations   Thinking distractable;confused   Orientation person: oriented;place: oriented   Memory baseline memory   Insight poor   Judgement impaired   Eye Contact at examiner   Affect blunted, flat   Mood depressed   Physical Appearance/Attire attire appropriate to age and situation   Hygiene other (see comment)  (adequate)   Suicidality other (see comments)  (denies)   1. Wish to be Dead No   2. Non-Specific Active Suicidal Thoughts  No   Self Injury other (see comment)  (denies)   Activity isolative;withdrawn   Speech clear;coherent   Medication Sensitivity no stated side effects;no observed side effects   Psychomotor / Gait balanced;steady   Activities of Daily Living   Hygiene/Grooming independent   Oral Hygiene independent   Dress independent   Laundry with supervision   Room Organization independent   Activity   Activity Assistance Provided independent

## 2019-01-07 NOTE — PROGRESS NOTES
"    ----------------------------------------------------------------------------------------------------------  [facility], [place]   Psychiatric Progress Note  Hospital Day #47     Interim History:   The patient's care was discussed with the treatment team and chart notes were reviewed.    Sleep: 6 hrs  Scheduled Medications: Received.  Interval history:  Patient had initially declined blood draw over the weekend and lithium level was obtained on Sunday. She was also noted to have been \"tense\" in the shower and walked out in her underwear. She has been withdrawn. But overall has been much better with no current reports of mood, anxiety or psychosis. Patient has been saying now that she is \"feeling fine\".   She did not have any physical complaints. Discussed with patient and family about tentative planning for discharge in the next few weeks and family preferred options for day treatment. Will arrange medical transportation as mother will be starting work soon too and only father drives. Parents also request \"food stamps\" and other disability help.  Dannie had been tearful and did not engage when we interviewed her. She stated that when she really wanted to go home. Her father who visited later explained that since yesterday she had been feeling very sad about not seeing her daughter, but had otherwise been okay. Once he came today, she was more talkative and appeared better. He continued to report that she was making improvements. He said the phone call to home had not worked due to time difference, but would try now. Video call may not be possible though.   She did not have any adverse effects with any of her current medications, and no Lithium toxicity noted on increased dose of Lithium.  The risks, benefits, alternatives and side effects of any medication changes have been discussed and are understood by the patient and other caregivers.    Review of Systems:   See HPI for pertinent " "ROS.          Allergies:     Allergies   Allergen Reactions     Septra [Sulfamethoxazole W/Trimethoprim]             Psychiatric Examination:   /87   Pulse 108   Temp 98.9  F (37.2  C) (Oral)   Resp 24   Ht 1.702 m (5' 7\")   Wt 58.5 kg (129 lb)   SpO2 100%   BMI 20.20 kg/m    Weight is 129 lbs 0 oz  Body mass index is 20.2 kg/m .    Appearance: In bed, lying down. Wearing hospital scrubs. Wearing a woolen cap.  Attitude:Co-operative.  Eye Contact: Poor  Mood: Sad  Affect:  Dysphoric, crying  Speech: Slower  Psychomotor Behavior: Slow but fluid movement. No evidence of stereotypic movements, tardive dyskinesia, dystonia or tics. No tremor.   Thought Process: Illogical and confused.  Associations: No loose associations.  Thought Content: Want to go home  Insight: Poor - improving  Judgment: Poor.  Attention Span and Concentration: Limited.  Recent and Remote Memory: Poor recent memory. Difficult to assess remote memory.  Language: Speaking fluent English with strong accent.  Fund of Knowledge: Difficult to assess.  Muscle Strength and Tone: Appearance is grossly normal, not obviously stiff. Mild EPS.  Gait and Station: normal appearing gait, unsteady/shuffling at times.     Labs:     Lithium 1/6/19 - 0.63 mmol/l     Assessment    Diagnostic Impression: Ms. Davis is a 23 y.o. F with a psychiatric history of MDD with psychotic features who presented to the [facility] ED via EMS on 11/21/2018 due to psychosis in the setting of medication non-adherence. Her primary presentation is psychosis, as evidenced by reported and witnessed visual and auditory hallucinations including command hallucinations taking the form of her father, delusions that her father is trying to poison her, stereotypic hand movements, writhing leg movements, echopraxia, and delayed speech latency. Demonstrates emotional lability from calm baseline, with rapid transition to and from sobbing or dancing/singing/laughing. Sleep maintenance is poor " and fluctuating. Given her prior diagnosis of MDD with psychosis and her current presentation with acute psychosis in the absence of depressive sxs, and disorganized thinking, a thought disorder seems to be the most likely pathology. Ddx includes schizoaffective disorder and less likely bipolar depression or MDD with psychotic features. Current symptoms, along with initial improvement with lorazepam suggest component of catatonia to her current unspecified condition.     Hospital Course: Ms. Davis was admitted to [facility] on a 72 hour hold. Treatment team filed for commitment and [facility] which have been received. Olanzapine was started and uptitrated. PRN hydroxyzine and trazodone were initiated. There was some concern for catatonia given stereotypic movements and poor olanzapine response so she was given lorazepam with marked clinical improvement. Scheduled lorazepam was started. Not in full remission with olanzapine so attempted transition to aripiprazole which she has previously taken with good reported response, but she had recurrence of delusions, increased lability, new hyperactivity/agitation, and worsening sleep maintenance, so she is transitioning back to olanzapine. After demonstrating poor symptom improvement, clozapine was started and titrated while olanzapine was tapered off. She was tachycardic in the 120s without symptoms and was closely followed up , with rpt EKG WNL. With background mood lability, hyper-religiosity, periods of psychomotor agitation, Lithium was started on 12/31/18. With sub-therapeutic levels dose was increased to 900 mg/day.      Medical Course: CMP, lipid panel, TSH, B12/folate, CBC, sed rate, lyme, treponema, HIV, EMERALD, ceruloplasmin, ECG WNL. Vitamin D low. UA non-infectious. Demonstrated orthostasis, attributable to up-titration of olanzapine. Repeat CBC w/ diff WNL. Bowel regimen initiated for constipation. Ipratropium spray started for sialorrhea. CMP WNL.    Plan    Principal Diagnosis:   # Unspecified Schizophrenia Spectrum and Other Psychotic Disorder dd -BPAD- Mixed affective with psychosis  # R/O Catatonia    Psychotropic Medications:  Modify:  - Increased Clozapine to 275 mg daily (12/26/18 )  - Lithium 900 mg a day level due on 12/11/19    Continue:  - Olanzapine stopped   - Haldol 5 mg Q4H PRN   - Diphenhydramine 50 mg Q4H PRN  - Lorazepam 2 mg at bedtime, 1 mg BID  - Cogentin 1 mg BID PRN for akathisia/dystonia  - Hydroxyzine 25 mg Q4H PRN   - Ocean spray Q1H PRN for nasal congestion    Options considered if continued poor response - scheduling haloperidol, adding Lithium for suspected mood-catatonia (excited stupor), review depression presentation- serotonin specific reuptake inhibitor trial or  ECT.  At this time, with improvements noted, and noted significant sedation and sialorrhea, consider reduction in Lorazepam after Lithium levels and re-evaluate clozapine dose.  Patient will be treated in therapeutic milieu with appropriate individual and group therapies as described.  WBC counts reviewed and normal.    Medical diagnoses to be addressed this admission:    # Vitamin D Deficiency  - Continue ergocalciferol 50,000 Units Q7 days    # Constipation  - Miralax 17 g daily  - Docusate 250 mg daily  - Mylanta/Maalox QID PRN    # Headache/Neck Pain   - Ibuprofen 400 mg Q6H PRN  - Acetaminophen PRN  - Menthol Topical Analgesic Q6H PRN    # Sialorrhea  Secondary to clozapine.   Strategies like use of towel helping  # Nasal Congestion  - Ocean nasal spray PRN    Data: As above.  Consults: None.    Legal Status: Commitment with [facility].    Safety Assessment:   Behavioral Orders   Procedures     Code 1 - Restrict to Unit     Elopement precautions     Fall precautions     Routine Programming     As clinically indicated     Single Room     Status 15     Every 15 minutes.     Suicide precautions     Patients on Suicide Precautions should have a Combination Diet ordered that  includes a Diet selection(s) AND a Behavioral Tray selection for Safe Tray - with utensils, or Safe Tray - NO utensils         Disposition: TBD pending clinical stabilization and medication optimization.     Patient was seen and discussed with the attending MD Jessica Samson MD,  PGY1 Psychiatry resident    Psychiatry Attending Attestation:  This patient has been seen and evaluated by me, Missy Kiran.  I have discussed this patient with the psychiatry resident and I agree with the findings and plan in this note.    I have reviewed today's vital signs, medications, labs and imaging.   Missy Kiran MD.

## 2019-01-07 NOTE — PROGRESS NOTES
Patient's pulse was elevated this morning, 134 sitting and 144 standing and 122 lying Dr. Kiran and Dr Lim were notified. Lying pulse this afternoon was 122.

## 2019-01-08 LAB
TROPONIN I SERPL-MCNC: <0.015 UG/L (ref 0–0.04)
TSH SERPL DL<=0.005 MIU/L-ACNC: 0.92 MU/L (ref 0.4–4)

## 2019-01-08 PROCEDURE — 93010 ELECTROCARDIOGRAM REPORT: CPT | Performed by: INTERNAL MEDICINE

## 2019-01-08 PROCEDURE — 84443 ASSAY THYROID STIM HORMONE: CPT | Performed by: STUDENT IN AN ORGANIZED HEALTH CARE EDUCATION/TRAINING PROGRAM

## 2019-01-08 PROCEDURE — 25000132 ZZH RX MED GY IP 250 OP 250 PS 637: Performed by: PSYCHIATRY & NEUROLOGY

## 2019-01-08 PROCEDURE — 36415 COLL VENOUS BLD VENIPUNCTURE: CPT | Performed by: STUDENT IN AN ORGANIZED HEALTH CARE EDUCATION/TRAINING PROGRAM

## 2019-01-08 PROCEDURE — 93005 ELECTROCARDIOGRAM TRACING: CPT | Performed by: PHYSICIAN ASSISTANT

## 2019-01-08 PROCEDURE — 12400001 ZZH R&B MH UMMC

## 2019-01-08 PROCEDURE — 99207 ZZC CONSULT E&M CHANGED TO INITIAL LEVEL: CPT | Performed by: PHYSICIAN ASSISTANT

## 2019-01-08 PROCEDURE — 25000132 ZZH RX MED GY IP 250 OP 250 PS 637: Performed by: STUDENT IN AN ORGANIZED HEALTH CARE EDUCATION/TRAINING PROGRAM

## 2019-01-08 PROCEDURE — 84484 ASSAY OF TROPONIN QUANT: CPT | Performed by: STUDENT IN AN ORGANIZED HEALTH CARE EDUCATION/TRAINING PROGRAM

## 2019-01-08 PROCEDURE — 99222 1ST HOSP IP/OBS MODERATE 55: CPT | Performed by: PHYSICIAN ASSISTANT

## 2019-01-08 PROCEDURE — 99231 SBSQ HOSP IP/OBS SF/LOW 25: CPT | Mod: GC | Performed by: PSYCHIATRY & NEUROLOGY

## 2019-01-08 RX ADMIN — LORAZEPAM 1 MG: 1 TABLET ORAL at 08:24

## 2019-01-08 RX ADMIN — DOCUSATE SODIUM 250 MG: 250 CAPSULE, LIQUID FILLED ORAL at 08:24

## 2019-01-08 RX ADMIN — LORAZEPAM 1 MG: 1 TABLET ORAL at 14:42

## 2019-01-08 RX ADMIN — LITHIUM CARBONATE 900 MG: 450 TABLET, EXTENDED RELEASE ORAL at 21:50

## 2019-01-08 RX ADMIN — LORAZEPAM 2 MG: 2 TABLET ORAL at 21:50

## 2019-01-08 RX ADMIN — CLOZAPINE 275 MG: 100 TABLET ORAL at 21:50

## 2019-01-08 NOTE — PROGRESS NOTES
Pt was isolative and withdrawn to her room laying in bed. Pt did not leave her room this evening. Pt ate some of the food her parents brought her earlier. Pt denies SI/SIB, hallucinations, and medication side affects.      01/07/19 2204   Behavioral Health   Thinking distractable   Orientation person: oriented;place: oriented   Memory baseline memory   Insight poor   Judgement impaired   Eye Contact at examiner   Affect blunted, flat   Mood depressed   Physical Appearance/Attire attire appropriate to age and situation   Hygiene other (see comment)  (adequate)   1. Wish to be Dead No   2. Non-Specific Active Suicidal Thoughts  No   Self Injury other (see comment)  (denies)   Activity isolative;withdrawn   Speech coherent   Medication Sensitivity no stated side effects;no observed side effects   Psychomotor / Gait balanced;steady   Activities of Daily Living   Hygiene/Grooming independent   Oral Hygiene independent   Dress independent   Laundry with supervision   Room Organization independent   Activity   Activity Assistance Provided independent

## 2019-01-08 NOTE — CONSULTS
Internal Medicine Initial Visit    Dannie Davis MRN# [id number]   Age: 23 year old YOB: 1995   Date of Admission: 11/21/2018     Reason for consult: Persistent Tachycardia - Concern for Drug Induced (Clozaril) Myocarditis        Requesting physician Dr. Lim      SUBJECTIVE   HPI:   Dannie Davis is a 23 year old female with a history of psychosis (schizophrenia vs BPAD vs other psychotic disorder), asthma, and GERD who was admitted to inpatient behavioral health on 11/21/18 with psychosis in the setting of medication non-adherence. Internal Medicine was consulted today for evaluation of persistent tachycardia with concern for drug induced myocarditis secondary to Clozaril. History limited due to patient's current psychiatric status and obtained primarily from chart review. Appear patient was started on Clozaril 12/10/18 with gradual up-titration since; last dose increase 12/26. Per chart review, she has had intermittent tachycardia since admission which preceded Clozaril initiation. Last documented normal HR was 73-93 the morning of 12/31. Recently, HR has been 120s-130s with occasional readings in low 140s. Most recent EKG (12/28) showed sinus tachycardia. Troponin negative and TSH wnl on 1/8/19. CBC wnl on 1/7/19 without evidence of eosinophilia. Unable to assess for any cardiac symptoms or obtain ROS due to current psychiatric status.     Past Medical History:     Past Medical History:   Diagnosis Date     Asthma     Reported by patient, not on any PTA medications     Gastroesophageal reflux disease      Psychosis (H)     DDx includes Schizophrenia vs BPAD vs other Psychotic Disorder      Reviewed & updated in [facility].     Past Surgical History:   Denies any prior surgeries.     Medications prior to admission:   None      Allergies:     Allergies   Allergen Reactions     Septra [Sulfamethoxazole W/Trimethoprim]          Social History:   Denies tobacco, alcohol, or illicit drug use.      "Family History:   Reviewed and is unknown by patient.     Review of Systems:   Unable to perform ROS due to current psychiatric status.    OBJECTIVE   Physical Exam:   Blood pressure 90/74, pulse 123, temperature 98.8  F (37.1  C), temperature source Oral, resp. rate 20, height 1.702 m (5' 7\"), weight 58.5 kg (129 lb), SpO2 100 %.  General: Awake. Non-toxic appearing. NAD.  HEENT: NC/AT. Anicteric sclera. Eyes symmetric and free of discharge bilaterally. Mucous membranes moist.  Cardiovascular: Tachycardic. Regular rhythm with S1,S2. No murmurs appreciated.  Lungs: Normal respiratory effort on RA. Lungs CTAB without wheezes, rales, or rhonchi.  Abdomen: Soft, non-tender, and nondistended with bowel sounds present.  Extremities: Warm & well perfused. No peripheral edema.  Skin: No rashes, jaundice, or lesions on exposed areas of skin.     Laboratory Data:     CBC  Recent Labs   Lab 01/07/19  0854 01/02/19  0800   WBC 5.0 5.3   RBC 4.40 4.74   HGB 12.0 12.7   HCT 35.3 37.6   MCV 80 79   MCH 27.3 26.8   MCHC 34.0 33.8   RDW 12.8 12.7    212       TSH  Recent Labs   Lab 01/08/19  1037   TSH 0.92       Troponin <0.015 on 1/8/19     Assessment and Plan/Recommendations:   Dannie Davis is a 23 year old female with a history of psychosis (schizophrenia vs BPAD vs other psychotic disorder), asthma, and GERD who was admitted to inpatient behavioral health on 11/21/18 with psychosis in the setting of medication non-adherence.     Acute Psychosis - In setting of medication non-adherence. DDx includes schizophrenia vs BPAD vs other psychotic disorder per Psychiatry notes.  - Management per Psychiatry.    Tachycardia - Intermittent since admission. Onset preceded Clozaril initiation on 12/10. Recent HR in 120s-130s with occasional readings in low 140s. Most recent EKG (12/28) with sinus tachycardia. Troponin negative and TSH wnl 1/8/18. CBC wnl on 1/7/19 without eosinophilia. Unable to assess for symptoms given " psychiatric decompensation. Low clinical suspicion for myocarditis given afebrile with normal Troponin and eosinophil count.  - Repeat EKG   - Echocardiogram to evaluate for cardiomyopathy  - Consider low dose beta blocker pending above results  - Discussed with Dr. Mclean and the above reflects our joint plan    Hx of Asthma - Reported by patient. Not on PTA medications. Unable to assess symptoms given psychiatric decompensation. Lungs CTAB on exam.  - No indication for medication initiation at this time. Notify Medicine and consider starting Albuterol inhaler PRN if SOB or wheezing develop.  - Follow up with PCP for OP management    Sialorrhea - Reported in Psychiatry notes and attributed to Clozaril. Ipratropium spray started by Psychiatry and subsequently discontinued d/t tachycardia.  - Management per Psychiatry. Notify Medicine if assistance required.    Hx of Headache/Neck Pain - Reported in Psychiatry notes. Unable to assess symptom status given psychiatric decompensation.  - Continue Tylenol PRN, Ibuprofen PRN, and Icy Hot PRN  - Notify Medicine if new/worsening symptoms    Constipation - Reported in Psychiatry notes. Likely due to medications. Unable to assess symptom status given psychiatric decompensation. Abdominal exam benign.  - Continue scheduled Miralax and Docusate  - Notify Medicine if worsening symptoms, n/v, or abdominal pain develop    GERD - Documented in EMR. Not on any PTA or current meds. Unable to assess for symptoms given psychiatric decompensation.  - If symptoms develop, would start Tums PRN followed by scheduled Zantac if ineffective.    Vitamin D Deficiency - Vitamin D level 17 on 11/22/18.  - Continue Ergocalciferol 50,000 units q 7 days.    Medicine will continue to follow.     Kristel Barahona PA-C  Hospitalist Service  [phone]

## 2019-01-08 NOTE — PROGRESS NOTES
"    ----------------------------------------------------------------------------------------------------------  [facility], [place]   Psychiatric Progress Note  Hospital Day #48     Interim History:   The patient's care was discussed with the treatment team and chart notes were reviewed.    Sleep: Around 6 hrs  Scheduled Medications: Received.  Interval history:  Patient had been withdrawn and isolative. Has mostly been in her room when parents are not visiting. Was able to call her daughter with the help of her parents. Eating her meals and taking care of self now. No psychotic symptoms elaborated. She was sitting up chewing gum and talking to her father today. She had also just spoken to her grandmother. She said that she was feeling better and was more reactive. She joked with her father, when she corrected his grammar once.  Team discussed plan for discharge later part of next week, with day treatment options and Dannie agreed with this plan.  She did not have any adverse effects with any of her current medications, and no Lithium toxicity noted on increased dose of Lithium.  The risks, benefits, alternatives and side effects of any medication changes have been discussed and are understood by the patient and other caregivers.    Review of Systems:   See HPI for pertinent ROS.          Allergies:     Allergies   Allergen Reactions     Septra [Sulfamethoxazole W/Trimethoprim]             Psychiatric Examination:   /83   Pulse 122   Temp 98.8  F (37.1  C) (Oral)   Resp 20   Ht 1.702 m (5' 7\")   Wt 58.5 kg (129 lb)   SpO2 100%   BMI 20.20 kg/m    Weight is 129 lbs 0 oz  Body mass index is 20.2 kg/m .    Appearance: Sitting up, Wearing hospital scrubs and has a weave in.  Attitude:Co-operative.  Eye Contact: good  Mood: better  Affect:  More reactive, still restricted range  Speech: more verbal today  Psychomotor Behavior: No retardation of movement. No evidence of " stereotypic movements, tardive dyskinesia, dystonia or tics. No tremor.   Thought Process: linear.  Associations: No loose associations.  Thought Content: no psychosis  Insight: Poor - improving  Judgment: Poor.  Attention Span and Concentration: better.  Recent and Remote Memory: Recent memory intact.  Language: Speaking fluent English with strong accent.  Fund of Knowledge: Difficult to assess.  Muscle Strength and Tone: Appearance is grossly normal, not obviously stiff. Mild EPS.  Gait and Station: normal appearing gait     Labs:     Lithium 1/6/19 - 0.63 mmol/l     Assessment    Diagnostic Impression: Ms. Davis is a 23 y.o. F with a psychiatric history of MDD with psychotic features who presented to the [facility] ED via EMS on 11/21/2018 due to psychosis in the setting of medication non-adherence. Her primary presentation is psychosis, as evidenced by reported and witnessed visual and auditory hallucinations including command hallucinations taking the form of her father, delusions that her father is trying to poison her, stereotypic hand movements, writhing leg movements, echopraxia, and delayed speech latency. Demonstrates emotional lability from calm baseline, with rapid transition to and from sobbing or dancing/singing/laughing. Sleep maintenance is poor and fluctuating. Given her prior diagnosis of MDD with psychosis and her current presentation with acute psychosis in the absence of depressive sxs, and disorganized thinking, a thought disorder seems to be the most likely pathology. Ddx includes schizoaffective disorder and less likely bipolar depression or MDD with psychotic features. Current symptoms, along with initial improvement with lorazepam suggest component of catatonia to her current unspecified condition.     Hospital Course: Ms. Davis was admitted to [facility] on a 72 hour hold. Treatment team filed for commitment and [facility] which have been received. Olanzapine was started and uptitrated. PRN  hydroxyzine and trazodone were initiated. There was some concern for catatonia given stereotypic movements and poor olanzapine response so she was given lorazepam with marked clinical improvement. Scheduled lorazepam was started. Not in full remission with olanzapine so attempted transition to aripiprazole which she has previously taken with good reported response, but she had recurrence of delusions, increased lability, new hyperactivity/agitation, and worsening sleep maintenance, so she is transitioning back to olanzapine. After demonstrating poor symptom improvement, clozapine was started and titrated while olanzapine was tapered off. She was tachycardic in the 120s without symptoms and was closely followed up , with rpt EKG WNL. With background mood lability, hyper-religiosity, periods of psychomotor agitation, Lithium was started on 12/31/18. With sub-therapeutic levels dose was increased to 900 mg/day.      Medical Course: CMP, lipid panel, TSH, B12/folate, CBC, sed rate, lyme, treponema, HIV, EMERALD, ceruloplasmin, ECG WNL. Vitamin D low. UA non-infectious. Demonstrated orthostasis, attributable to up-titration of olanzapine. Repeat CBC w/ diff WNL. Bowel regimen initiated for constipation. Ipratropium spray started for sialorrhea. CMP WNL.    Plan   Principal Diagnosis:   # Unspecified Schizophrenia Spectrum and Other Psychotic Disorder dd -BPAD- Mixed affective with psychosis  # R/O Catatonia    Psychotropic Medications:  Modify:  - Increased Clozapine to 275 mg daily (12/26/18 )  - Lithium 900 mg a day level due on 12/11/19    Continue:  - Olanzapine stopped   - Haldol 5 mg Q4H PRN   - Diphenhydramine 50 mg Q4H PRN  - Lorazepam 2 mg at bedtime, 1 mg BID  - Cogentin 1 mg BID PRN for akathisia/dystonia  - Hydroxyzine 25 mg Q4H PRN   - Ocean spray Q1H PRN for nasal congestion    Options considered if continued poor response - scheduling haloperidol, adding Lithium for suspected mood-catatonia (excited stupor),  review depression presentation- serotonin specific reuptake inhibitor trial or  ECT.  At this time, with improvements noted, and noted significant sedation and sialorrhea, consider reduction in Lorazepam after Lithium levels and re-evaluate clozapine dose.  Patient will be treated in therapeutic milieu with appropriate individual and group therapies as described.  WBC counts reviewed and normal.    Medical diagnoses to be addressed this admission:    # Vitamin D Deficiency  - Continue ergocalciferol 50,000 Units Q7 days    # Constipation  - Miralax 17 g daily  - Docusate 250 mg daily  - Mylanta/Maalox QID PRN    # Headache/Neck Pain   - Ibuprofen 400 mg Q6H PRN  - Acetaminophen PRN  - Menthol Topical Analgesic Q6H PRN    # Sialorrhea  Secondary to clozapine.   Strategies like use of towel helping  # Nasal Congestion  - Ocean nasal spray PRN    # tachycardia  - concern for myocarditis  - HR almost double baseline 71 b/m  - cardiac enzymes today, follow up any eosinophilia in rpt counts  - asymptomatic still - EKGs have been normal. Will follow up need for ECHO- literature review suggest cardiac MRI as gold standard for diagnostics (other than biopsy)  - IM consult  - Ipratropium stopped - not to restart  - follow up enzymes - may need to trend, note that did not send when symptomatic as no symptom elaborated (which may be hard due to mental status)      Data: As above.  Consults: None.    Legal Status: Commitment with [facility].    Safety Assessment:   Behavioral Orders   Procedures     Code 1 - Restrict to Unit     Elopement precautions     Fall precautions     Routine Programming     As clinically indicated     Single Room     Status 15     Every 15 minutes.     Suicide precautions     Patients on Suicide Precautions should have a Combination Diet ordered that includes a Diet selection(s) AND a Behavioral Tray selection for Safe Tray - with utensils, or Safe Tray - NO utensils         Disposition: TBD pending clinical  stabilization and medication optimization.     Patient was seen and discussed with the attending MD Jessica Samson MD,  PGY1 Psychiatry resident    Psychiatry Attending Attestation:  This patient has been seen and evaluated by me, Missy Kiran.  I have discussed this patient with the psychiatry resident and I agree with the findings and plan in this note.    I have reviewed today's vital signs, medications, labs and imaging.   Missy Kiran MD.

## 2019-01-09 LAB — INTERPRETATION ECG - MUSE: NORMAL

## 2019-01-09 PROCEDURE — 99232 SBSQ HOSP IP/OBS MODERATE 35: CPT | Mod: GC | Performed by: PSYCHIATRY & NEUROLOGY

## 2019-01-09 PROCEDURE — 25000132 ZZH RX MED GY IP 250 OP 250 PS 637: Performed by: PSYCHIATRY & NEUROLOGY

## 2019-01-09 PROCEDURE — 25000132 ZZH RX MED GY IP 250 OP 250 PS 637: Performed by: STUDENT IN AN ORGANIZED HEALTH CARE EDUCATION/TRAINING PROGRAM

## 2019-01-09 PROCEDURE — 12400001 ZZH R&B MH UMMC

## 2019-01-09 RX ORDER — LORAZEPAM 1 MG/1
1 TABLET ORAL AT BEDTIME
Status: DISCONTINUED | OUTPATIENT
Start: 2019-01-09 | End: 2019-01-18 | Stop reason: HOSPADM

## 2019-01-09 RX ADMIN — LORAZEPAM 1 MG: 1 TABLET ORAL at 21:27

## 2019-01-09 RX ADMIN — LITHIUM CARBONATE 900 MG: 450 TABLET, EXTENDED RELEASE ORAL at 21:27

## 2019-01-09 RX ADMIN — LORAZEPAM 1 MG: 1 TABLET ORAL at 21:26

## 2019-01-09 RX ADMIN — LORAZEPAM 1 MG: 1 TABLET ORAL at 15:48

## 2019-01-09 RX ADMIN — LORAZEPAM 1 MG: 1 TABLET ORAL at 10:37

## 2019-01-09 RX ADMIN — CLOZAPINE 275 MG: 100 TABLET ORAL at 21:27

## 2019-01-09 RX ADMIN — DOCUSATE SODIUM 250 MG: 250 CAPSULE, LIQUID FILLED ORAL at 10:37

## 2019-01-09 NOTE — PROGRESS NOTES
"    ----------------------------------------------------------------------------------------------------------  [facility], [place]   Psychiatric Progress Note  Hospital Day #49     Interim History:   The patient's care was discussed with the treatment team and chart notes were reviewed.    Sleep: Around 6.5 hrs  Scheduled Medications: Received.  Interval history:  Seen by Medicine for Tachycardia, rpt EKG normal, ECHO done. Will follow up with recs for beta-blocker.  Patient has been noted by staff as looking depressed. Sits with bible on lap but doesn't appear to be reading.  She was interviewed in her room and does appear to be sedated and in bed when not awake with family or for meals. It is hard to fix on catatonia but does appear to be more sedated as her other symptoms resolved and clinical assessments do not show other signs of catatonia. She has been noted to be depressed but also exhibits mood lability, like joking inappropriately with interviewer about being hungry and asking for food, laughing out of context at times.   She did not have any adverse effects with any of her current medications, and no Lithium toxicity noted on increased dose of Lithium.  The risks, benefits, alternatives and side effects of any medication changes have been discussed and are understood by the patient and other caregivers.    Review of Systems:   See HPI for pertinent ROS.          Allergies:     Allergies   Allergen Reactions     Septra [Sulfamethoxazole W/Trimethoprim]             Psychiatric Examination:   /66 (BP Location: Right arm)   Pulse 128   Temp 99.4  F (37.4  C) (Oral)   Resp 16   Ht 1.702 m (5' 7\")   Wt 58.5 kg (129 lb)   SpO2 100%   BMI 20.20 kg/m    Weight is 129 lbs 0 oz  Body mass index is 20.2 kg/m .    Appearance: Sitting up, Wearing hospital scrubs and has a weave in.  Attitude:Co-operative.  Eye Contact: good  Mood: better  Affect:  More reactive, still " restricted range  Speech: more verbal today  Psychomotor Behavior: No retardation of movement. No evidence of stereotypic movements, tardive dyskinesia, dystonia or tics. No tremor.   Thought Process: linear.  Associations: No loose associations.  Thought Content: no psychosis  Insight: Poor - improving  Judgment: Poor.  Attention Span and Concentration: better.  Recent and Remote Memory: Recent memory intact.  Language: Speaking fluent English with strong accent.  Fund of Knowledge: Difficult to assess.  Muscle Strength and Tone: Appearance is grossly normal, not obviously stiff. Mild EPS.  Gait and Station: normal appearing gait     Labs:     Lithium 1/6/19 - 0.63 mmol/l     Assessment    Diagnostic Impression: Ms. Davis is a 23 y.o. F with a psychiatric history of MDD with psychotic features who presented to the [facility] ED via EMS on 11/21/2018 due to psychosis in the setting of medication non-adherence. Her primary presentation is psychosis, as evidenced by reported and witnessed visual and auditory hallucinations including command hallucinations taking the form of her father, delusions that her father is trying to poison her, stereotypic hand movements, writhing leg movements, echopraxia, and delayed speech latency. Demonstrates emotional lability from calm baseline, with rapid transition to and from sobbing or dancing/singing/laughing. Sleep maintenance is poor and fluctuating. Given her prior diagnosis of MDD with psychosis and her current presentation with acute psychosis in the absence of depressive sxs, and disorganized thinking, a thought disorder seems to be the most likely pathology. Ddx includes schizoaffective disorder and less likely bipolar depression or MDD with psychotic features. Current symptoms, along with initial improvement with lorazepam suggest component of catatonia to her current unspecified condition.     Hospital Course: Ms. Davis was admitted to [facility] on a 72 hour hold. Treatment  team filed for commitment and [facility]ro which have been received. Olanzapine was started and uptitrated. PRN hydroxyzine and trazodone were initiated. There was some concern for catatonia given stereotypic movements and poor olanzapine response so she was given lorazepam with marked clinical improvement. Scheduled lorazepam was started. Not in full remission with olanzapine so attempted transition to aripiprazole which she has previously taken with good reported response, but she had recurrence of delusions, increased lability, new hyperactivity/agitation, and worsening sleep maintenance, so she is transitioning back to olanzapine. After demonstrating poor symptom improvement, clozapine was started and titrated while olanzapine was tapered off. She was tachycardic in the 120s without symptoms and was closely followed up , with rpt EKG WNL. With background mood lability, hyper-religiosity, periods of psychomotor agitation, Lithium was started on 12/31/18. With sub-therapeutic levels dose was increased to 900 mg/day. With concern of excess sedation and clinical assessment of ongoing mood lability and no clear other catatonia, night lorazepam was reduced and close monitoring planned.      Medical Course: CMP, lipid panel, TSH, B12/folate, CBC, sed rate, lyme, treponema, HIV, EMERALD, ceruloplasmin, ECG WNL. Vitamin D low. UA non-infectious. Demonstrated orthostasis, attributable to up-titration of olanzapine. Repeat CBC w/ diff WNL. Bowel regimen initiated for constipation. Ipratropium spray started for sialorrhea stopped as tachycardic. CMP WNL.    Plan   Principal Diagnosis:   # Unspecified Schizophrenia Spectrum and Other Psychotic Disorder dd -BPAD- Mixed affective with psychosis  # R/O Catatonia    Psychotropic Medications:  Modify:  - Increased Clozapine to 275 mg daily (12/26/18 )  - Lithium 900 mg a day level due on 12/11/19    Continue:  - Olanzapine stopped   - Haldol 5 mg Q4H PRN   - Diphenhydramine 50 mg Q4H  PRN  - Lorazepam 1 mg TID  - Cogentin 1 mg BID PRN for akathisia/dystonia  - Hydroxyzine 25 mg Q4H PRN   - Ocean spray Q1H PRN for nasal congestion    Options considered if continued poor response - scheduling haloperidol, adding Lithium for suspected mood-catatonia (excited stupor), review depression presentation- serotonin specific reuptake inhibitor trial or  ECT.  At this time, with improvements noted, and noted significant sedation and sialorrhea, consider reduction in Lorazepam after Lithium levels and re-evaluate clozapine dose.  Patient will be treated in therapeutic milieu with appropriate individual and group therapies as described.  WBC counts reviewed and normal.    Medical diagnoses to be addressed this admission:    # Vitamin D Deficiency  - Continue ergocalciferol 50,000 Units Q7 days    # Constipation  - Miralax 17 g daily  - Docusate 250 mg daily  - Mylanta/Maalox QID PRN    # Headache/Neck Pain   - Ibuprofen 400 mg Q6H PRN  - Acetaminophen PRN  - Menthol Topical Analgesic Q6H PRN    # Sialorrhea  Secondary to clozapine.   Strategies like use of towel helping  # Nasal Congestion  - Ocean nasal spray PRN    # tachycardia  - concern for myocarditis  - HR almost double baseline 71 b/m  - cardiac enzymes normal, follow up any eosinophilia in rpt counts  - asymptomatic still - EKGs have been normal. Will follow up need for ECHO- literature review suggest cardiac MRI as gold standard for diagnostics (other than biopsy)  - IM consult appreciate thorough review and follow up  - Ipratropium stopped - not to restart  - May need to trend, note that did not send when symptomatic as no symptom elaborated (which may be hard due to mental status)  - Will follow up IM recs for Beta-blocker      Data: As above.  Consults: None.    Legal Status: Commitment with [facility].    Safety Assessment:   Behavioral Orders   Procedures     Code 1 - Restrict to Unit     Elopement precautions     Fall precautions     Routine  Programming     As clinically indicated     Single Room     Status 15     Every 15 minutes.     Suicide precautions     Patients on Suicide Precautions should have a Combination Diet ordered that includes a Diet selection(s) AND a Behavioral Tray selection for Safe Tray - with utensils, or Safe Tray - NO utensils         Disposition: TBD pending clinical stabilization and medication optimization.     Patient was seen and discussed with the attending MD Jessica Samson MD,  PGY1 Psychiatry resident    Psychiatry Attending Attestation:  This patient has been seen and evaluated by me, Missy Kiran.  I have discussed this patient with the psychiatry resident and I agree with the findings and plan in this note.    I have reviewed today's vital signs, medications, labs and imaging.   Missy Kiran MD.

## 2019-01-09 NOTE — PROGRESS NOTES
"   01/09/19 1417   Behavioral Health   Hallucinations denies / not responding to hallucinations   Thinking distractable   Orientation person: oriented;place: oriented;date, disoriented;time, disoriented   Insight insight appropriate to situation   Judgement impaired   Eye Contact at examiner   Affect other (see comments)  (neutral)   Mood mood is calm   Physical Appearance/Attire attire appropriate to age and situation   Hygiene well groomed   Suicidality (Denies)   1. Wish to be Dead No   2. Non-Specific Active Suicidal Thoughts  No   Self Injury (None observed or reported)   Activity isolative;withdrawn   Speech clear;coherent   Medication Sensitivity no stated side effects     Pt has been resting in her room most of the shift. Affect is neutral. Calm and controlled behavior. Pt had father drop food again. Pt is brief, but pleasant and polite during interactions. Pt reported she is, \"fine,\" and was tiered today. Says her goal is to try to make it to one group. Denies thoughts to hurt herself or others. Contracts for safety.   "

## 2019-01-09 NOTE — PLAN OF CARE
Patient isolated to room all day and evening. Does not respond verbally when approached by staff. Mood is depressed. Seen crying at times. Patient eating in her room only. She is med complaint. Betty Freeman RN

## 2019-01-09 NOTE — PROGRESS NOTES
RE -legal     Court hearing regarding criminal matter    patient had a hearing previously scheduled for 12/20/18. While on [place] treatment team [facility] did submit letter that patient could not attend. This writer followed up and learned that the court had received the correspondence however patient was still noted as failing to appear to hearing and a warrant was issued.    This writer called [facility] and was forwarded to  staff at [facility] center spoke with  staff Jennifer regarding the manner. She did acknowledge she can see the letter, this writer also verified that patient has been in the hospital since 11/21/18 that on 11/28/18 [facility] issued a court hold as patient's mental status has required petition, and that on 12/6/18 an order for commitment was issued. Stated that patient has not been clinically stable for discharge and if she wanted to leave the unit to attend court she would not have been allowed as is now held here on a civil court ordered committment. Per Jennifer in order to resolve she needs to verify with the  but will have to pick a new date. She asked if it is reasonable to schedule around the 3rd week of February which I said yes. She asked to verify address for mailing new notice as well. Per Jennifer she will also call me back to inform me of the new date so can inform patient and family and also let them know there will be a notice. The Warrant will also be quashed.    This writer will update when here back from court.     Addendum   This writer received call back from Jennifer, she confirms that the warrant has been quashed and new hearing is scheduled as follows:    Friday February 22, 2019 - 9:15 am   Hearing will be at the [facility]   A notice will be sent to patient's home.

## 2019-01-09 NOTE — PROGRESS NOTES
Pt appears to have slept 6.5 hrs this shift.  Minimal/scant drooling noted on linens however linens were changed due to food/juice stains on them.  Writer attempted different times to engage pt in conversation and pt remained mute the entire time.  While awake, pt spent majority of time sitting in her room, bible in lap, however did not appear to be actually reading it.  Will continue to monitor and support plan of care.

## 2019-01-10 PROCEDURE — 25000132 ZZH RX MED GY IP 250 OP 250 PS 637: Performed by: STUDENT IN AN ORGANIZED HEALTH CARE EDUCATION/TRAINING PROGRAM

## 2019-01-10 PROCEDURE — 99232 SBSQ HOSP IP/OBS MODERATE 35: CPT | Mod: GC | Performed by: PSYCHIATRY & NEUROLOGY

## 2019-01-10 PROCEDURE — 25000132 ZZH RX MED GY IP 250 OP 250 PS 637: Performed by: PSYCHIATRY & NEUROLOGY

## 2019-01-10 PROCEDURE — 12400001 ZZH R&B MH UMMC

## 2019-01-10 RX ADMIN — POLYETHYLENE GLYCOL 3350 17 G: 17 POWDER, FOR SOLUTION ORAL at 10:08

## 2019-01-10 RX ADMIN — DOCUSATE SODIUM 250 MG: 250 CAPSULE, LIQUID FILLED ORAL at 10:08

## 2019-01-10 RX ADMIN — LORAZEPAM 1 MG: 1 TABLET ORAL at 17:30

## 2019-01-10 RX ADMIN — LITHIUM CARBONATE 900 MG: 450 TABLET, EXTENDED RELEASE ORAL at 21:45

## 2019-01-10 RX ADMIN — CLOZAPINE 275 MG: 100 TABLET ORAL at 21:45

## 2019-01-10 RX ADMIN — LORAZEPAM 1 MG: 1 TABLET ORAL at 21:45

## 2019-01-10 RX ADMIN — LORAZEPAM 1 MG: 1 TABLET ORAL at 10:08

## 2019-01-10 ASSESSMENT — ACTIVITIES OF DAILY LIVING (ADL)
ORAL_HYGIENE: INDEPENDENT
DRESS: STREET CLOTHES
LAUNDRY: UNABLE TO COMPLETE
HYGIENE/GROOMING: INDEPENDENT

## 2019-01-10 ASSESSMENT — MIFFLIN-ST. JEOR: SCORE: 1354.16

## 2019-01-10 NOTE — PLAN OF CARE
BEHAVIORAL TEAM DISCUSSION    Participants: Missy Kiran MD Attending Psychiatrist  Jessica Lim MD PGY1  Cruz Pacheco MD PGY1  GABO Shen, [facility] Clinical Treatment Coordinator  Betty Freeman RN    Progress: progress towards goal functioning since admission overall slow with recent improvements.   Continued Stay Criteria/Rationale: medication management is ongoing assessment for medication optimization is ongoing. Development of safe discharge plan is ongoing.  Medical/Physical: Per psychiatry physician progress note:   Precautions:   Behavioral Orders   Procedures    Code 1 - Restrict to Unit    Elopement precautions    Fall precautions    Routine Programming     As clinically indicated    Single Room    Status 15     Every 15 minutes.    Suicide precautions     Patients on Suicide Precautions should have a Combination Diet ordered that includes a Diet selection(s) AND a Behavioral Tray selection for Safe Tray - with utensils, or Safe Tray - NO utensils       Plan: continue with medication management and assessment on unit. Pt is committed with [facility]. Likely discharge disposition to be to home with out patient day treatment. patient has had recent improvements and has only been minimally able to engage in interactions with others and in group setting. Will continue to monitor and encourage participation in order to assess readiness for discharge as day treatment participation will be essential for ongoing structure and oversight to ensure no acute relapse and rehospitalization.    Rationale for change in precautions or plan: per ongoing assessment.

## 2019-01-10 NOTE — PROGRESS NOTES
Pt did not leave her room all shift and slept completely under the blankets. Staff attempted to get pt to come out of their room for dinner but they refused.

## 2019-01-10 NOTE — PROGRESS NOTES
"  Patient had a decent shift. Her dad visited and the dad said the visit went well. She speaks only 1 - 2 words at a time. When asked if staff could help, she responds, \"No.\" She stays in her room except to come and get meals. She appeared a little disoriented at breakfast time. Appeared a little better around noon.   "

## 2019-01-11 ENCOUNTER — APPOINTMENT (OUTPATIENT)
Dept: CARDIOLOGY | Facility: CLINIC | Age: 24
End: 2019-01-11
Payer: COMMERCIAL

## 2019-01-11 LAB — LITHIUM SERPL-SCNC: 0.84 MMOL/L (ref 0.6–1.2)

## 2019-01-11 PROCEDURE — 25000132 ZZH RX MED GY IP 250 OP 250 PS 637: Performed by: PSYCHIATRY & NEUROLOGY

## 2019-01-11 PROCEDURE — 93306 TTE W/DOPPLER COMPLETE: CPT

## 2019-01-11 PROCEDURE — 93306 TTE W/DOPPLER COMPLETE: CPT | Mod: 26 | Performed by: INTERNAL MEDICINE

## 2019-01-11 PROCEDURE — 25000132 ZZH RX MED GY IP 250 OP 250 PS 637: Performed by: STUDENT IN AN ORGANIZED HEALTH CARE EDUCATION/TRAINING PROGRAM

## 2019-01-11 PROCEDURE — 80178 ASSAY OF LITHIUM: CPT | Performed by: STUDENT IN AN ORGANIZED HEALTH CARE EDUCATION/TRAINING PROGRAM

## 2019-01-11 PROCEDURE — 12400001 ZZH R&B MH UMMC

## 2019-01-11 PROCEDURE — 36415 COLL VENOUS BLD VENIPUNCTURE: CPT | Performed by: STUDENT IN AN ORGANIZED HEALTH CARE EDUCATION/TRAINING PROGRAM

## 2019-01-11 PROCEDURE — 99232 SBSQ HOSP IP/OBS MODERATE 35: CPT | Mod: GC | Performed by: PSYCHIATRY & NEUROLOGY

## 2019-01-11 RX ADMIN — LORAZEPAM 1 MG: 1 TABLET ORAL at 21:07

## 2019-01-11 RX ADMIN — LITHIUM CARBONATE 1200 MG: 300 TABLET, EXTENDED RELEASE ORAL at 21:08

## 2019-01-11 RX ADMIN — POLYETHYLENE GLYCOL 3350 17 G: 17 POWDER, FOR SOLUTION ORAL at 10:54

## 2019-01-11 RX ADMIN — CLOZAPINE 275 MG: 100 TABLET ORAL at 21:06

## 2019-01-11 RX ADMIN — LORAZEPAM 1 MG: 1 TABLET ORAL at 10:53

## 2019-01-11 RX ADMIN — LORAZEPAM 1 MG: 1 TABLET ORAL at 15:03

## 2019-01-11 RX ADMIN — DOCUSATE SODIUM 250 MG: 250 CAPSULE, LIQUID FILLED ORAL at 10:54

## 2019-01-11 ASSESSMENT — ACTIVITIES OF DAILY LIVING (ADL)
DRESS: INDEPENDENT
HYGIENE/GROOMING: INDEPENDENT
ORAL_HYGIENE: INDEPENDENT
LAUNDRY: UNABLE TO COMPLETE
HYGIENE/GROOMING: INDEPENDENT
ORAL_HYGIENE: INDEPENDENT
DRESS: INDEPENDENT

## 2019-01-11 NOTE — PROGRESS NOTES
"Pt was observed withdrawn to their room for the majority of the evening. Pt was observed to only use a few words when communicating with staff. Later in the evening pt was observed walking the freedman and approached the housekeeping closet, and tried to open the door. As staff approached the pt as they were attempting to open the door they stated \"I want to take shower\". Pt was redirected to get their things and went in the shower and was observed to remain in the shower for a long period of time. When staff checked on patient they were observed sitting on the stool in the shower fully dressed holding a towel in their lap. When door to the shower was opened pt stated \"my dad\". Pt was prompted to leave the shower and returned to their room. Pt denies hallucinations as well as SI and SIB.      01/10/19 2055   Behavioral Health   Hallucinations denies / not responding to hallucinations   Thinking poor concentration;distractable   Orientation person: oriented;place: oriented   Memory baseline memory   Insight poor   Judgement impaired   Eye Contact at examiner;staring   Affect blunted, flat   Mood mood is calm   Physical Appearance/Attire attire appropriate to age and situation   Hygiene well groomed  (Showered)   Activity isolative;withdrawn   Medication Sensitivity sedation   Psychomotor / Gait balanced;steady   Psycho Education   Type of Intervention 1:1 intervention   Response participates with cues/redirection   Hours 0.5   Treatment Detail Check-in     "

## 2019-01-11 NOTE — PROGRESS NOTES
Pt remained in her room during this shift; writer assisted pt in changing  her bed pillow case due to drooling. Pt is cooperative with care. Pt slept for 6.75 hours this shift. Will continue monitoring.

## 2019-01-11 NOTE — PROGRESS NOTES
Pt had a blood draw which she initially refused but eventually complied to.  Pt's father stated that she doesn't like to do blood draws even though its been explained to her that it is needed.  Pt ate food father brought in and had an echo done.  Pt then spent some time reading and coloring in her room and hand washed her sweater.  Pt attended OT group later in the day.  Pt's goal for the day is to shower.  Denies hallucinations, SI/SIB and medication sensitivity.         01/11/19 1300   Behavioral Health   Hallucinations denies / not responding to hallucinations   Thinking poor concentration   Orientation person: oriented;place: oriented   Insight poor   Judgement impaired   Eye Contact at examiner   Affect blunted, flat   Mood mood is calm   Physical Appearance/Attire attire appropriate to age and situation   Hygiene other (see comment)  (fair)   Suicidality other (see comments)  (denies)   1. Wish to be Dead No   2. Non-Specific Active Suicidal Thoughts  No   Self Injury other (see comment)  (denies)   Activity isolative;withdrawn   Speech clear;coherent   Medication Sensitivity no stated side effects;no observed side effects   Psychomotor / Gait balanced;steady   Psycho Education   Type of Intervention 1:1 intervention   Response participates, initiates socially appropriate   Hours 0.5   Treatment Detail check in   Activities of Daily Living   Hygiene/Grooming independent   Oral Hygiene independent   Dress independent   Room Organization independent

## 2019-01-11 NOTE — PROGRESS NOTES
Brief Medicine Note    Dannie Davis is a 23 year old female with a history of psychosis (schizophrenia vs BPAD vs other psychotic disorder), asthma, and GERD who was admitted to inpatient behavioral health on 11/21/18 with psychosis in the setting of medication non-adherence. Medicine following for persistent tachycardia.    Sinus Tachycardia - Intermittent since admission. Onset preceded Clozaril initiation on 12/10. -150 over past 24 hours. EKG x 2 (12/28 & 1/8) showed sinus tachycardia. Troponin negative and TSH wnl 1/8/19. CBC wnl on 1/7/19 without eosinophilia. Unable to assess for symptoms given psychiatric decompensation. Low clinical suspicion for myocarditis given afebrile with normal Troponin and eosinophil count. Suspect tachycardia is multifactorial r/t psychiatric decompensation and Clozaril use.  - Ensure adequate hydration   - Echocardiogram ordered since 1/8 to evaluate for cardiomyopathy in setting of Clozaril use. Delay in obtaining d/t patient's lack of cooperation per RN staff. Planning to reattempt when patient's father is present.  - Consider low dose beta blocker pending above results    Medicine will continue to chart review for Echo results.    Kristel Barahona PA-C  Hospitalist Service  [phone]

## 2019-01-11 NOTE — PROGRESS NOTES
"    ----------------------------------------------------------------------------------------------------------  [facility], [place]   Psychiatric Progress Note  Hospital Day #51     Interim History:   The patient's care was discussed with the treatment team and chart notes were reviewed.    Sleep: Around 6.75 hrs  Scheduled Medications: Received.  Interval history:  Patient has been noted as having fluctuations. There are periods of day (with no clear diurnal variation), where she is doing better and others where she even looks disoriented. She walked up to a closet as if to shower. Then in shower sat down on a stool, dressed holding a towel. Has vacant stare noted by staff.  Is still minimally verbal. Does appear less sedated though after reduction of Lorazepam. She was in bed again this morning, saying that she was sleepy. She reported that her depression was better though. She responds with \"my dad\" often and on asking says that she wants to see him. She reports that she is hungry but wants to wait for food that father brings. On discussing ECHO, she agrees to have the test done today.   She did not have any adverse effects with any of her current medications, and no Lithium toxicity noted on increased dose of Lithium.  The risks, benefits, alternatives and side effects of any medication changes have been discussed and are understood by the patient and other caregivers.    Review of Systems:   See HPI for pertinent ROS.          Allergies:     Allergies   Allergen Reactions     Septra [Sulfamethoxazole W/Trimethoprim]             Psychiatric Examination:   /81 (BP Location: Right arm)   Pulse 116   Temp 99.5  F (37.5  C) (Oral)   Resp 16   Ht 1.702 m (5' 7\")   Wt 57.2 kg (126 lb)   SpO2 100%   BMI 19.73 kg/m    Weight is 126 lbs 0 oz  Body mass index is 19.73 kg/m .    Appearance: Standing up, adjusting her things in her room. Wearing hospital scrubs and has a weave " in.  Attitude:Co-operative.  Eye Contact: good  Mood: better  Affect:  More reactive, still restricted range  Speech: more verbal today  Psychomotor Behavior: No retardation of movement. No evidence of stereotypic movements, tardive dyskinesia, dystonia or tics. No tremor.   Thought Process: linear.  Associations: No loose associations.  Thought Content: no psychosis  Insight: Poor - improving  Judgment: Poor.  Attention Span and Concentration: better.  Recent and Remote Memory: Recent memory intact.  Language: Speaking fluent English with strong accent.  Fund of Knowledge: Difficult to assess.  Muscle Strength and Tone: Appearance is grossly normal, not obviously stiff. Mild EPS.  Gait and Station: normal appearing gait     Labs:     Lithium 1/6/19 - 0.63 mmol/l     Assessment    Diagnostic Impression: Ms. Davis is a 23 y.o. F with a psychiatric history of MDD with psychotic features who presented to the [facility] ED via EMS on 11/21/2018 due to psychosis in the setting of medication non-adherence. Her primary presentation is psychosis, as evidenced by reported and witnessed visual and auditory hallucinations including command hallucinations taking the form of her father, delusions that her father is trying to poison her, stereotypic hand movements, writhing leg movements, echopraxia, and delayed speech latency. Demonstrates emotional lability from calm baseline, with rapid transition to and from sobbing or dancing/singing/laughing. Sleep maintenance is poor and fluctuating. Given her prior diagnosis of MDD with psychosis and her current presentation with acute psychosis in the absence of depressive sxs, and disorganized thinking, a thought disorder seems to be the most likely pathology. Ddx includes schizoaffective disorder and less likely bipolar depression or MDD with psychotic features. Current symptoms, along with initial improvement with lorazepam suggest component of catatonia to her current unspecified  condition.     Hospital Course: Ms. Davis was admitted to [facility] on a 72 hour hold. Treatment team filed for commitment and [employer] which have been received. Olanzapine was started and uptitrated. PRN hydroxyzine and trazodone were initiated. There was some concern for catatonia given stereotypic movements and poor olanzapine response so she was given lorazepam with marked clinical improvement. Scheduled lorazepam was started. Not in full remission with olanzapine so attempted transition to aripiprazole which she has previously taken with good reported response, but she had recurrence of delusions, increased lability, new hyperactivity/agitation, and worsening sleep maintenance, so she is transitioning back to olanzapine. After demonstrating poor symptom improvement, clozapine was started and titrated while olanzapine was tapered off. She was tachycardic in the 120s without symptoms and was closely followed up , with rpt EKG WNL. With background mood lability, hyper-religiosity, periods of psychomotor agitation, Lithium was started on 12/31/18. With sub-therapeutic levels dose was increased to 900 mg/day. With concern of excess sedation and clinical assessment of ongoing mood lability and no clear other catatonia, night lorazepam was reduced and close monitoring planned.      Medical Course: CMP, lipid panel, TSH, B12/folate, CBC, sed rate, lyme, treponema, HIV, EMERALD, ceruloplasmin, ECG WNL. Vitamin D low. UA non-infectious. Demonstrated orthostasis, attributable to up-titration of olanzapine. Repeat CBC w/ diff WNL. Bowel regimen initiated for constipation. Ipratropium spray started for sialorrhea stopped as tachycardic. CMP WNL.    Plan   Principal Diagnosis:   # Unspecified Schizophrenia Spectrum and Other Psychotic Disorder dd -BPAD- Mixed affective with psychosis  # R/O Catatonia    Psychotropic Medications:  Modify:  - Increased Clozapine to 275 mg daily (12/26/18 )  - Lithium 1200 mg a day,  level on  12/11/19 -0.84 (900mg), next due on 1/16/19    Continue:  - Olanzapine stopped   - Haldol 5 mg Q4H PRN   - Diphenhydramine 50 mg Q4H PRN  - Lorazepam 1 mg TID  - Cogentin 1 mg BID PRN for akathisia/dystonia  - Hydroxyzine 25 mg Q4H PRN   - Ocean spray Q1H PRN for nasal congestion    Options considered if continued poor response - scheduling haloperidol, adding Lithium for suspected mood-catatonia (excited stupor), review depression presentation- serotonin specific reuptake inhibitor trial or  ECT.  At this time, with improvements noted, and noted significant sedation and sialorrhea, consider reduction in Lorazepam after Lithium levels and re-evaluate clozapine dose.  Patient will be treated in therapeutic milieu with appropriate individual and group therapies as described.  WBC counts reviewed and normal.    Medical diagnoses to be addressed this admission:    # Vitamin D Deficiency  - Continue ergocalciferol 50,000 Units Q7 days    # Constipation  - Miralax 17 g daily  - Docusate 250 mg daily  - Mylanta/Maalox QID PRN    # Headache/Neck Pain   - Ibuprofen 400 mg Q6H PRN  - Acetaminophen PRN  - Menthol Topical Analgesic Q6H PRN    # Sialorrhea  Secondary to clozapine.   Strategies like use of towel helping  # Nasal Congestion  - Ocean nasal spray PRN    # tachycardia  - concern for myocarditis  - HR almost double baseline 71 b/m  - cardiac enzymes normal, follow up any eosinophilia in rpt counts  - asymptomatic still - EKGs have been normal. Will follow up need for ECHO- literature review suggest cardiac MRI as gold standard for diagnostics (other than biopsy)  - IM consult appreciate thorough review and follow up  - Ipratropium stopped - not to restart  - May need to trend, note that did not send when symptomatic as no symptom elaborated (which may be hard due to mental status)  - Will follow up IM recs for Beta-blocker      Data: As above.  Consults: None.    Legal Status: Commitment with [employer].    Safety  Assessment:   Behavioral Orders   Procedures     Code 1 - Restrict to Unit     Elopement precautions     Fall precautions     Routine Programming     As clinically indicated     Single Room     Status 15     Every 15 minutes.     Suicide precautions     Patients on Suicide Precautions should have a Combination Diet ordered that includes a Diet selection(s) AND a Behavioral Tray selection for Safe Tray - with utensils, or Safe Tray - NO utensils         Disposition: TBD pending clinical stabilization and medication optimization.     Patient was seen and discussed with the attending .    Jessica Lim MD,  PGY1 Psychiatry resident      Attestation:  This patient has been seen and evaluated by me, Mauro Vann.  I have discussed this patient with the house staff team including the resident and medical student and I agree with the findings and plan in this note.    I have reviewed today's vital signs, medications, labs and imaging. Mauro Vann MD

## 2019-01-12 PROCEDURE — 12400001 ZZH R&B MH UMMC

## 2019-01-12 PROCEDURE — 25000132 ZZH RX MED GY IP 250 OP 250 PS 637: Performed by: STUDENT IN AN ORGANIZED HEALTH CARE EDUCATION/TRAINING PROGRAM

## 2019-01-12 PROCEDURE — 25000132 ZZH RX MED GY IP 250 OP 250 PS 637: Performed by: PSYCHIATRY & NEUROLOGY

## 2019-01-12 PROCEDURE — 25000132 ZZH RX MED GY IP 250 OP 250 PS 637: Performed by: PHYSICIAN ASSISTANT

## 2019-01-12 RX ADMIN — LORAZEPAM 1 MG: 1 TABLET ORAL at 09:11

## 2019-01-12 RX ADMIN — LITHIUM CARBONATE 1200 MG: 300 TABLET, EXTENDED RELEASE ORAL at 21:31

## 2019-01-12 RX ADMIN — LORAZEPAM 1 MG: 1 TABLET ORAL at 15:07

## 2019-01-12 RX ADMIN — METOPROLOL TARTRATE 6.25 MG: 25 TABLET, FILM COATED ORAL at 15:07

## 2019-01-12 RX ADMIN — METOPROLOL TARTRATE 6.25 MG: 25 TABLET, FILM COATED ORAL at 21:31

## 2019-01-12 RX ADMIN — CLOZAPINE 275 MG: 100 TABLET ORAL at 21:31

## 2019-01-12 RX ADMIN — DOCUSATE SODIUM 250 MG: 250 CAPSULE, LIQUID FILLED ORAL at 09:11

## 2019-01-12 RX ADMIN — LORAZEPAM 1 MG: 1 TABLET ORAL at 21:30

## 2019-01-12 RX ADMIN — POLYETHYLENE GLYCOL 3350 17 G: 17 POWDER, FOR SOLUTION ORAL at 09:12

## 2019-01-12 ASSESSMENT — ACTIVITIES OF DAILY LIVING (ADL)
LAUNDRY: WITH SUPERVISION
DRESS: SCRUBS (BEHAVIORAL HEALTH)
DRESS: INDEPENDENT
ORAL_HYGIENE: INDEPENDENT
HYGIENE/GROOMING: INDEPENDENT
ORAL_HYGIENE: INDEPENDENT
HYGIENE/GROOMING: INDEPENDENT

## 2019-01-12 NOTE — PROGRESS NOTES
Brief Medicine Note    Dannie Davis is a 23 year old female with a history of psychosis (schizophrenia vs BPAD vs other psychotic disorder), asthma, and GERD who was admitted to inpatient behavioral health on 11/21/18 with psychosis in the setting of medication non-adherence. Medicine following for persistent tachycardia.    Sinus Tachycardia - Intermittent since admission. Onset preceded Clozaril initiation on 12/10. -144 today. EKG x 2 (12/28 & 1/8) showed sinus tachycardia. CBC wnl on 1/7/19 without eosinophilia. Troponin negative and TSH wnl 1/8/19. Echo (1/11) with LVEF 60-65%, unable to assess diastolic function, normal RV size/function, no significant valvular dysfunction or pericardial effusion. Unable to assess for symptoms given psychiatric decompensation. Low clinical suspicion for myocarditis given afebrile with normal Troponin and eosinophil count. Suspect tachycardia is multifactorial r/t psychiatric decompensation and Clozaril use.  - Ensure adequate hydration   - Start low dose Metoprolol 6.25 mg BID. Up-titrate as needed.  - Defer ongoing Clozaril use to Psychiatry, though would encourage weighing risk vs benefits especially in the setting of tachycardia with minimal improvement in psychotic symptoms.    Medicine will continue to chart review HR and titrate Metoprolol as needed.    Kristel Barahona PA-C  Hospitalist Service  [phone]

## 2019-01-12 NOTE — PROGRESS NOTES
Pt was isolated to her room and withdrawn. Pt did not spend any time in the milieu or interact with peers. Pt brought her meals to eat in her room. Pt spent close to an hour in the shower at the beginning of the shift and requested to take another shower a few hours later. Pt was redirected to wait until a later time. Pt denies any psychotic symptoms as well as depression or anxiety. Pt appears blunted and has a very flat affect. Pt did not attend any groups this shift and appeared guarded during check in with staff.      01/12/19 1357   Behavioral Health   Hallucinations denies / not responding to hallucinations   Thinking poor concentration;distractable   Orientation person: oriented;place: oriented;time: oriented;date: oriented   Memory baseline memory   Insight poor   Judgement impaired   Eye Contact at examiner   Affect blunted, flat   Mood mood is calm   Physical Appearance/Attire attire appropriate to age and situation   Hygiene pre-occupied with grooming   1. Wish to be Dead No   2. Non-Specific Active Suicidal Thoughts  No   Enviromental Risk Factors None   Elopement Distress about legal situation (holds for mental health or criminal)   Activity isolative;withdrawn   Speech clear   Medication Sensitivity no observed side effects   Psychomotor / Gait steady;balanced   Overt Aggression Scale   Verbal Aggression 0   Aggression against Property 0   Auto-Aggression 0   Physical Aggression 0   Overt Aggression Total Score 0   Safety   Suicidality Status 15   Elopement signs posted on unit entrance / exit doors   Coping/Psychosocial   Verbalized Emotional State acceptance   Psycho Education   Type of Intervention 1:1 intervention   Response participates with encouragement   Hours 0.5   Treatment Detail (check in)   Group Therapy Session   Group Attendance refused to attend group session   Activity   Activity Assistance Provided independent   Activities of Daily Living   Hygiene/Grooming independent   Oral Hygiene  independent   Dress scrubs (behavioral health)   Room Organization independent   Bedside Attendant   Attendant Observation Quiet

## 2019-01-12 NOTE — PLAN OF CARE
pthas isolated in room much of this pm,showered but withdrawn and appears depressed,declined approach,she denies mh sx but appears to be guarded,difficult to assess,cardiac echo testing was completed this day and the results appear to be essentially wnl,pt demonstrates slow progress toward goals,defer to team

## 2019-01-13 PROCEDURE — 12400001 ZZH R&B MH UMMC

## 2019-01-13 PROCEDURE — 25000132 ZZH RX MED GY IP 250 OP 250 PS 637: Performed by: PHYSICIAN ASSISTANT

## 2019-01-13 PROCEDURE — 25000132 ZZH RX MED GY IP 250 OP 250 PS 637: Performed by: STUDENT IN AN ORGANIZED HEALTH CARE EDUCATION/TRAINING PROGRAM

## 2019-01-13 PROCEDURE — 25000132 ZZH RX MED GY IP 250 OP 250 PS 637: Performed by: PSYCHIATRY & NEUROLOGY

## 2019-01-13 RX ADMIN — POLYETHYLENE GLYCOL 3350 17 G: 17 POWDER, FOR SOLUTION ORAL at 09:43

## 2019-01-13 RX ADMIN — LORAZEPAM 1 MG: 1 TABLET ORAL at 14:18

## 2019-01-13 RX ADMIN — CLOZAPINE 275 MG: 100 TABLET ORAL at 19:38

## 2019-01-13 RX ADMIN — LORAZEPAM 1 MG: 1 TABLET ORAL at 19:39

## 2019-01-13 RX ADMIN — LORAZEPAM 1 MG: 1 TABLET ORAL at 09:44

## 2019-01-13 RX ADMIN — LITHIUM CARBONATE 1200 MG: 300 TABLET, EXTENDED RELEASE ORAL at 19:39

## 2019-01-13 RX ADMIN — METOPROLOL TARTRATE 6.25 MG: 25 TABLET, FILM COATED ORAL at 19:39

## 2019-01-13 RX ADMIN — METOPROLOL TARTRATE 6.25 MG: 25 TABLET, FILM COATED ORAL at 09:43

## 2019-01-13 RX ADMIN — DOCUSATE SODIUM 250 MG: 250 CAPSULE, LIQUID FILLED ORAL at 09:43

## 2019-01-13 ASSESSMENT — ACTIVITIES OF DAILY LIVING (ADL)
DRESS: INDEPENDENT
LAUNDRY: WITH SUPERVISION
ORAL_HYGIENE: INDEPENDENT
DRESS: INDEPENDENT
ORAL_HYGIENE: INDEPENDENT
HYGIENE/GROOMING: INDEPENDENT
HYGIENE/GROOMING: INDEPENDENT
LAUNDRY: WITH SUPERVISION

## 2019-01-13 NOTE — PROGRESS NOTES
Pt remained isolative and withdrawn to her room during the day. Minimally verbal communication when asked questions, soft spoken. Pt was calm, cooperative, denies SI/SIB, hallucinations, and medication side affects. Pt showered this morning and did not require prompting to exit the shower. Pt was visible in the milieu as the day progressed and changed her bedding linen independently.      01/13/19 1156   Behavioral Health   Hallucinations denies / not responding to hallucinations   Thinking distractable;poor concentration   Orientation person: oriented;place: oriented;date: oriented   Memory baseline memory   Insight poor   Judgement impaired   Eye Contact at examiner   Affect blunted, flat   Mood mood is calm   Physical Appearance/Attire attire appropriate to age and situation   Hygiene well groomed   Suicidality other (see comments)  (denies)   1. Wish to be Dead No   2. Non-Specific Active Suicidal Thoughts  No   Self Injury other (see comment)  (denies)   Activity isolative;withdrawn   Speech clear   Medication Sensitivity no observed side effects;no stated side effects   Psychomotor / Gait balanced;steady   Activities of Daily Living   Hygiene/Grooming independent   Oral Hygiene independent   Dress independent   Laundry with supervision   Room Organization independent   Activity   Activity Assistance Provided independent

## 2019-01-13 NOTE — PLAN OF CARE
Patient spending some time out of room and in milieu. Minimal interaction with staff and peers. Family did visit tonight and she was with them in the OT room eating dinner. Mood somewhat less tense. Affect remains flat. She requested to shower this evening. It is unclear as to if she actually showered. Found in the shower room sitting on the chair holding her things. Some areas of room were wet. She was unable to state if she did shower. She is med compliant. Denies SI and SIB. Betty Freeman RN

## 2019-01-14 LAB
BASOPHILS # BLD AUTO: 0 10E9/L (ref 0–0.2)
BASOPHILS NFR BLD AUTO: 0.3 %
DIFFERENTIAL METHOD BLD: ABNORMAL
EOSINOPHIL # BLD AUTO: 0.2 10E9/L (ref 0–0.7)
EOSINOPHIL NFR BLD AUTO: 2.7 %
ERYTHROCYTE [DISTWIDTH] IN BLOOD BY AUTOMATED COUNT: 13 % (ref 10–15)
HCT VFR BLD AUTO: 34.9 % (ref 35–47)
HGB BLD-MCNC: 11.8 G/DL (ref 11.7–15.7)
IMM GRANULOCYTES # BLD: 0 10E9/L (ref 0–0.4)
IMM GRANULOCYTES NFR BLD: 0.3 %
LYMPHOCYTES # BLD AUTO: 1.9 10E9/L (ref 0.8–5.3)
LYMPHOCYTES NFR BLD AUTO: 29.8 %
MCH RBC QN AUTO: 27.1 PG (ref 26.5–33)
MCHC RBC AUTO-ENTMCNC: 33.8 G/DL (ref 31.5–36.5)
MCV RBC AUTO: 80 FL (ref 78–100)
MONOCYTES # BLD AUTO: 0.4 10E9/L (ref 0–1.3)
MONOCYTES NFR BLD AUTO: 6.5 %
NEUTROPHILS # BLD AUTO: 3.8 10E9/L (ref 1.6–8.3)
NEUTROPHILS NFR BLD AUTO: 60.4 %
NRBC # BLD AUTO: 0 10*3/UL
NRBC BLD AUTO-RTO: 0 /100
PLATELET # BLD AUTO: 250 10E9/L (ref 150–450)
RBC # BLD AUTO: 4.36 10E12/L (ref 3.8–5.2)
WBC # BLD AUTO: 6.3 10E9/L (ref 4–11)

## 2019-01-14 PROCEDURE — 25000132 ZZH RX MED GY IP 250 OP 250 PS 637: Performed by: STUDENT IN AN ORGANIZED HEALTH CARE EDUCATION/TRAINING PROGRAM

## 2019-01-14 PROCEDURE — 99232 SBSQ HOSP IP/OBS MODERATE 35: CPT | Mod: GC | Performed by: PSYCHIATRY & NEUROLOGY

## 2019-01-14 PROCEDURE — 25000132 ZZH RX MED GY IP 250 OP 250 PS 637: Performed by: PSYCHIATRY & NEUROLOGY

## 2019-01-14 PROCEDURE — 36415 COLL VENOUS BLD VENIPUNCTURE: CPT | Performed by: STUDENT IN AN ORGANIZED HEALTH CARE EDUCATION/TRAINING PROGRAM

## 2019-01-14 PROCEDURE — G0177 OPPS/PHP; TRAIN & EDUC SERV: HCPCS

## 2019-01-14 PROCEDURE — 85025 COMPLETE CBC W/AUTO DIFF WBC: CPT | Performed by: STUDENT IN AN ORGANIZED HEALTH CARE EDUCATION/TRAINING PROGRAM

## 2019-01-14 PROCEDURE — 12400001 ZZH R&B MH UMMC

## 2019-01-14 PROCEDURE — 25000132 ZZH RX MED GY IP 250 OP 250 PS 637: Performed by: PHYSICIAN ASSISTANT

## 2019-01-14 RX ADMIN — CLOZAPINE 275 MG: 100 TABLET ORAL at 21:20

## 2019-01-14 RX ADMIN — LORAZEPAM 1 MG: 1 TABLET ORAL at 21:17

## 2019-01-14 RX ADMIN — Medication 12.5 MG: at 21:21

## 2019-01-14 RX ADMIN — LORAZEPAM 1 MG: 1 TABLET ORAL at 15:29

## 2019-01-14 RX ADMIN — ERGOCALCIFEROL 50000 UNITS: 1.25 CAPSULE ORAL at 18:40

## 2019-01-14 RX ADMIN — POLYETHYLENE GLYCOL 3350 17 G: 17 POWDER, FOR SOLUTION ORAL at 10:17

## 2019-01-14 RX ADMIN — LORAZEPAM 1 MG: 1 TABLET ORAL at 21:20

## 2019-01-14 RX ADMIN — LITHIUM CARBONATE 1200 MG: 300 TABLET, EXTENDED RELEASE ORAL at 21:21

## 2019-01-14 RX ADMIN — DOCUSATE SODIUM 250 MG: 250 CAPSULE, LIQUID FILLED ORAL at 10:16

## 2019-01-14 RX ADMIN — LORAZEPAM 1 MG: 1 TABLET ORAL at 10:16

## 2019-01-14 RX ADMIN — Medication 12.5 MG: at 10:17

## 2019-01-14 ASSESSMENT — ACTIVITIES OF DAILY LIVING (ADL)
HYGIENE/GROOMING: INDEPENDENT;SHOWER
ORAL_HYGIENE: INDEPENDENT
ORAL_HYGIENE: INDEPENDENT
LAUNDRY: WITH SUPERVISION
LAUNDRY: WITH SUPERVISION
DRESS: INDEPENDENT
DRESS: INDEPENDENT;SCRUBS (BEHAVIORAL HEALTH)
HYGIENE/GROOMING: INDEPENDENT;SHOWER

## 2019-01-14 NOTE — PROGRESS NOTES
"    ----------------------------------------------------------------------------------------------------------  [facility], [place]   Psychiatric Progress Note  Hospital Day #54     Interim History:   The patient's care was discussed with the treatment team and chart notes were reviewed.    Sleep: Around 6 hrs  Scheduled Medications: Received.  Interval history:  Patient has been reported as showing improvements. She is \"less tense\", interacts well with staff and family. She also independently changes her bed linen and does not need prompts to come out of the shower. Denies all symptoms to staff. On interview, with her parents present, she reports feeling better, but is still drowsy. She is also noted to be tremulous ( no other signs of Lithium toxicity noted). She denies mood or psychotic symptoms. She says that she wants to get out and does repeat the questions about when she could go home. Plan for potential discharge by early next week, lithium test on Wednesday and rationalization of medication/reducing medications prior to discharge were discussed with patient and family. Her parents agreed with the plan of care. She asked to speak with her daughter again (who is at school).  She did not have any adverse effects with any of her current medications.  The risks, benefits, alternatives and side effects of any medication changes have been discussed and are understood by the patient and other caregivers.    Review of Systems:   See HPI for pertinent ROS.          Allergies:     Allergies   Allergen Reactions     Septra [Sulfamethoxazole W/Trimethoprim]             Psychiatric Examination:   /76   Pulse 125   Temp 98.1  F (36.7  C) (Oral)   Resp 17   Ht 1.702 m (5' 7\")   Wt 57.2 kg (126 lb)   SpO2 100%   BMI 19.73 kg/m    Weight is 126 lbs 0 oz  Body mass index is 19.73 kg/m .    Appearance: Standing up, adjusting her things in her room. Wearing hospital scrubs and has a " weave in.  Attitude:Co-operative.  Eye Contact: good  Mood: better  Affect:  More reactive, still restricted range  Speech: more verbal today  Psychomotor Behavior: No retardation of movement. No evidence of stereotypic movements, tardive dyskinesia, dystonia or tics. Mild upper limb tremor.   Thought Process: linear.  Associations: No loose associations.  Thought Content: no psychosis  Insight: Poor - improving  Judgment: Poor.  Attention Span and Concentration: better.  Recent and Remote Memory: Recent memory intact.  Language: Speaking fluent English with strong accent.  Fund of Knowledge: Difficult to assess.  Muscle Strength and Tone: Appearance is grossly normal, not obviously stiff. Mild EPS.  Gait and Station: normal appearing gait     Labs:     Lithium 1/6/19 - 0.63 mmol/l     Assessment    Diagnostic Impression: Ms. Davis is a 23 y.o. F with a psychiatric history of MDD with psychotic features who presented to the [facility] ED via EMS on 11/21/2018 due to psychosis in the setting of medication non-adherence. Her primary presentation is psychosis, as evidenced by reported and witnessed visual and auditory hallucinations including command hallucinations taking the form of her father, delusions that her father is trying to poison her, stereotypic hand movements, writhing leg movements, echopraxia, and delayed speech latency. Demonstrates emotional lability from calm baseline, with rapid transition to and from sobbing or dancing/singing/laughing. Sleep maintenance is poor and fluctuating. Given her prior diagnosis of MDD with psychosis and her current presentation with acute psychosis in the absence of depressive sxs, and disorganized thinking, a thought disorder seems to be the most likely pathology. Ddx includes schizoaffective disorder and less likely bipolar depression or MDD with psychotic features. Current symptoms, along with initial improvement with lorazepam suggest component of catatonia to her current  unspecified condition.     Hospital Course: Ms. Davis was admitted to [facility] on a 72 hour hold. Treatment team filed for commitment and Guerrero which have been received. Olanzapine was started and uptitrated. PRN hydroxyzine and trazodone were initiated. There was some concern for catatonia given stereotypic movements and poor olanzapine response so she was given lorazepam with marked clinical improvement. Scheduled lorazepam was started. Not in full remission with olanzapine so attempted transition to aripiprazole which she has previously taken with good reported response, but she had recurrence of delusions, increased lability, new hyperactivity/agitation, and worsening sleep maintenance, so she is transitioning back to olanzapine. After demonstrating poor symptom improvement, clozapine was started and titrated while olanzapine was tapered off. She was tachycardic in the 120s without symptoms and was closely followed up , with rpt EKG WNL. With background mood lability, hyper-religiosity, periods of psychomotor agitation, Lithium was started on 12/31/18. With sub-therapeutic levels dose was increased to 900 mg/day. With concern of excess sedation and clinical assessment of ongoing mood lability and no clear other catatonia, night lorazepam was reduced and close monitoring planned.      Medical Course: CMP, lipid panel, TSH, B12/folate, CBC, sed rate, lyme, treponema, HIV, EMERALD, ceruloplasmin, ECG WNL. Vitamin D low. UA non-infectious. Demonstrated orthostasis, attributable to up-titration of olanzapine. Repeat CBC w/ diff WNL. Bowel regimen initiated for constipation. Ipratropium spray started for sialorrhea stopped as tachycardic. CMP WNL. She was evaluated for Tachycardia, had normal cardiac enzymes, no eosinophila, normal EKGs and ECHO and was started on Metoprolol 6.25 mg BID.      Plan   Principal Diagnosis:   # Unspecified Schizophrenia Spectrum and Other Psychotic Disorder dd -BPAD- Mixed affective with  psychosis  # R/O Catatonia    Psychotropic Medications:  Modify:  - Increased Clozapine to 275 mg daily (12/26/18 )  - Lithium 1200 mg a day,  level on 12/11/19 -0.84 (900mg), next due on 1/16/19    Continue:  - Olanzapine stopped   - Haldol 5 mg Q4H PRN   - Diphenhydramine 50 mg Q4H PRN  - Lorazepam 1 mg TID  - Cogentin 1 mg BID PRN for akathisia/dystonia  - Hydroxyzine 25 mg Q4H PRN   - Ocean spray Q1H PRN for nasal congestion    Options considered if continued poor response - scheduling haloperidol, adding Lithium for suspected mood-catatonia (excited stupor), review depression presentation- serotonin specific reuptake inhibitor trial or  ECT.  At this time, with improvements noted, and noted significant sedation and sialorrhea, consider reduction in Lorazepam after Lithium levels and re-evaluate clozapine dose.  Patient will be treated in therapeutic milieu with appropriate individual and group therapies as described.  WBC counts reviewed and normal.    Medical diagnoses to be addressed this admission:    # Vitamin D Deficiency  - Continue ergocalciferol 50,000 Units Q7 days    # Constipation  - Miralax 17 g daily  - Docusate 250 mg daily  - Mylanta/Maalox QID PRN    # Headache/Neck Pain   - Ibuprofen 400 mg Q6H PRN  - Acetaminophen PRN  - Menthol Topical Analgesic Q6H PRN    # Sialorrhea  Secondary to clozapine.   Strategies like use of towel helping  # Nasal Congestion  - Ocean nasal spray PRN    # tachycardia  - concern for myocarditis  - HR almost double baseline 71 b/m  - cardiac enzymes normal, follow up any eosinophilia in rpt counts  - asymptomatic still - EKGs have been normal. Will follow up need for ECHO- literature review suggest cardiac MRI as gold standard for diagnostics (other than biopsy)  - IM consult appreciate thorough review and follow up  - Ipratropium stopped - not to restart  - May need to trend, note that did not send when symptomatic as no symptom elaborated (which may be hard due to  mental status)  - Appreciate follow up and IM recs for Beta-blocker - started Metoprolol 6.25 mg BID          Data: As above.  Consults: None.    Legal Status: Commitment with [employer].    Safety Assessment:   Behavioral Orders   Procedures     Code 1 - Restrict to Unit     Elopement precautions     Fall precautions     Routine Programming     As clinically indicated     Status 15     Every 15 minutes.     Suicide precautions     Patients on Suicide Precautions should have a Combination Diet ordered that includes a Diet selection(s) AND a Behavioral Tray selection for Safe Tray - with utensils, or Safe Tray - NO utensils         Disposition: TBD pending clinical stabilization and medication optimization.     Patient was seen and discussed with my attending Dr Missy Kiran MD .    Jessica Lim MD,  PGY1 Psychiatry resident    Attestation:  This patient has been seen and evaluated by me, Missy Kiran.  I have discussed this patient with the psychiatry resident and I agree with the findings and plan in this note.    I have reviewed today's vital signs, medications, labs and imaging.   Missy Kiran MD.

## 2019-01-14 NOTE — PROGRESS NOTES
Brief Medicine Note  January 14, 2019    Dannie Davis is a 23 year old female with a history of psychosis (schizophrenia vs BPAD vs other psychotic disorder), asthma, and GERD who was admitted to inpatient behavioral health on 11/21/18 with psychosis in the setting of medication non-adherence. Medicine following for persistent tachycardia.     Sinus Tachycardia. Intermittent since admission. Onset preceded Clozaril initiation on 12/10. EKG x 2 (12/28 & 1/8) showed sinus tachycardia. CBC wnl on 1/7/19 without eosinophilia. Trop neg and TSH wnl. Echo (1/11) with LVEF 60-65%, unable to assess diastolic function, normal RV size/function, no significant valvular dysfunction or pericardial effusion. Unable to assess for symptoms given psychiatric decompensation. Low clinical suspicion for myocarditis given afebrile, normal Trop and eosinophil. Suspect tachycardia is multifactorial r/t psychiatric decompensation and Clozaril use. Started on metoprolol 6.25mg BID1/12. HRs persistently >110, upto 134 over past 24h.  - Ensure adequate hydration   - Increase metoprolol to 12.5mg BID today, monitor and uptitrate as needed  - Defer ongoing Clozaril use to Psychiatry, though would encourage weighing risk vs benefits especially in the setting of tachycardia with minimal improvement in psychotic symptoms.     Medicine will continue to chart review HR and titrate Metoprolol as needed.    Brenda Arriaga PA-C

## 2019-01-14 NOTE — PROGRESS NOTES
Pt visible in milieu, participating in groups, watching tv in the lounge, walking the freedman, listening to music.  Mood is calm, pleasant, smiling at times.  Rates day as 10/10.  Her goal for the day is to do her hair.  Is hoping to discharge in a week.  Denies all symptoms including hallucinations, SI/SIB, and medication side effects.         01/14/19 1400   Behavioral Health   Hallucinations denies / not responding to hallucinations   Thinking distractable   Orientation person: oriented;place: oriented   Memory baseline memory   Insight poor   Judgement impaired   Eye Contact at examiner;into space   Affect blunted, flat   Mood mood is calm   Physical Appearance/Attire attire appropriate to age and situation   Hygiene well groomed   Suicidality other (see comments)  (denies)   1. Wish to be Dead No   2. Non-Specific Active Suicidal Thoughts  No   Self Injury other (see comment)  (denies)   Activity isolative;withdrawn   Speech clear;coherent   Medication Sensitivity no stated side effects;no observed side effects   Psychomotor / Gait balanced;steady   Psycho Education   Type of Intervention 1:1 intervention   Response participates, initiates socially appropriate   Hours 0.5   Treatment Detail check in   Activities of Daily Living   Hygiene/Grooming independent;shower   Oral Hygiene independent   Dress independent;scrubs (behavioral health)   Laundry with supervision   Room Organization independent

## 2019-01-15 ENCOUNTER — TELEPHONE (OUTPATIENT)
Dept: BEHAVIORAL HEALTH | Facility: CLINIC | Age: 24
End: 2019-01-15

## 2019-01-15 PROCEDURE — 25000132 ZZH RX MED GY IP 250 OP 250 PS 637: Performed by: STUDENT IN AN ORGANIZED HEALTH CARE EDUCATION/TRAINING PROGRAM

## 2019-01-15 PROCEDURE — G0177 OPPS/PHP; TRAIN & EDUC SERV: HCPCS

## 2019-01-15 PROCEDURE — 12400001 ZZH R&B MH UMMC

## 2019-01-15 PROCEDURE — 99232 SBSQ HOSP IP/OBS MODERATE 35: CPT | Mod: GC | Performed by: PSYCHIATRY & NEUROLOGY

## 2019-01-15 PROCEDURE — 25000132 ZZH RX MED GY IP 250 OP 250 PS 637: Performed by: PHYSICIAN ASSISTANT

## 2019-01-15 RX ADMIN — POLYETHYLENE GLYCOL 3350 17 G: 17 POWDER, FOR SOLUTION ORAL at 10:06

## 2019-01-15 RX ADMIN — CLOZAPINE 250 MG: 100 TABLET ORAL at 21:52

## 2019-01-15 RX ADMIN — LORAZEPAM 1 MG: 1 TABLET ORAL at 10:05

## 2019-01-15 RX ADMIN — ACETAMINOPHEN 650 MG: 325 TABLET, FILM COATED ORAL at 12:28

## 2019-01-15 RX ADMIN — LITHIUM CARBONATE 1200 MG: 300 TABLET, EXTENDED RELEASE ORAL at 21:52

## 2019-01-15 RX ADMIN — LORAZEPAM 1 MG: 1 TABLET ORAL at 14:06

## 2019-01-15 RX ADMIN — LORAZEPAM 1 MG: 1 TABLET ORAL at 21:53

## 2019-01-15 RX ADMIN — Medication 12.5 MG: at 10:05

## 2019-01-15 RX ADMIN — Medication 12.5 MG: at 21:52

## 2019-01-15 RX ADMIN — DOCUSATE SODIUM 250 MG: 250 CAPSULE, LIQUID FILLED ORAL at 10:05

## 2019-01-15 ASSESSMENT — ACTIVITIES OF DAILY LIVING (ADL)
ORAL_HYGIENE: INDEPENDENT
HYGIENE/GROOMING: INDEPENDENT
DRESS: SCRUBS (BEHAVIORAL HEALTH)
LAUNDRY: WITH SUPERVISION
ORAL_HYGIENE: INDEPENDENT
HYGIENE/GROOMING: INDEPENDENT
LAUNDRY: WITH SUPERVISION
DRESS: SCRUBS (BEHAVIORAL HEALTH)

## 2019-01-15 NOTE — PROGRESS NOTES
Pt had an uneventful shift.  Continued to pace and listen to music.  Pt showered, ate dinner and snacks.  Appears to be improving.  Denies all symptoms including hallucinations, SI/SIB and medication side effects.       01/14/19 2031   Behavioral Health   Hallucinations denies / not responding to hallucinations   Thinking distractable   Orientation person: oriented;place: oriented   Memory baseline memory   Insight poor   Judgement impaired   Eye Contact at examiner   Affect blunted, flat   Mood mood is calm   Physical Appearance/Attire attire appropriate to age and situation   Hygiene well groomed   Suicidality other (see comments)  (denies)   1. Wish to be Dead No   2. Non-Specific Active Suicidal Thoughts  No   Self Injury other (see comment)  (denies)   Activity isolative;withdrawn   Speech clear;coherent   Medication Sensitivity no stated side effects;no observed side effects   Psychomotor / Gait balanced;steady   Psycho Education   Type of Intervention 1:1 intervention   Response participates, initiates socially appropriate   Hours 0.5   Treatment Detail check in   Activities of Daily Living   Hygiene/Grooming independent;shower   Oral Hygiene independent   Dress independent   Laundry with supervision   Room Organization independent

## 2019-01-15 NOTE — PROGRESS NOTES
"    ----------------------------------------------------------------------------------------------------------  [facility], [place]   Psychiatric Progress Note  Hospital Day #55     Interim History:   The patient's care was discussed with the treatment team and chart notes were reviewed.    Sleep: Around 6 hrs  Scheduled Medications: Received.  Interval history:  Patient has been reported as showing significant improvement. Rates herself as being 10 on 10. Has been more interactive and attends groups where she listens to music, takes part in OT and was also making a collage with peers. She has denied mood or psychotic symptoms and says she is looking forward to discharge.    On interview, she was pleasant and was more reactive. She asked about her discharge plans and was agreeable with day treatment options. Family was informed of referrals made for day treatment here. She said her depression was better and her memory was also getting better. She mentions that fufu is her favourite food.    Drooling appeared to be less, and sedation is les. No palpitations or chest pain reported though tachycardia persists.   She did not have any other adverse effects with any of her current medications.  The risks, benefits, alternatives and side effects of any medication changes have been discussed and are understood by the patient and other caregivers.    Review of Systems:   See HPI for pertinent ROS.          Allergies:     Allergies   Allergen Reactions     Septra [Sulfamethoxazole W/Trimethoprim]             Psychiatric Examination:   /77   Pulse 121   Temp 98.8  F (37.1  C) (Oral)   Resp 17   Ht 1.702 m (5' 7\")   Wt 57.2 kg (126 lb)   SpO2 97%   BMI 19.73 kg/m    Weight is 126 lbs 0 oz  Body mass index is 19.73 kg/m .    Appearance: In the milieu, more awake and alert. Wearing hospital scrubs and has a weave in.  Attitude:Co-operative.  Eye Contact: good  Mood: better  Affect:  More " reactive, improved range  Speech: more verbal today  Psychomotor Behavior: No retardation of movement. No evidence of stereotypic movements, tardive dyskinesia, dystonia or tics. Mild upper limb tremor.   Thought Process: linear.  Associations: No loose associations.  Thought Content: no psychosis  Insight: Poor - improving  Judgment: Poor.  Attention Span and Concentration: better.  Recent and Remote Memory: Recent memory intact.  Language: Speaking fluent English with strong accent.  Fund of Knowledge: Difficult to assess.  Muscle Strength and Tone: Appearance is grossly normal, not obviously stiff. Mild EPS.  Gait and Station: normal appearing gait     Labs:     Lithium 1/6/19 - 0.63 mmol/l     Assessment    Diagnostic Impression: Ms. Davis is a 23 y.o. F with a psychiatric history of MDD with psychotic features who presented to the [facility] ED via EMS on 11/21/2018 due to psychosis in the setting of medication non-adherence. Her primary presentation is psychosis, as evidenced by reported and witnessed visual and auditory hallucinations including command hallucinations taking the form of her father, delusions that her father is trying to poison her, stereotypic hand movements, writhing leg movements, echopraxia, and delayed speech latency. Demonstrates emotional lability from calm baseline, with rapid transition to and from sobbing or dancing/singing/laughing. Sleep maintenance is poor and fluctuating. Given her prior diagnosis of MDD with psychosis and her current presentation with acute psychosis in the absence of depressive sxs, and disorganized thinking, a thought disorder seems to be the most likely pathology. Ddx includes schizoaffective disorder and less likely bipolar depression or MDD with psychotic features. Current symptoms, along with initial improvement with lorazepam suggest component of catatonia to her current unspecified condition.     Hospital Course: Ms. Davis was admitted to [facility] on a 72  hour hold. Treatment team filed for commitment and Guerrero which have been received. Olanzapine was started and uptitrated. PRN hydroxyzine and trazodone were initiated. There was some concern for catatonia given stereotypic movements and poor olanzapine response so she was given lorazepam with marked clinical improvement. Scheduled lorazepam was started. Not in full remission with olanzapine so attempted transition to aripiprazole which she has previously taken with good reported response, but she had recurrence of delusions, increased lability, new hyperactivity/agitation, and worsening sleep maintenance, so she is transitioning back to olanzapine. After demonstrating poor symptom improvement, clozapine was started and titrated while olanzapine was tapered off. She was tachycardic in the 120s without symptoms and was closely followed up , with rpt EKG WNL. With background mood lability, hyper-religiosity, periods of psychomotor agitation, Lithium was started on 12/31/18. With sub-therapeutic levels dose was increased to 1200 mg/day. With concern of excess sedation and clinical assessment of ongoing mood lability and no clear other catatonia, night lorazepam was reduced and close monitoring planned. With persisting tachycardia even on Metoprolol, Clozapine dose was reduced.     Medical Course: CMP, lipid panel, TSH, B12/folate, CBC, sed rate, lyme, treponema, HIV, EMERALD, ceruloplasmin, ECG WNL. Vitamin D low. UA non-infectious. Demonstrated orthostasis, attributable to up-titration of olanzapine. Repeat CBC w/ diff WNL. Bowel regimen initiated for constipation. Ipratropium spray started for sialorrhea stopped as tachycardic. CMP WNL. She was evaluated for Tachycardia, had normal cardiac enzymes, no eosinophila, normal EKGs and ECHO and was started on Metoprolol 6.25 mg BID and increased to 12.5mg BID as she continued to be tachycardic. Medicine also suggested review of Clozapine in the presence of this now chronic  tachycardia.       Plan   Principal Diagnosis:   # Unspecified Schizophrenia Spectrum and Other Psychotic Disorder dd -BPAD- Mixed affective with psychosis  # R/O Catatonia    Psychotropic Medications:  Modify:  - Decreased Clozapine to 250 mg daily (1/15/18 )  - Lithium 1200 mg a day,  level on 12/11/19 -0.84 (900mg), next due on 1/16/19    Continue:  - Haldol 5 mg PO Q4H PRN   - Lorazepam 1 mg TID  - Hydroxyzine 25 mg Q4H PRN   - Ocean spray Q1H PRN for nasal congestion    Options considered if continued poor response - scheduling haloperidol, adding Lithium for suspected mood-catatonia (excited stupor), review depression presentation- serotonin specific reuptake inhibitor trial or  ECT.  At this time, with improvements noted, and noted significant sedation and sialorrhea, consider reduction in Lorazepam after Lithium levels and re-evaluate clozapine dose.  Patient will be treated in therapeutic milieu with appropriate individual and group therapies as described.  WBC counts reviewed and normal.    Medical diagnoses to be addressed this admission:    # Vitamin D Deficiency  - Continue ergocalciferol 50,000 Units Q7 days    # Constipation  - Miralax 17 g daily  - Docusate 250 mg daily  - Mylanta/Maalox QID PRN    # Headache/Neck Pain   - Ibuprofen 400 mg Q6H PRN  - Acetaminophen PRN  - Menthol Topical Analgesic Q6H PRN    # Sialorrhea  Secondary to clozapine.   Strategies like use of towel helping  # Nasal Congestion  - Ocean nasal spray PRN    # tachycardia  - concern for myocarditis  - HR almost double baseline 71 b/m  - cardiac enzymes normal, follow up any eosinophilia in rpt counts  - asymptomatic still - EKGs have been normal. Will follow up need for ECHO- literature review suggest cardiac MRI as gold standard for diagnostics (other than biopsy)  - IM consult appreciate thorough review and follow up  - Ipratropium stopped - not to restart  - May need to trend, note that did not send when symptomatic as no  symptom elaborated (which may be hard due to mental status)  - Appreciate follow up and IM recs for Beta-blocker - increased Metoprolol 12.5 mg BID          Data: As above.  Consults: None.    Legal Status: Commitment with [employer].    Safety Assessment:   Behavioral Orders   Procedures     Code 1 - Restrict to Unit     Elopement precautions     Fall precautions     Routine Programming     As clinically indicated     Status 15     Every 15 minutes.     Suicide precautions     Patients on Suicide Precautions should have a Combination Diet ordered that includes a Diet selection(s) AND a Behavioral Tray selection for Safe Tray - with utensils, or Safe Tray - NO utensils         Disposition: TBD pending clinical stabilization and medication optimization.     Patient was seen and discussed with my attending Dr Missy Kiran MD .    Jessica Lim MD,  PGY1 Psychiatry resident    Attestation:  This patient has been seen and evaluated by me, Missy Kiran.  I have discussed this patient with the psychiatry resident and I agree with the findings and plan in this note.    I have reviewed today's vital signs, medications, labs and imaging.   Missy Kiran MD.

## 2019-01-15 NOTE — PLAN OF CARE
Dannie attended 1 of 3 OT groups today. Quiet though occasionally laughed to herself at odd times. Worked on a simple structured creative project. No interaction with others observed while she was in session. No major changes noted. Affect still glazed, vacant.

## 2019-01-15 NOTE — DISCHARGE SUMMARY
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 She was medically cleared for admission to inpatient psychiatric unit.     Per ED report:  Per Dannie's father, she was supposed to attend an outpatient appointment today for \"an itchy vagina.\" She was getting ready to leave but instead left the house and began walking down the street to get fast food. He tried to get her to come home but she refused, walking down the middle of the street. She reportedly attempted to jump out of a car en route to the appointment. Her family has been concerned about her safety as she has not been tracking well, has not been sleeping for at least a week, and has had a decreased appetite. Dannie has been non-adherent to medications for at least 6 months. She was seen at [facility] yesterday by psychiatry to restart medications, but Dannie has yet to restart these. Stressors include leaving her 3-year-old daughter in [place] when she immigrated to the [place] in 1/2017 and having her mother return to [facility] to be with patient's daughter. 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 In the ED, she was noted to be a poor historian. She reported feeling overwhelmed but denied SI or HI. She denies trying to jump out of a moving car. She was responding to internal stimuli, slow to respond to questions, " "repeatedly sticking out her tongue to touch her bottom lip, and seen chanting and singing to herself. She became agitated in the ED and was given Olanzapine 10mg once in the ED at 1415. UDS and HCG urine negative.      Per patient report:    When asked why she is in the hospital, Dannie describes that all of the food in her house tastes bitter, so she decided to go get fast food today. Her father then told her to stop walking to get food and that she was sick. Dannie states that she is \"scared\" of her father but denies that he has harmed her. She states that \"when I look at my father and brother, I know they are trying to hurt me. They are controlling me.\" She is frightened by this and is unsure if other people are trying to harm her. She describes her father controlling her as \"when he was here, he made me very serious.\" She notes that she is less serious now that he is gone. Dannie endorses auditory hallucinations that began about a week ago, about the same time that she noticed a scratch below her lower lip. When asked what the voices say, she responds that the voices \"make my ear itch and make me feel funny.\" The voices also \"make my heart beat strong.\" She denies that the voices have been commanding her to do anything and specifically denies that the voices command her to harm herself or others. She describes that the voices do control her and make her \"sit, stand, and eat food.\" Dannie denies visual hallucinations or ideas of references. She endorses both thought insertions and deletions that \"make my heart beat strong.\" She describes her mood as \"fine\" and notes that she has had difficulty sleeping for the past week, since she noticed the scratch below her lip. She states that she has been on Abilify and Olanzapine before and agrees to start Olanzapine tonight.      Per chart review: Per records, Dannie was hospitalized at [facility] from 4/29-5/31/17 with the diagnosis of MDD with psychotic " features. Those records indicate that she did not have any psychiatric symptoms before [place] the [place] and being  from her daughter in 1/2017. During that admission, she had a negative CT head and normal EKG.      The risks, benefits, alternatives and side effects have been discussed and are understood by the patient and other caregivers.      Hospital Course     Diagnostic Impression: Ms. Davis is a 23 y.o. F with a psychiatric history of MDD with psychotic features who presented to the [facility] ED via EMS on 11/21/2018 due to psychosis in the setting of medication non-adherence. Her primary presentation is psychosis, as evidenced by reported and witnessed visual and auditory hallucinations including command hallucinations taking the form of her father, delusions that her father is trying to poison her, stereotypic hand movements, writhing leg movements, echopraxia, and delayed speech latency. Demonstrates emotional lability from calm baseline, with rapid transition to and from sobbing or dancing/singing/laughing. Sleep maintenance is poor and fluctuating. Given her prior diagnosis of MDD with psychosis and her current presentation with acute psychosis in the absence of depressive sxs, and disorganized thinking, a thought disorder seems to be the most likely pathology. Ddx includes schizoaffective disorder and less likely bipolar depression or MDD with psychotic features. Current symptoms, along with initial improvement with lorazepam suggest component of catatonia to her current unspecified condition.      Hospital Course: Ms. Davis was admitted to [facility] on a 72 hour hold. Treatment team filed for commitment and Guerrero which have been received. Olanzapine was started and uptitrated. PRN hydroxyzine and trazodone were initiated. There was some concern for catatonia given stereotypic movements and poor olanzapine response so she was given lorazepam with marked clinical improvement. Scheduled lorazepam  was started. Not in full remission with olanzapine so attempted transition to aripiprazole which she has previously taken with good reported response, but she had recurrence of delusions, increased lability, new hyperactivity/agitation, and worsening sleep maintenance, so she is transitioning back to olanzapine. After demonstrating poor symptom improvement, clozapine was started and titrated while olanzapine was tapered off. She was tachycardic in the 120s without symptoms and was closely followed up , with rpt EKG WNL. With background mood lability, hyper-religiosity, periods of psychomotor agitation, Lithium was started on 12/31/18. With sub-therapeutic levels dose was increased to 1200 mg/day. With concern of excess sedation and clinical assessment of ongoing mood lability and no clear other catatonia, night lorazepam was reduced and close monitoring planned. With persisting tachycardia even on Metoprolol, Clozapine dose was reduced. Further reductions were planned as outpatient after discussing with Outpatient provider.     Medical Course: CMP, lipid panel, TSH, B12/folate, CBC, sed rate, lyme, treponema, HIV, EMERALD, ceruloplasmin, ECG WNL. Vitamin D low. UA non-infectious. Demonstrated orthostasis, attributable to up-titration of olanzapine. Repeat CBC w/ diff WNL. Bowel regimen initiated for constipation. Ipratropium spray started for sialorrhea stopped as tachycardic. CMP WNL. She was evaluated for Tachycardia, had normal cardiac enzymes, no eosinophila, normal EKGs and ECHO and was started on Metoprolol 6.25 mg BID and increased to 12.5mg BID as she continued to be tachycardic. Medicine also suggested review of Clozapine in the presence of this now chronic tachycardia. Clozapine dose was reduced to 250 mg / day before admission.     Patient was seen and discussed with my attending MD REBECCA Dick MD  PGY1, Psychiatry    Attestation:   The patient has been seen and evaluated by me,   "Mauro Vann. I have examined the patient today and reviewed the discharge plan with the resident and medical student. I agree with the final assessment and plan, as noted in the discharge summary. I have reviewed today's vital signs, medications, labs and imaging.  Total time discharge plannin minutes  Mauro Vann MD    Code Status   Full Code    Primary Psychiatry provider:  Guillermo Li    Physical Exam   Vital signs:  Temp: 97.6  F (36.4  C) Temp src: Oral BP: 122/79 Pulse: 86   Resp: 12 SpO2: 98 % O2 Device: None (Room air)   Height: 170.2 cm (5' 7\") Weight: 59.4 kg (131 lb)  Estimated body mass index is 20.52 kg/m  as calculated from the following:    Height as of this encounter: 1.702 m (5' 7\").    Weight as of this encounter: 59.4 kg (131 lb).      Appearance: In the milieu, more awake and alert.   Attitude:Co-operative.  Eye Contact: good  Mood: better  Affect:  More reactive, some restricted range  Speech: More verbal   Psychomotor Behavior: No retardation of movement. No evidence of stereotypic movements, tardive dyskinesia, dystonia or tics. Mild upper limb tremor.   Thought Process: linear.  Associations: No loose associations.  Thought Content: no psychosis  Insight: Poor - improving  Judgment: Poor.  Attention Span and Concentration: better.  Recent and Remote Memory: Recent memory intact.  Language: Speaking fluent English with strong accent.  Fund of Knowledge: Difficult to assess.  Muscle Strength and Tone: Appearance is grossly normal, not obviously stiff. Mild EPS.  Gait and Station: normal appearing gait       Time Spent on this Encounter   IREBECCA, personally saw the patient today and spent less than or equal to 30 minutes discharging this patient.    Discharge Disposition   Discharged to home  Condition at discharge: Stable  At the time of discharge no psychotic or catatonic symptoms. Reports improving depression. Mood lability had improved as well. Still sedated and slow to " respond while on higher doses of clozapine and lorazepam, which are to be gradually tapered off.     Consultations This Hospital Stay   MEDICATION HISTORY IP PHARMACY CONSULT  INTERNAL MEDICINE ADULT IP CONSULT FOR BEH GENERAL IN [facility]    Discharge Orders        Thursday January 24, 2019 - 9:00am   Day Treatment Intake Assessment  [facility] Day Treatment Located in [facility] Ground Floor [id number] ; [address] [place] [address] Phone:[phone]        January 31, 2019 - 8:30am Psychiatry Provider - Guillermo Li DNP, LESLYP  [address], [address]. Phone: [phone] Fax: [phone]    Repeat lithium level as outpatient as on higher dose/levels, after review with outpatient psychiatrist in the next 2-4 weeks    Discharge Medications     Clozapine to 250 mg daily (1/15/18 )  Lithium 1200 mg a day  Ergocalciferol 50,000 Units Q7 days  Miralax 17 g daily  Docusate 250 mg daily  Metoprolol 12.5mg BID    To taper off Lorazepam slowly after Outpatient psychiatry review. Review dose of Clozapine as outpatient depending on side-effects.    Allergies   Allergies   Allergen Reactions     Septra [Sulfamethoxazole W/Trimethoprim]      Data   Most Recent 3 CBC's:  Recent Labs   Lab Test 01/14/19  0823 01/07/19  0854 01/02/19  0800   WBC 6.3 5.0 5.3   HGB 11.8 12.0 12.7   MCV 80 80 79    207 212      Most Recent 3 BMP's:  Recent Labs   Lab Test 12/18/18  1309 11/22/18  0752    142   POTASSIUM 3.8 3.6   CHLORIDE 104 107   CO2 28 27   BUN 13 8   CR 0.73 0.67   ANIONGAP 6 8   AUBREY 9.2 8.7   * 94     Most Recent 2 LFT's:  Recent Labs   Lab Test 12/18/18  1309 11/22/18  0752   AST 16 13   ALT 22 18   ALKPHOS 78 49   BILITOTAL 0.2 0.4     Most Recent INR's and Anticoagulation Dosing History:  Anticoagulation Dose History     There is no flowsheet data to display.        Most Recent 3  Troponin's:  Recent Labs   Lab Test 01/08/19  1037   TROPI <0.015     Most Recent Cholesterol Panel:  Recent Labs   Lab Test 11/22/18  0752   CHOL 148   LDL 79   HDL 56   TRIG 66     Most Recent 6 Bacteria Isolates From Any Culture (See EPIC Reports for Culture Details):No lab results found.  Most Recent TSH, T4 and A1c Labs:  Recent Labs   Lab Test 01/08/19  1037   TSH 0.92     No results found for this or any previous visit.

## 2019-01-15 NOTE — PROGRESS NOTES
01/15/19 1400   Behavioral Health   Hallucinations denies / not responding to hallucinations   Thinking distractable;poor concentration   Orientation person: oriented;place: oriented;date: oriented;time: oriented   Memory baseline memory   Insight poor   Judgement impaired   Eye Contact at examiner   Affect full range affect   Mood irritable   Physical Appearance/Attire attire appropriate to age and situation   Suicidality other (see comments)  (denies)   1. Wish to be Dead No   2. Non-Specific Active Suicidal Thoughts  No   Self Injury other (see comment)  (none observed)   Elopement (none observed)   Activity other (see comment)  (present in milieu)   Speech clear;coherent   Medication Sensitivity no stated side effects   Psychomotor / Gait balanced;steady   Safety   Suicidality Status 15   Activities of Daily Living   Hygiene/Grooming independent   Oral Hygiene independent   Dress scrubs (behavioral health)   Laundry with supervision   Room Organization independent     Pt was present in the milieu, but did not participate in therapeutic groups. Pt slept in for most of the morning and missed breakfast. Pt had parents visit, and ate a lunch brought in by her visitors. Pt denies SI/HI/SIB/AH/VH or racing thoughts.

## 2019-01-16 LAB — LITHIUM SERPL-SCNC: 1.12 MMOL/L (ref 0.6–1.2)

## 2019-01-16 PROCEDURE — 12400001 ZZH R&B MH UMMC

## 2019-01-16 PROCEDURE — 99232 SBSQ HOSP IP/OBS MODERATE 35: CPT | Mod: GC | Performed by: PSYCHIATRY & NEUROLOGY

## 2019-01-16 PROCEDURE — 80178 ASSAY OF LITHIUM: CPT | Performed by: STUDENT IN AN ORGANIZED HEALTH CARE EDUCATION/TRAINING PROGRAM

## 2019-01-16 PROCEDURE — 25000132 ZZH RX MED GY IP 250 OP 250 PS 637: Performed by: STUDENT IN AN ORGANIZED HEALTH CARE EDUCATION/TRAINING PROGRAM

## 2019-01-16 PROCEDURE — 25000132 ZZH RX MED GY IP 250 OP 250 PS 637: Performed by: PHYSICIAN ASSISTANT

## 2019-01-16 PROCEDURE — G0177 OPPS/PHP; TRAIN & EDUC SERV: HCPCS

## 2019-01-16 RX ADMIN — CLOZAPINE 250 MG: 100 TABLET ORAL at 21:00

## 2019-01-16 RX ADMIN — DOCUSATE SODIUM 250 MG: 250 CAPSULE, LIQUID FILLED ORAL at 09:29

## 2019-01-16 RX ADMIN — LITHIUM CARBONATE 1200 MG: 300 TABLET, EXTENDED RELEASE ORAL at 20:59

## 2019-01-16 RX ADMIN — Medication 12.5 MG: at 21:00

## 2019-01-16 RX ADMIN — LORAZEPAM 1 MG: 1 TABLET ORAL at 09:28

## 2019-01-16 RX ADMIN — ACETAMINOPHEN 650 MG: 325 TABLET, FILM COATED ORAL at 11:11

## 2019-01-16 RX ADMIN — LORAZEPAM 1 MG: 1 TABLET ORAL at 13:16

## 2019-01-16 RX ADMIN — Medication 12.5 MG: at 09:28

## 2019-01-16 RX ADMIN — LORAZEPAM 1 MG: 1 TABLET ORAL at 21:00

## 2019-01-16 ASSESSMENT — ACTIVITIES OF DAILY LIVING (ADL)
HYGIENE/GROOMING: INDEPENDENT
ORAL_HYGIENE: INDEPENDENT
HYGIENE/GROOMING: INDEPENDENT
ORAL_HYGIENE: INDEPENDENT
LAUNDRY: WITH SUPERVISION
DRESS: STREET CLOTHES;INDEPENDENT
DRESS: INDEPENDENT

## 2019-01-16 NOTE — PLAN OF CARE
Patient well groomed. Spends some time in milieu. Affect is flat. Mood is calm. Minimal interaction with staff or peers. She is compliant with medications and also with lab draw today. She denies SI and SIB. Betty Freeman RN

## 2019-01-16 NOTE — PROGRESS NOTES
Pt was observed in the milieu for much of the evening to watch TV briefly, eat snacks and meals, and walk the halls. Pt presented with a blunt affect for much of the evening only providing few word responses when prompted. Later in the evening pt was observed beginning to jog down the freedman. When prompted to stop jogging and walk pt began laughing. Pt denies SI and SIB.      01/15/19 1936   Behavioral Health   Hallucinations denies / not responding to hallucinations   Thinking distractable   Orientation person: oriented;place: oriented   Memory baseline memory   Insight poor   Judgement impaired   Eye Contact at examiner   Affect blunted, flat   Mood mood is calm   Physical Appearance/Attire attire appropriate to age and situation   Hygiene well groomed   Suicidality (Pt denies)   1. Wish to be Dead No   2. Non-Specific Active Suicidal Thoughts  No   Activity (Present in milieu)   Speech clear;coherent   Medication Sensitivity no stated side effects   Psychomotor / Gait balanced;steady   Activities of Daily Living   Hygiene/Grooming independent   Oral Hygiene independent   Dress scrubs (behavioral health)   Laundry with supervision   Room Organization independent

## 2019-01-16 NOTE — PROGRESS NOTES
----------------------------------------------------------------------------------------------------------  [facility], [place]   Psychiatric Progress Note  Hospital Day #56     Interim History:   The patient's care was discussed with the treatment team and chart notes were reviewed.    Sleep: Around 7 hrs  Scheduled Medications: Received.  Interval history:  Patient has been more present in the milieu, watching TV and eating snacks. She also attends OT though is occupied in her own activity with improving participation. She is noted to mostly be flat in affect and at times smiling inappropriately or once jogging down the hallway. Today she appeared more appropriate, but did want to talk to interviewer about fixing her hair (she has expressed this concern for sometime).  She does not endorse depressive symptoms anymore, and agrees with the plan for day treatment. No other mood or psychosis symptoms were reported. There was some increase in her overall physical activity, but does not appear excessive or suggestive of zeb at this time. When asked about room-mate and conflict yesterday, she does express frustration with being here and some peers, but did not appear to be continuing or excessive.   No palpitations or chest pain reported.   She did not have any other adverse effects with any of her current medications.  The risks, benefits, alternatives and side effects of any medication changes have been discussed and are understood by the patient and other caregivers.  Discussion with Guillermo Li, primary psychiatry provider today, updated on current condition and plan of care including medication adjustments - following up Clozapine and Lorazepam reductions as outpatient gradually and discharge with day treatment plan, which primary provider agreed with. Early appointment post discharge was also planned.    Review of Systems:   See HPI for pertinent ROS.          Allergies:  "    Allergies   Allergen Reactions     Septra [Sulfamethoxazole W/Trimethoprim]             Psychiatric Examination:   /68   Pulse 116   Temp 98.2  F (36.8  C)   Resp 14   Ht 1.702 m (5' 7\")   Wt 57.2 kg (126 lb)   SpO2 100%   BMI 19.73 kg/m    Weight is 126 lbs 0 oz  Body mass index is 19.73 kg/m .    Appearance: In the milieu, more awake and alert. Wearing hospital scrubs and has a weave in.  Attitude:Co-operative.  Eye Contact: good  Mood: better  Affect:  More reactive, improved range  Speech: more verbal today  Psychomotor Behavior: No retardation of movement. No evidence of stereotypic movements, tardive dyskinesia, dystonia or tics. Mild upper limb tremor.   Thought Process: linear.  Associations: No loose associations.  Thought Content: no psychosis  Insight: Poor - improving  Judgment: Poor.  Attention Span and Concentration: better.  Recent and Remote Memory: Recent memory intact.  Language: Speaking fluent English with strong accent.  Fund of Knowledge: Difficult to assess.  Muscle Strength and Tone: Appearance is grossly normal, not obviously stiff. Mild EPS.  Gait and Station: normal appearing gait     Labs:     Lithium 1/16/19 - 1.12 mmol/l     Assessment    Diagnostic Impression: Ms. Davis is a 23 y.o. F with a psychiatric history of MDD with psychotic features who presented to the [facility] ED via EMS on 11/21/2018 due to psychosis in the setting of medication non-adherence. Her primary presentation is psychosis, as evidenced by reported and witnessed visual and auditory hallucinations including command hallucinations taking the form of her father, delusions that her father is trying to poison her, stereotypic hand movements, writhing leg movements, echopraxia, and delayed speech latency. Demonstrates emotional lability from calm baseline, with rapid transition to and from sobbing or dancing/singing/laughing. Sleep maintenance is poor and fluctuating. Given her prior diagnosis of MDD with " psychosis and her current presentation with acute psychosis in the absence of depressive sxs, and disorganized thinking, a thought disorder seems to be the most likely pathology. Ddx includes schizoaffective disorder and less likely bipolar depression or MDD with psychotic features. Current symptoms, along with initial improvement with lorazepam suggest component of catatonia to her current unspecified condition.     Hospital Course: Ms. Davis was [facility] to [facility] on a 72 hour hold. Treatment team filed for commitment and [facility] which have been received. Olanzapine was started and uptitrated. PRN hydroxyzine and trazodone were initiated. There was some concern for catatonia given stereotypic movements and poor olanzapine response so she was given lorazepam with marked clinical improvement. Scheduled lorazepam was started. Not in full remission with olanzapine so attempted transition to aripiprazole which she has previously taken with good reported response, but she had recurrence of delusions, increased lability, new hyperactivity/agitation, and worsening sleep maintenance, so she is transitioning back to olanzapine. After demonstrating poor symptom improvement, clozapine was started and titrated while olanzapine was tapered off. She was tachycardic in the 120s without symptoms and was closely followed up , with rpt EKG WNL. With background mood lability, hyper-religiosity, periods of psychomotor agitation, Lithium was started on 12/31/18. With sub-therapeutic levels dose was increased to 1200 mg/day (1.12 mmol/L). With concern of excess sedation and clinical assessment of ongoing mood lability and no clear other catatonia, night lorazepam was reduced and close monitoring planned. With persisting tachycardia even on Metoprolol, Clozapine dose was reduced. Team contacted Guillermo Li, Primary psychiatry provider, [facility] and discussed continued plan of care including gradual reduction in Lorazepam, review  Clozapine dose - reduction/change as Outpatient, and plan for day treatment which they agreed with. Early outpatient appointment was also scheduled.     Medical Course: CMP, lipid panel, TSH, B12/folate, CBC, sed rate, lyme, treponema, HIV, EMERALD, ceruloplasmin, ECG WNL. Vitamin D low. UA non-infectious. Demonstrated orthostasis, attributable to up-titration of olanzapine. Repeat CBC w/ diff WNL. Bowel regimen initiated for constipation. Ipratropium spray started for sialorrhea stopped as tachycardic. CMP WNL. She was evaluated for Tachycardia, had normal cardiac enzymes, no eosinophila, normal EKGs and ECHO and was started on Metoprolol 6.25 mg BID and increased to 12.5mg BID as she continued to be tachycardic. Medicine also suggested review of Clozapine in the presence of this now chronic tachycardia.       Plan   Principal Diagnosis:   # Unspecified Schizophrenia Spectrum and Other Psychotic Disorder dd -BPAD- Mixed affective with psychosis  # R/O Catatonia    Psychotropic Medications:  Modify:  - Decreased Clozapine to 250 mg daily (1/15/18 )  - Lithium 1200 mg a day (1.12 mmol/L) on 1/16/19    Continue:  - Haldol 5 mg PO Q4H PRN   - Lorazepam 1 mg TID   - Hydroxyzine 25 mg Q4H PRN   - Ocean spray Q1H PRN for nasal congestion    Options considered if continued poor response - scheduling haloperidol, adding Lithium for suspected mood-catatonia (excited stupor), review depression presentation- serotonin specific reuptake inhibitor trial or  ECT.  At this time, with improvements noted, and noted significant sedation and sialorrhea, consider reduction in Lorazepam after Lithium levels and re-evaluate clozapine dose.  Patient will be treated in therapeutic milieu with appropriate individual and group therapies as described.  WBC counts reviewed and normal.    Medical diagnoses to be addressed this admission:    # Vitamin D Deficiency  - Continue ergocalciferol 50,000 Units Q7 days    # Constipation  - Miralax 17 g  daily  - Docusate 250 mg daily  - Mylanta/Maalox QID PRN    # Headache/Neck Pain   - Ibuprofen 400 mg Q6H PRN  - Acetaminophen PRN  - Menthol Topical Analgesic Q6H PRN    # Sialorrhea  Secondary to clozapine.   Strategies like use of towel helping  # Nasal Congestion  - Ocean nasal spray PRN    # tachycardia  - concern for myocarditis  - HR almost double baseline 71 b/m  - cardiac enzymes normal, follow up any eosinophilia in rpt counts  - asymptomatic still - EKGs have been normal. Will follow up need for ECHO- literature review suggest cardiac MRI as gold standard for diagnostics (other than biopsy)  - IM consult appreciate thorough review and follow up  - Ipratropium stopped - not to restart  - May need to trend, note that did not send when symptomatic as no symptom elaborated (which may be hard due to mental status)  - Appreciate follow up and IM recs for Beta-blocker - increased Metoprolol 12.5 mg BID      Data: As above.  Consults: None.    Legal Status: Commitment with [facility].    Safety Assessment:   Behavioral Orders   Procedures     Code 1 - Restrict to Unit     Elopement precautions     Fall precautions     Routine Programming     As clinically indicated     Status 15     Every 15 minutes.     Suicide precautions     Patients on Suicide Precautions should have a Combination Diet ordered that includes a Diet selection(s) AND a Behavioral Tray selection for Safe Tray - with utensils, or Safe Tray - NO utensils         Disposition: TBD pending clinical stabilization and medication optimization.     Patient was seen and discussed with my attending Dr Missy Kiran MD .    Jessica Lim MD,  PGY1 Psychiatry resident    Attestation:  This patient has been seen and evaluated by me, Missy Kiran.  I have discussed this patient with the psychiatry resident and I agree with the findings and plan in this note.    I have reviewed today's vital signs, medications, labs and imaging.   Missy Kiran MD.

## 2019-01-16 NOTE — PLAN OF CARE
Occupational Therapy Goals    Pt attended 1 out of 2 OT groups offered. Pt attended occupational therapy clinic. She declined initiating a task today, and instead sat quietly and made music selections. Offered one word responses when conversation was initiated with her. She presented with a flat, vacant affect throughout this group. Pt minimally participated in about x20 minutes (no charge) of a structured occupational therapy group with a focus on self-awareness and socialization. Pt declined responding to prompts, and was unable to follow 2 step task directions, appearing disorganized. She smiled x1 in the context of peers laughing, otherwise appeared vacant with intense staring observed. Will continue to assess.

## 2019-01-16 NOTE — PROGRESS NOTES
RE - Day Treatment assessment      Thursday January 24, 2019 - 9:00am - Assessment - Melissa Rodriguez  [facility] Day Treatment Located in [facility] Floor [address] ; [address] [place] [address] Phone:[phone]

## 2019-01-16 NOTE — PROGRESS NOTES
RE - day treatment referral    Discharge/ provisional discharge planning ongoing including referral for day treatment within [facility]. This writer received voicemail from Archana with day treatment stating received referral. Would like to discuss including potential dates next week Tuesday or Thursday for assessment. Reports that order is for assessment to be completed when out patient, if team would like to have ability for assessment to happen in patient if cancellation / open spot would need to change order.   This writer will follow up with Archana and team re coordination of above.

## 2019-01-16 NOTE — PROGRESS NOTES
RE - discharge/ Provisional Discharge planning    This writer spoke with patient's [facility] contracted  via [facility] Lelo Latham, ph [phone] re discharge plan and timeline - anticipated discharge date of Friday. Discussed day treatment referral and assessment time also discussed medical transportation - that form for exemption from public transportation to allow for door to door has been submitted to [facility] - this writer will follow up in morning.  Will also schedule with out patient provider and in home clozapine monitoring. Information Lelo that provider on team has spoken with out patient provider to update on medication plan etc.   Lelo states she met with patient's father yesterday will continue working with patient and family on after care in the community . She supports plan for Provisional Discharge.

## 2019-01-16 NOTE — TELEPHONE ENCOUNTER
Writer called [facility]. Pt is not discharging tonight.  Writer left a message with the CTC, Vanda, regarding options for a DA next week.  Will await a return phone call.

## 2019-01-17 PROCEDURE — 25000132 ZZH RX MED GY IP 250 OP 250 PS 637: Performed by: STUDENT IN AN ORGANIZED HEALTH CARE EDUCATION/TRAINING PROGRAM

## 2019-01-17 PROCEDURE — 99231 SBSQ HOSP IP/OBS SF/LOW 25: CPT | Mod: GC | Performed by: PSYCHIATRY & NEUROLOGY

## 2019-01-17 PROCEDURE — 12400001 ZZH R&B MH UMMC

## 2019-01-17 PROCEDURE — H2032 ACTIVITY THERAPY, PER 15 MIN: HCPCS

## 2019-01-17 PROCEDURE — 25000132 ZZH RX MED GY IP 250 OP 250 PS 637: Performed by: PHYSICIAN ASSISTANT

## 2019-01-17 RX ORDER — ALUMINA, MAGNESIA, AND SIMETHICONE 2400; 2400; 240 MG/30ML; MG/30ML; MG/30ML
30 SUSPENSION ORAL 4 TIMES DAILY PRN
Qty: 1 BOTTLE | Refills: 1 | Status: SHIPPED | OUTPATIENT
Start: 2019-01-17 | End: 2019-02-16

## 2019-01-17 RX ORDER — LORAZEPAM 1 MG/1
1 TABLET ORAL 3 TIMES DAILY
Qty: 50 TABLET | Refills: 0 | Status: SHIPPED | OUTPATIENT
Start: 2019-01-17 | End: 2019-01-17

## 2019-01-17 RX ORDER — CLOZAPINE 50 MG/1
250 TABLET ORAL AT BEDTIME
Qty: 150 TABLET | Refills: 1 | Status: SHIPPED | OUTPATIENT
Start: 2019-01-17

## 2019-01-17 RX ORDER — ACETAMINOPHEN 325 MG/1
650 TABLET ORAL EVERY 4 HOURS PRN
Qty: 15 TABLET | Refills: 1 | Status: SHIPPED | OUTPATIENT
Start: 2019-01-17

## 2019-01-17 RX ORDER — METOPROLOL TARTRATE 25 MG/1
12.5 TABLET, FILM COATED ORAL 2 TIMES DAILY
Qty: 30 TABLET | Refills: 1 | Status: SHIPPED | OUTPATIENT
Start: 2019-01-17

## 2019-01-17 RX ORDER — LITHIUM CARBONATE 300 MG/1
1200 TABLET, FILM COATED, EXTENDED RELEASE ORAL AT BEDTIME
Qty: 120 TABLET | Refills: 1 | Status: SHIPPED | OUTPATIENT
Start: 2019-01-17

## 2019-01-17 RX ORDER — DOCUSATE SODIUM 250 MG
250 CAPSULE ORAL DAILY
Qty: 30 CAPSULE | Refills: 1 | Status: SHIPPED | OUTPATIENT
Start: 2019-01-18

## 2019-01-17 RX ORDER — LORAZEPAM 1 MG/1
1 TABLET ORAL 3 TIMES DAILY
Qty: 90 TABLET | Refills: 0 | Status: SHIPPED | OUTPATIENT
Start: 2019-01-18

## 2019-01-17 RX ORDER — POLYETHYLENE GLYCOL 3350 17 G/17G
17 POWDER, FOR SOLUTION ORAL DAILY
Qty: 30 PACKET | Refills: 1 | Status: SHIPPED | OUTPATIENT
Start: 2019-01-18

## 2019-01-17 RX ORDER — METOPROLOL TARTRATE 25 MG/1
25 TABLET, FILM COATED ORAL 2 TIMES DAILY
Status: DISCONTINUED | OUTPATIENT
Start: 2019-01-17 | End: 2019-01-18 | Stop reason: HOSPADM

## 2019-01-17 RX ADMIN — Medication 12.5 MG: at 08:41

## 2019-01-17 RX ADMIN — METOPROLOL TARTRATE 25 MG: 25 TABLET, FILM COATED ORAL at 21:10

## 2019-01-17 RX ADMIN — LORAZEPAM 1 MG: 1 TABLET ORAL at 21:10

## 2019-01-17 RX ADMIN — CLOZAPINE 250 MG: 100 TABLET ORAL at 21:09

## 2019-01-17 RX ADMIN — LORAZEPAM 1 MG: 1 TABLET ORAL at 14:46

## 2019-01-17 RX ADMIN — LORAZEPAM 1 MG: 1 TABLET ORAL at 08:41

## 2019-01-17 RX ADMIN — VITAMIN D, TAB 1000IU (100/BT) 2000 UNITS: 25 TAB at 14:47

## 2019-01-17 RX ADMIN — POLYETHYLENE GLYCOL 3350 17 G: 17 POWDER, FOR SOLUTION ORAL at 08:41

## 2019-01-17 RX ADMIN — DOCUSATE SODIUM 250 MG: 250 CAPSULE, LIQUID FILLED ORAL at 08:41

## 2019-01-17 RX ADMIN — LITHIUM CARBONATE 1200 MG: 300 TABLET, EXTENDED RELEASE ORAL at 21:11

## 2019-01-17 ASSESSMENT — ACTIVITIES OF DAILY LIVING (ADL)
DRESS: INDEPENDENT
ORAL_HYGIENE: INDEPENDENT
HYGIENE/GROOMING: INDEPENDENT
LAUNDRY: WITH SUPERVISION
LAUNDRY: UNABLE TO COMPLETE
HYGIENE/GROOMING: INDEPENDENT
DRESS: INDEPENDENT
ORAL_HYGIENE: INDEPENDENT

## 2019-01-17 ASSESSMENT — MIFFLIN-ST. JEOR: SCORE: 1376.84

## 2019-01-17 NOTE — PROGRESS NOTES
Brief Medicine Note  January 17, 2019     Dannie Davis is a 23 year old female with a history of psychosis (schizophrenia vs BPAD vs other psychotic disorder), asthma, and GERD who was admitted to inpatient behavioral health on 11/21/18 with psychosis in the setting of medication non-adherence. Medicine following for persistent tachycardia.     Sinus Tachycardia. Intermittent since admission. Onset preceded Clozaril initiation on 12/10. EKG x 2 (12/28 & 1/8) showed sinus tachycardia. CBC wnl on 1/7/19 without eosinophilia. Trop neg and TSH wnl. Echo (1/11) with LVEF 60-65%, unable to assess diastolic function, normal RV size/function, no significant valvular dysfunction or pericardial effusion. Unable to assess for symptoms given psychiatric decompensation. Low clinical suspicion for myocarditis given afebrile, normal Trop and eosinophil. Suspect tachycardia is multifactorial r/t psychiatric decompensation and Clozaril use. Started on metoprolol 6.25mg BID 1/12 with uptitration on 1/13. HRs persistently >100, ranging .   - Ensure adequate hydration   - Increase metoprolol to 25mg BID today, monitor and uptitrate as needed  - Defer ongoing Clozaril use to Psychiatry, though would encourage weighing risk vs benefits especially in the setting of tachycardia with minimal improvement in psychotic symptoms.     Medicine will continue to chart review HR and titrate Metoprolol as needed.      Brenda Arriaga PA-C

## 2019-01-17 NOTE — PROGRESS NOTES
Pt occasionally made eye contact and smiled, though more often vacant staring.  Movement was rhythmic and small, but in sync with both the music and the group.       01/17/19 1015   Dance Movement Therapy   Type of Intervention structured groups   Response participates with encouragement   Hours 1

## 2019-01-17 NOTE — PROGRESS NOTES
Plan is patient will discharge tomorrow morning . Tentative plan is father/parents will  at 10:00am . Dad asked that hospital staff call him to say if 10:00am is the time he should come for sure.

## 2019-01-17 NOTE — PROGRESS NOTES
Thursday January 31, 2019 - 8:30am Psychiatry Provider - Guillermo Li DNP, PMHNP  [facility]  [address] [place], [address]. Phone: [phone] Fax: [phone]    This writer spoke with mental health staff at [facility] clinic scheduled patient for above post hospital psychiatry follow up. Also confirmed that they do prefer [facility] for clozapine maintenance.

## 2019-01-17 NOTE — PROGRESS NOTES
"    ----------------------------------------------------------------------------------------------------------  [facility], [place]   Psychiatric Progress Note  Hospital Day #57     Interim History:   The patient's care was discussed with the treatment team and chart notes were reviewed.    Sleep: 7 hrs  Scheduled Medications: Received.  Interval history:  Patient has been calm and without any behavioral outbursts. She has attended some OT and is present in the milieu. She does not interact much and keeps to herself. Is noted to be vacant staring at times. Less sedated and tired though. She was pleasant, is more comfortable but not inappropriate in her approach to interviewer. She says that she wants to wear her home clothes, which interviewer agreed to as a good idea. She denied hallucinations, delusions or thought phenomenon. She said her mind was clearer. Discharge plan including day treatment assessment next week and follow up at [facility] clinic was discussed with patient and family. Informed about improved tachycardia. Also discussed plan for gradual reduction in medication doses as outpatient.  No palpitations or chest pain reported.   She did not have any other adverse effects with any of her current medications.  The risks, benefits, alternatives and side effects of any medication changes have been discussed and are understood by the patient and other caregivers.      Review of Systems:   4 point ROS within normal limits.          Allergies:     Allergies   Allergen Reactions     Septra [Sulfamethoxazole W/Trimethoprim]             Psychiatric Examination:   /79 (BP Location: Left arm)   Pulse 86   Temp 97.3  F (36.3  C) (Oral)   Resp 16   Ht 1.702 m (5' 7\")   Wt 57.2 kg (126 lb)   SpO2 100%   BMI 19.73 kg/m    Weight is 126 lbs 0 oz  Body mass index is 19.73 kg/m .    Appearance: In the milieu, more awake and alert. Wearing hospital scrubs and has a weave " in.  Attitude:Co-operative.  Eye Contact: good  Mood: better, want to go home  Affect:  More reactive, improved range  Speech: more verbal today  Psychomotor Behavior: No retardation of movement. No evidence of stereotypic movements, tardive dyskinesia, dystonia or tics. Mild upper limb tremor.   Thought Process: linear.  Associations: No loose associations.  Thought Content: no psychosis  Insight: Poor - improving  Judgment: Poor.  Attention Span and Concentration: better.  Recent and Remote Memory: Recent memory intact.  Language: Speaking fluent English with strong accent.  Fund of Knowledge: Difficult to assess.  Muscle Strength and Tone: Appearance is grossly normal, not obviously stiff. Mild EPS.  Gait and Station: normal appearing gait     Labs:     Lithium 1/16/19 - 1.12 mmol/l     Assessment    Diagnostic Impression: Ms. Davis is a 23 y.o. F with a psychiatric history of MDD with psychotic features who presented to the [facility] [place] ED via EMS on 11/21/2018 due to psychosis in the setting of medication non-adherence. Her primary presentation is psychosis, as evidenced by reported and witnessed visual and auditory hallucinations including command hallucinations taking the form of her father, delusions that her father is trying to poison her, stereotypic hand movements, writhing leg movements, echopraxia, and delayed speech latency. Demonstrates emotional lability from calm baseline, with rapid transition to and from sobbing or dancing/singing/laughing. Sleep maintenance is poor and fluctuating. Given her prior diagnosis of MDD with psychosis and her current presentation with acute psychosis in the absence of depressive sxs, and disorganized thinking, a thought disorder seems to be the most likely pathology. Ddx includes schizoaffective disorder and less likely bipolar depression or MDD with psychotic features. Current symptoms, along with initial improvement with lorazepam suggest component of catatonia to her  current unspecified condition.     Hospital Course: Ms. Davis was admitted to [facility] on a 72 hour hold. Treatment team filed for commitment and [facility] which have been received. Olanzapine was started and uptitrated. PRN hydroxyzine and trazodone were initiated. There was some concern for catatonia given stereotypic movements and poor olanzapine response so she was given lorazepam with marked clinical improvement. Scheduled lorazepam was started. Not in full remission with olanzapine so attempted transition to aripiprazole which she has previously taken with good reported response, but she had recurrence of delusions, increased lability, new hyperactivity/agitation, and worsening sleep maintenance, so she is transitioning back to olanzapine. After demonstrating poor symptom improvement, clozapine was started and titrated while olanzapine was tapered off. She was tachycardic in the 120s without symptoms and was closely followed up , with rpt EKG WNL. With background mood lability, hyper-religiosity, periods of psychomotor agitation, Lithium was started on 12/31/18. With sub-therapeutic levels dose was increased to 1200 mg/day (1.12 mmol/L). With concern of excess sedation and clinical assessment of ongoing mood lability and no clear other catatonia, night lorazepam was reduced and close monitoring planned. With persisting tachycardia even on Metoprolol, Clozapine dose was reduced. Team contacted Guillermo Li, Primary psychiatry provider, [facility] and discussed continued plan of care including gradual reduction in Lorazepam, review Clozapine dose - reduction/change as Outpatient, and plan for day treatment which they agreed with. Early outpatient appointment was also scheduled.     Medical Course: CMP, lipid panel, TSH, B12/folate, CBC, sed rate, lyme, treponema, HIV, EMERALD, ceruloplasmin, ECG WNL. Vitamin D low. UA non-infectious. Demonstrated orthostasis, attributable to up-titration of olanzapine. Repeat CBC w/ diff  WNL. Bowel regimen initiated for constipation. Ipratropium spray started for sialorrhea stopped as tachycardic. CMP WNL. She was evaluated for Tachycardia, had normal cardiac enzymes, no eosinophila, normal EKGs and ECHO and was started on Metoprolol 6.25 mg BID and increased to 12.5mg BID as she continued to be tachycardic. Medicine also suggested review of Clozapine in the presence of this now chronic tachycardia. By the time of discharge, heart rate had stabilized in the 80s.       Plan   Principal Diagnosis:   # Unspecified Schizophrenia Spectrum and Other Psychotic Disorder dd -BPAD- Mixed affective with psychosis  # R/O Catatonia    Psychotropic Medications:  Modify:  - Decreased Clozapine to 250 mg daily (1/15/18 )  - Lithium 1200 mg a day (1.12 mmol/L) on 1/16/19    Continue:  - Haldol 5 mg PO Q4H PRN   - Lorazepam 1 mg TID   - Hydroxyzine 25 mg Q4H PRN   - Ocean spray Q1H PRN for nasal congestion    Options considered if continued poor response - scheduling haloperidol, adding Lithium for suspected mood-catatonia (excited stupor), review depression presentation- serotonin specific reuptake inhibitor trial or  ECT.  At this time, with improvements noted, and noted significant sedation and sialorrhea, consider reduction in Lorazepam after Lithium levels and re-evaluate clozapine dose.  Patient will be treated in therapeutic milieu with appropriate individual and group therapies as described.  WBC counts reviewed and normal.    Medical diagnoses to be addressed this admission:    # Vitamin D Deficiency  - Continue ergocalciferol 50,000 Units Q7 days    # Constipation  - Miralax 17 g daily  - Docusate 250 mg daily  - Mylanta/Maalox QID PRN    # Headache/Neck Pain   - Ibuprofen 400 mg Q6H PRN  - Acetaminophen PRN  - Menthol Topical Analgesic Q6H PRN    # Sialorrhea  Secondary to clozapine.   Strategies like use of towel helping  # Nasal Congestion  - Ocean nasal spray PRN    # tachycardia  - concern for  myocarditis  - HR almost double baseline 71 b/m  - cardiac enzymes normal, follow up any eosinophilia in rpt counts  - asymptomatic - EKGs normal.  ECHO normal   - Ipratropium stopped - not to restart  - Appreciate follow up and IM recs for Beta-blocker - increased Metoprolol 12.5 mg BID      Data: As above.  Consults: None.    Legal Status: Commitment with [facility].    Safety Assessment:   Behavioral Orders   Procedures     Code 1 - Restrict to Unit     Elopement precautions - discontinue today     Fall precautions - discontinue today     Routine Programming     As clinically indicated     Status 15     Every 15 minutes.     Suicide precautions  - discontinue today     Patients on Suicide Precautions should have a Combination Diet ordered that includes a Diet selection(s) AND a Behavioral Tray selection for Safe Tray - with utensils, or Safe Tray - NO utensils         Disposition: TBD pending clinical stabilization and medication optimization, tentative discharge tomorrow     Patient was seen and discussed with my attending Dr Missy Kiran MD .    Jessica Lim MD,  PGY1 Psychiatry resident    Attestation:  This patient has been seen and evaluated by me, Missy Kiran.  I have discussed this patient with the psychiatry resident and I agree with the findings and plan in this note.    I have reviewed today's vital signs, medications, labs and imaging.   Missy Kiran MD.

## 2019-01-17 NOTE — PROGRESS NOTES
Pt was isolative and socially withdrawn throughout most of the shift. Pt was mostly observed sleeping in room but was also seen listening to music. Pt did not attend community meeting. Pt presented with a blunt/flat affect and appeared tired. Pt did not report experiencing SI, HI, or SIB and no indication of these behaviors was observed.        01/16/19 8416   Behavioral Health   Hallucinations denies / not responding to hallucinations   Thinking poor concentration   Orientation person: oriented   Memory (unsubstantiated)   Insight poor   Judgement impaired   Eye Contact at examiner   Affect blunted, flat   Mood mood is calm   Physical Appearance/Attire attire appropriate to age and situation   Hygiene neglected grooming - unclean body, hair, teeth   Suicidality (pt did not report thoughts or urges)   Self Injury (pt did not report thoughts or urges)   Elopement (no apparent risk)   Activity isolative;withdrawn   Speech clear;coherent   Medication Sensitivity no stated side effects;no observed side effects   Psychomotor / Gait balanced;steady   Psycho Education   Type of Intervention structured groups   Response refuses   Hours 0.5   Treatment Detail (pt did not attend community meeting)   Activities of Daily Living   Hygiene/Grooming independent   Oral Hygiene independent   Dress street clothes;independent   Laundry (pt did not complete)   Room Organization independent   Activity   Activity Assistance Provided independent

## 2019-01-17 NOTE — PLAN OF CARE
BEHAVIORAL TEAM DISCUSSION    Participants:   Missy Kiran MD Attending Psychiatrist  Jessica Lim MD PGY1  Cruz Pacheco MD PGY1  Vanda Weiss MSW, [facility] Clinical Treatment Coordinator  GLENDY George MS4  Progress: progress overall has been slow with recent improvement  Continued Stay Criteria/Rationale: finalization of safe discharge plan  Medical/Physical: see psychiatry physician progress note:   Precautions:   Behavioral Orders   Procedures    Code 1 - Restrict to Unit    Routine Programming     As clinically indicated    Status 15     Every 15 minutes.     Plan: continue with medication management and assessment. Plan for discharge to home tomorrow if no acute issues arise. patient will discharge with a provisional discharge agreement and with out patient follow up.   Rationale for change in precautions or plan: per ongoing assessment.

## 2019-01-17 NOTE — PROGRESS NOTES
Pt was visible in the milieu and attended groups. Pt would initially appear blunted/flat and tense in group, but as groups progressed her affect brightened. Pt had a good visit with her parents, approached staff with her needs throughout the day. Pt denies SI/SIB, hallucinations, and medication side affects.      01/17/19 1300   Behavioral Health   Hallucinations denies / not responding to hallucinations   Thinking poor concentration;distractable   Orientation person: oriented;place: oriented   Memory baseline memory   Insight poor   Judgement impaired   Eye Contact at examiner   Affect blunted, flat   Mood mood is calm   Physical Appearance/Attire attire appropriate to age and situation   Hygiene well groomed   Suicidality other (see comments)  (denies)   1. Wish to be Dead No   2. Non-Specific Active Suicidal Thoughts  No   Self Injury other (see comment)  (denies)   Activity isolative;withdrawn   Speech coherent;clear   Medication Sensitivity no stated side effects;no observed side effects   Psychomotor / Gait balanced;steady   Activities of Daily Living   Hygiene/Grooming independent   Oral Hygiene independent   Dress independent   Laundry with supervision   Room Organization independent   Activity   Activity Assistance Provided independent

## 2019-01-18 VITALS
OXYGEN SATURATION: 100 % | TEMPERATURE: 98.6 F | WEIGHT: 131 LBS | BODY MASS INDEX: 20.56 KG/M2 | RESPIRATION RATE: 16 BRPM | HEIGHT: 67 IN | DIASTOLIC BLOOD PRESSURE: 83 MMHG | SYSTOLIC BLOOD PRESSURE: 117 MMHG | HEART RATE: 111 BPM

## 2019-01-18 PROCEDURE — 25000132 ZZH RX MED GY IP 250 OP 250 PS 637: Performed by: STUDENT IN AN ORGANIZED HEALTH CARE EDUCATION/TRAINING PROGRAM

## 2019-01-18 PROCEDURE — 25000132 ZZH RX MED GY IP 250 OP 250 PS 637: Performed by: PHYSICIAN ASSISTANT

## 2019-01-18 PROCEDURE — 99238 HOSP IP/OBS DSCHRG MGMT 30/<: CPT | Mod: GC | Performed by: PSYCHIATRY & NEUROLOGY

## 2019-01-18 RX ADMIN — METOPROLOL TARTRATE 25 MG: 25 TABLET, FILM COATED ORAL at 09:28

## 2019-01-18 RX ADMIN — POLYETHYLENE GLYCOL 3350 17 G: 17 POWDER, FOR SOLUTION ORAL at 09:27

## 2019-01-18 RX ADMIN — VITAMIN D, TAB 1000IU (100/BT) 2000 UNITS: 25 TAB at 09:27

## 2019-01-18 RX ADMIN — LORAZEPAM 1 MG: 1 TABLET ORAL at 09:27

## 2019-01-18 RX ADMIN — DOCUSATE SODIUM 250 MG: 250 CAPSULE, LIQUID FILLED ORAL at 09:27

## 2019-01-18 NOTE — PROGRESS NOTES
Patient discharged to home on a provisional discharge. She was accompanied by her parents. Discussed discharge instructions with patient and parents. Received take home medications. Betty Freeman RN

## 2019-01-18 NOTE — PROGRESS NOTES
RE - Provisional Discharge    Met with patient and parents , explained Provisional Discharge Agreement and patient did sign.   Copy sent to  for co signature and submission to court.

## 2019-01-18 NOTE — DISCHARGE INSTRUCTIONS
Behavioral Discharge Planning and Instructions      Summary:  You were admitted on 11/21/2018  due to Psychotic Symptomology.  You were treated by Dr. Mauro Vann MD and discharged on 1/18/19 from [facility] to [facility]    During your hospitalization a petition for commitment as a person who is mentally ill (court ordered treatment) was pursued through [facility] and was supported. The court committed dually to [facility] and the Commissioner of Human Services on 12/6/18 and order Authorizing Use of Neuroleptics. Today you are being discharged from the hospital on a Provisional Discharge - Thi Provisional Discharge Agreement shall remain in effect for the duration of the Commitment which will be effective minimally until 6/6/2019 (Unless the commitment has been terminated or has been continued pursuant to [place]   253B.12 before that date.). Please refer to your copy of your Provisional Discharge Agreement for further details. In the future you may also request a copy of your agreement through your [facility] contracted .      Principal Diagnosis:   Unspecified Schizophrenia Spectrum and Other Psychotic Disorder dd -BPAD- Mixed affective with psychosis  [facility] Care Follow-up Appointments:       Tuesday January 22, 2019 - 10:00am - Leni Murdock PharmD Telephone Appointment Medication Therapy Management -   You will receive a call for this appointment which will be over the phone. You have identified the best phone number to reach you as [phone] or [phone]      Thursday January 24, 2019 - 9:00am   Day Treatment Intake Assessment - Melissa Rodriguez  [facility] Located in [facility] Floor [address] ; [address] [place] [address] Phone:[phone]       Thursday January 31, 2019 - 8:30am Psychiatry Provider - Guillermo Li DNP, PMRUBIOP  [facility]  [address]  [place], [address]. Phone: [phone] Fax: [phone]    Lelo Latham-   Phone: [phone] Fax: [phone]  [facility]  -   [facility]  [address] [place], [address].Continue working with your  for ongoing access to resources in the community and to allow for monitoring of the terms of your Commitment Order.     Medical Transportation - [facility] at [phone].  Call to schedule rides for appointments at least 2 days in advance. Your  Lelo will assist with this ongoing.     Clozaril Instructions:    You have been referred to [facility] for ongoing maintenance of your Clozaril medication. They have been sent your information and will be contacting you. You should follow up and call them if you do not receive a call within 24 hours. Phone [phone]    With this medication you will need weekly lab draws in order for your medication to be filled by a pharmacy. This medication is only distributed 7 days at a time initially.     [facility] will establish with you someone to come to your home to do the lab draw and will have your medication sent to you at home.       Attend all scheduled appointments with your outpatient providers. Call at least 24 hours in advance if you need to reschedule an appointment to ensure continued access to your outpatient providers.   Major Treatments, Procedures and Findings:  You were provided with: a psychiatric assessment, assessed for medical stability, medication evaluation and/or management and milieu management    Symptoms to Report: increased confusion, losing more sleep, mood getting worse or thoughts of suicide    Early warning signs can include: increased depression or anxiety increased thoughts or behaviors of suicide or self-harm  increased unusual thinking, such as paranoia or hearing  voices    Safety and Wellness:  Take all medicines as directed.  Make no changes unless your doctor suggests them.      Follow treatment recommendations.  Refrain from alcohol and non-prescribed drugs.  If there is a concern for safety, call 911.    Resources:   Crisis Intervention: 320.499.8867 or 580-289-8388 (TTY: 827.836.3846).  Call anytime for help.  National [place] on Mental Illness (www.[place].enrique.org): 852.602.5927 or 223-422-0302.  National Suicide Prevention Line (www.mentalhealthmn.org): 141-088-TAKA (7657)  Mental Health Consumer/Survivor Network of [place] (www.mhcsn.net): 374.866.4761 or 784-213-6818  Mental Health Association [place] (www.mentalhealth.org): 179.237.2574 or 611-682-3095  Self- Management and Recovery Training., SMART-- Toll free: 946.851.2187  www.ClickSquared.eBrevia  [facility] Crisis (COPE) Response - Adult 452 065-8423  Crisis Intervention: 260.143.7987 or 490-017-8172. Call anytime for help.     The treatment team has appreciated the opportunity to work with you.     If you have any questions or concerns our unit number is [phone]  You may be receiving a follow-up phone call within the next three days from a representative from behavioral health.    You have identified the best phone number to reach you as [phone] or [phone]

## 2019-01-18 NOTE — PROGRESS NOTES
RE - Clozapine Monitoring     Orders for ongoing in home Clozapine monitoring completed by provider and sent to [facility] via fax by this writer.

## 2019-01-22 ENCOUNTER — ALLIED HEALTH/NURSE VISIT (OUTPATIENT)
Dept: PHARMACY | Facility: CLINIC | Age: 24
End: 2019-01-22
Payer: COMMERCIAL

## 2019-01-22 DIAGNOSIS — K59.03 DRUG-INDUCED CONSTIPATION: ICD-10-CM

## 2019-01-22 DIAGNOSIS — G44.209 TENSION HEADACHE: ICD-10-CM

## 2019-01-22 DIAGNOSIS — N89.8 VAGINAL ITCHING: ICD-10-CM

## 2019-01-22 DIAGNOSIS — E55.9 VITAMIN D DEFICIENCY: ICD-10-CM

## 2019-01-22 DIAGNOSIS — R00.0 SINUS TACHYCARDIA: ICD-10-CM

## 2019-01-22 DIAGNOSIS — F29 PSYCHOSIS, UNSPECIFIED PSYCHOSIS TYPE (H): Primary | ICD-10-CM

## 2019-01-22 DIAGNOSIS — G47.09 OTHER INSOMNIA: ICD-10-CM

## 2019-01-22 PROCEDURE — 99605 MTMS BY PHARM NP 15 MIN: CPT | Performed by: PHARMACIST

## 2019-01-22 PROCEDURE — 99607 MTMS BY PHARM ADDL 15 MIN: CPT | Performed by: PHARMACIST

## 2019-01-22 NOTE — PATIENT INSTRUCTIONS
Recommendations from today's MTM visit:                                                    MTM (medication therapy management) is a service provided by a clinical pharmacist designed to help you get the most of out of your medicines.   Today we reviewed what your medicines are for, how to know if they are working, that your medicines are safe and how to make your medicine regimen as easy as possible.     1. Stop taking Maalox for constipation.  Only use it for heartburn or upset stomach.    2. Schedule an appointment with primary care to look at your itching.    3. Talk to your psychiatry provider (Guillermo Li) about drooling and trouble sleeping.    Next MTM visit: as needed    To schedule another MTM appointment, please call the clinic directly or you may call the MTM scheduling line at [phone] or toll-free at [phone].     My Clinical Pharmacist's contact information:                                                      It was a pleasure talking with you today!  Please feel free to contact me with any questions or concerns you have.      Leni Murdock, Elen  Medication Therapy Management Pharmacist  [facility]  Phone: [phone]    You may receive a survey about the MTM services you received.  I would appreciate your feedback to help me serve you better in the future. Please fill it out and return it when you can. Your comments will be anonymous.

## 2019-01-22 NOTE — PROGRESS NOTES
"/SUBJECTIVE/OBJECTIVE:                Dannie Davis is a 23 year old female called for a transitions of care visit.  She was discharged from [facility] on 1/18/19 after a 2 month stay for psychosis.  Patient's family member (mother?) was on the call in the background, helping pt answer questions.  There may have been some language barrier in getting complete information.    Chief Complaint: \"My tummy was big\"    Allergies/ADRs: Reviewed in Epic  Tobacco: No tobacco use   Alcohol: not currently using  PMH: Reviewed in Epic    Medication Adherence/Access:  no issues reported; parents help with meds    Psychosis: Pt was recently hospitalized for psychosis symptoms, identified by auditory and visual hallucinations, delusions of father poisoning her food, emotional lability, and flat affect.  She had been hospitalized at [facility] April 2017, soon after moving to  from [facility] in January 2017.  At time of most recent admission (11/2018), she reported having not taken any medication for 6 months.  Pt is currently taking clozapine 250mg at bedtime, lithium ER 1200mg at bedtime, and lorazepam 1mg TID.  Pt and family member reported that symptoms are much improved and pt feels that her emotions are more stable.  She denied any anxiety, VH/AH, tremor, or increased urination.  She had been on clozapine in the past and is set up with [facility] for monitoring.  She did endorse constipation and noted that her \"tummy was big,\" but that she had a BM this morning (first since discharge 5 days ago).  Pt also described drooling overnight, which she finds bothersome.  She did not recall using a medication for this in the past.    1/16/19 Li level: 1.12mmol/L    Constipation: Pt is currently taking docusate 250mg daily and Miralax 17g daily, mixed in 8oz water.  She also has Maalox on her medication list for heartburn, which she reported using 2-3 times daily for constipation.  She reported that her \"tummy was big\" and she was " "having stomach pains, which are much improved since having BM this morning (first since discharge 5 days ago).  Denied any diarrhea or medication side effects.    Insomnia: Pt described having difficulty falling and staying asleep, though it was difficult to gather additional information.  At one point, pt reported that she sleeps for about 8 hours, but another time stated 2-3hours.  Unsure whether she sleeps for 2-3 hours at a time or total during the night.    Vaginal Itching: Upon asking about any urinary frequency (possibly related to lithium), she denied the symptom, but reported that her \"vagina has itch,\" which she said was not addressed in the hospital.  She denied that symptoms have improved.  It was mentioned in ED note, that she was originally en route to outpatient appointment for evaluation of the itchiness, but tried jumping out of the car and father was fearful for her safety, so brought her to the hospital instead.    Sinus Tachycardia: Current medications include metoprolol 12.5mg BID.  Notes indicate that ST was present prior to clozapine initiation, likely due to psychiatric decompensation.  Heart rate was lowered during the course of medication titration and remained 86-116bpm during the few days prior to discharge.  Patient reports no current medication side effects.    Headache: Pt takes acetaminophen 650mg daily in the morning on many days for headache, which she finds helpful.  Denied side effects.  Vitamin D Deficiency: Pt is currently taking Vitamin D 2000units daily.  Her level on 11/22/18 was 17ug/L.  Denied side effects.    Today's Vitals: There were no vitals taken for this visit. phone visit    ASSESSMENT:                 Current medications were reviewed today.      Medication Adherence: good, no issues identified    Psychosis: Seems improved. Encouraged continued medication adherence.  Continue with [facility] for clozapine monitoring with weekly blood draws.  Pt has psychiatry " appointment next Thursday, 1/31/19 at [facility].  She will have follow up lithium monitoring at that time.  Last level WNL, though higher end of range, at current dosing.  Pt may benefit from ipratropium spray for nocturnal sialorrhea- pt will discuss with psych.    Constipation: Encouraged continued daily docusate and Miralax and adequate fluid intake.  Recommended that patient only use Maalox as needed for heartburn, not for constipation- pt agreed.  Insomnia: Unclear how much sleep patient is getting.  Recommended patient discuss difficulty at psychiatry appointment next week.  Vaginal Itching:  Unable to find any assessment notes during hospitalization. Encouraged patient to schedule PCP appointment for assessment- pt agreed.    Sinus Tachycardia: Continue current medication.  Headache: Continue current medication and recommended follow up with PCP if frequent headaches continue.  Vitamin D Deficiency: Continue current medication.    PLAN:                  1. Pt will discuss sialorrhea and insomnia with psychiatry provider next week.  Consider starting ipratropium spray.  2. Pt will schedule PCP appointment vaginal itching and headaches.  3. Recommended patient discontinue using Maalox for constipation.    I spent 60 minutes with this patient today. A copy of the visit note was provided to the patient's primary care and psychiatry provider.    Will follow up as needed.    The patient was mailed a summary of these recommendations as an after visit summary.    Leni Murdock, PharmD  Medication Therapy Management Pharmacist  [facility]  Phone: [phone]

## 2019-02-21 ENCOUNTER — TELEPHONE (OUTPATIENT)
Dept: BEHAVIORAL HEALTH | Facility: CLINIC | Age: 24
End: 2019-02-21

## 2019-02-21 ENCOUNTER — BEH TREATMENT PLAN (OUTPATIENT)
Dept: BEHAVIORAL HEALTH | Facility: CLINIC | Age: 24
End: 2019-02-21

## 2019-02-21 ENCOUNTER — HOSPITAL ENCOUNTER (OUTPATIENT)
Dept: BEHAVIORAL HEALTH | Facility: CLINIC | Age: 24
Discharge: HOME | End: 2019-02-21
Attending: PSYCHIATRY & NEUROLOGY | Admitting: PSYCHIATRY & NEUROLOGY
Payer: COMMERCIAL

## 2019-02-21 PROCEDURE — 90791 PSYCH DIAGNOSTIC EVALUATION: CPT

## 2019-02-21 ASSESSMENT — PAIN SCALES - GENERAL: PAINLEVEL: NO PAIN (0)

## 2019-02-21 NOTE — PROGRESS NOTES
" Standard Diagnostic Assessment     CLIENT'S NAME: Dannie Davis  MRN:   [id number]  :   1995 AGE:23 year old SEX: female  ACCT. NUMBER: [id number]  DATE OF SERVICE: 19 Start Time:  9:58 am End Time:  11:15 am    Home Phone [phone]   Work Phone Not on file.   Mobile Not on file.     Preferred Phone: [phone]  May we leave a program related message? yes    Yes, the patient has been informed that any other mental health professional providing mental health services to me will need access to this Diagnostic Assessment in order to develop a treatment plan and receive payment.     Identifying Information:  Dannie Davis is a 23 year old, denies, single female. Dannei attended the DA  alone. Mother attended appointment but was not present during assessment    Reason for Referral: Dannie was referred to Day Treatment (DT)  by doctor and mom. Dannie reports the reason for referral at this time is not sure.  Wants day treatment to be more active.  I need help.  I do not have symptoms.  I have headace.  .    Dannie verbalizes the following treatment/discharge goals: \"Get support and learn skills\".  Client kept stating \"no\" until writer provided some possible goals and then Client endorsed the following two.      Current Stressors/Losses/Disappointments:   Family and friends- cant remember, reports mom marked this for her  Legal issues- cant remember, per records Client had a court date about a month ago related to her violating an OOP     Per Client, Review of Symptoms:  Mood (Depression/Anxiety/Jadyn/Anger): extremely happy     Thoughts: denies issues in this area.  Thoughts appear blocked, disorganized, sluggish.   Concentration/Memory: Not remembering chunks of time.  Throughout assessment, Client would often state she does not remember when asked questions   Appetite/Weight: (see also, Physical Health Screening below) Reports overeating, later denies   Sleep: Denies issues in this area, later " "reports not sleeping enough    Motivation/Energy: sudden changes in energy  Behavior: crying easily or often, per records Client has impulsivity of action as per evident by trying to jump out of a moving vehicle     Psychosis: Client denies, per records Client has history of paranoia and delusions including that her father is controlling her and poisoning her food     Trauma: Client denies any symptoms   Other: Client answers \"no\" every time she is asked about symptoms of mental health.  When writer states this is an assessment for mental health she reports having headaches.      Mental Health History:  Dannie reports first onset of mental health symptoms 2017 when she left her daughter in [facility].  When asked how this impacted mental health she said \"no.\"   When asked if this made her sad, scared, or angry she reported \" I guess\"  Dannie was first diagnosed 2017 and unsure of with what.   Dannie received the following mental health services in the past: case management, inpatient mental health services and psychiatry.   Psychiatric Hospitalizations: twice- unsure of when or why.  Per records, Client recently left hospital after lengthy stay due to delusions.    Dannie reports a history of civil commitment. Reports for one of the hospitalizations she was under a civil commitment.    Onset/Duration/Pattern of Symptoms noted above: Client denies patterns in symptoms.  When asked if she gets breaks from symptoms she said \"no.\"  Writer then stated that Client is denying any current symptoms so is she on a break from symptoms currently and she replied \"yes.\"  When asked what symptoms she was on a break from she replied she did not know.    Dannie reports the following understanding of her diagnosis: unsure.      Personal Safety:    [place]-Suicide Severity Rating Scale   Suicide Ideation   1.) Have you ever wished you were dead or that you could go to sleep and not wake up?     Lifetime: No Past Month:  Yes, (if " "yes, please discribe) : unsure of when these started or how often they are there.  When asked if she has ever wished she could go to sleep and not wake up she says yes.  When asked more details she is unable to provide more.     2.) Have you actually had any thoughts of killing yourself?   Lifetime:  No Past Month:  No   3.) Have you been thinking about how you might do this?     Lifetime:  No Past Month:  No   4.) Have you had these thoughts and had some intention of acting on them?     Lifetime:  No Past Month:  No   5.) Have you started to work out the details of how to kill yourself?   Lifetime:  No Past Month:  No   6.) Do you intend to carry out this plan?      Lifetime:  No Past Month:  No   Intensity of Ideation   Intensity of ideation (1 being least severe, 5 being most severe):     Lifetime:  \"no\"                                                                                                Past Month:  \"no\"   How often do you have these thoughts? client is unsure   When you have the thoughts how long do they last?  client says she does not have these thoughts, contradicts earlier statements   Can you stop thinking about killing yourself or wanting to die if you want to? \"no\"   Are there things - anyone or anything (i.e. family, Latter day, pain of death) that stopped you from wanting to die or acting on thoughts of suicide? \"no.\"  When prompted by writer if thinking of her daughter ever helps her to feel like she wants to live she says \"yes, my daughter.\"     What sort of reasons did you have for thinking about wanting to die or killing yourself (ie end pain, stop how you were feeling, get attention or reaction, revenge)? states she does not have these feelings, contradicts own earlier statements   Suicidal Behavior   (Suicide Attempt) - Have you made a suicide attempt?     Lifetime:  No Past Month: No   Have you engaged in self-harm (non-suicidal self-injury)?  Yes, (if yes, please discribe) : Initially " denies, then endorses Scratching self.  She is unsure of when this started or when she last acted.  Does not remember reasons or scratching.   (Interrupted Attempt) - Has there been a time when you started to do something to end your life but someone or something stopped you before you actually did anything?  No   (Aborted or Self-Interrupted Attempt) - Has there been a time when you started to do something to try to end your life but you stopped yourself before you actually did anything?  No   (Preparatory Acts of Behavior) - Have you taken any steps towards making suicide attempt or preparing to kill yourself (such as collecting pills, getting a gun, giving valuables away or writing a suicide note)? Yes, (if yes, total number of preparatory acts : Initially denies then when prompted with examples listed above states she has written goodbye letters.  When asked when this occurred and why she stated she did not remember.   Actual Lethality/Medical Damage:   0. No physical damage or very minor physical damage (e.g., surface scratches).   1. Minor physical damage (e.g., lethargic speech; first-degree burns; mild bleeding; sprains).  2. Moderate physical damage; medical attention needed (e.g., conscious but sleepy, somewhat responsive; second-degree burns; bleeding of major vessel).  3. Moderately severe physical damage; medical hospitalization and likely intensive care required (e.g., comatose with reflexes intact; third-degree burns less than 20% of body; extensive blood loss but can recover; major fractures).  4. Sever physical damage; medical hospitalization with intensive care required (e.g., comatose without reflexes; third-degree burs over 20% of body; extensive blood loss with unstable vital sign; major damage to a vital area).  5. Death       2008  [employer]  Used with permission by Ingrid Mauricio, PhD.               Guide to C-SSRS Risk Ratings   NO IDEATION:  with no  active thoughts IDEATION: with a wish to die. IDEATION: with active thoughts. Risk Ratings   If Yes No No 0 - Very Low Risk   If NA Yes No 1 - Low Risk   If NA Yes Yes 2 - Low/moderate risk   IDEATION: associated thoughts of methods without intent or plan INTENT: Intent to follow through on suicide PLAN: Plan to follow through on suicide Risk Ratings cont...   If Yes No No 3 - Moderate Risk   If Yes Yes No 4 - High Risk   If Yes Yes Yes 5 - High Risk   The patient's ADDITIONAL RISK FACTORS and lack of PROTECTIVE FACTORS may increase their overall suicide risk ratings.      Additional Risk Factors:    Significant history of having untreated or poorly treated mental health symptoms     History of impulsive or aggressive behaviors     Left daughter in [facility]    Protective Factors: Positive social support and Positive therapeutic releationships .  Client later endorses aleksey is important to her and thinking of her daughter but denied in this portion of assessment.      Risk Status   Risk Rating: Unclear DA Staff:  LAILAAR to Tx team.    Additional information to support suicide risk rating: Client denies active suicidal ideation by history of at present.  She gives contradictory reports on parasuicidal thoughts.  She denies homo cidal thoughts.  Client is a poor historian and is not providing enough information to accurately assess risk level.  She commits to safety today and creates safety plan. OR There was no additional information to provide at this time.  Please see the above suicide risk rating information.       Additional Safety Questions:    Do you have a gun, weapons or other means (including medications) to harm yourself available to you? No   Do you take chances with your safety?   no   Have you ever thought about killing someone else? No   Have you ever heard voices telling you to harm yourself or others? No       Supports:   From whom do you receive support and how often? (family/friends/agency) Mom and dad.   Was asked if there was anyone else and began talking under her breath.  When asked to clarify she denied other supports.  Later reports an uncle is a good support.     Do your support people want/need education/resources? no        Is there anything in your life (current or history) that is satisfying to you (include leisure interests/hobbies)?   yes everything- watch movies, dancing, eating      Hope/Belief System:  Do you believe things can get better? yes       Personal Safety Summary:          Dannie reports the following current fears or concerns for personal safety: not worried about it.  Initially responded yes she is worried, then stated she was not worried when asked what her concerns are.    Completed safety coping plan? yes        Substance Use History:     Substance: Hx of Use/Abuse: Last Use: Pattern of Use:   Alcohol no     Cannabis no     Street Drugs no     Prescription Drugs no     Other no       Substance Use Disorder Treatment: Dannie is currently receiving the following services: No indications of CD issues.       CAGE-AID:  Have you ever felt you ought to cut down on your drinking or drug use?   No    Have people annoyed you by criticizing your drinking or drug use?   No    Have you ever felt bad or guilty about your drinking or drug use?   No    Have you ever had a drink or used drugs first thing in the morning to steady your nerves or to get rid of a hangover?  No    Do you feel these issues have been adequately addressed? Yes If no, are you ready to address them now? NA    Chemical Dependency Assessment Recommended?  No        Dannie has a negative Cage-Aid score.     Legal History:    Dannie reports that she has not been involved with the legal system. Per records, Client seems to have had a recent court date due to violating a mutual restraining order.  ________________________________________________________________________    Life Situation (Employment/School/Finances/Basic  "Needs):  Dannie  is currently living with dad and mom in a apartment in [place] for a couple of years. She seems unsure if it has been weeks, months, or years.    The safety/stability of this environment is described as: safe and stable, no risk of homelessness    Dannie is currently unemployed:cant remember last time I worked   Dannie describes a work Hx of when asked about job history she simply stated \"no\"  Dannie reports finances are obtained through parents help me out  Dannie does not identify her finances as a current stressor.  Dannie denies a history of gambling and denies a history of gambling treatment.     Dannie reports her highest level of education is high school graduate 2012 Dannie did not identify any learning problems   Dannie describes academic performance as: well.  Hw/studying- did both well.  Tests- Well   Dannie describes school social experience as: no friends, isolated/lonely, not bullied, sports- football     Dannie reports concerns regarding her current ability to meet basic needs. Initially stated she is Worried about getting food- then stated that she gets enough to eat.  When asked about this Client began looking around the room and laughing.  When questioned what is her concern related to food she replied with \"no.\"  Per records, Client has delusions that dad is poisoning her good.    Social/Family History:  Dannie  reports she grew up in Client began talking under her breath and would not answer when asked to Clarify.  Per records, it seems Client grew up in [facility].  Dannie was the first born of 2 children. One brother is 23.  Dannie reports her biological parents are     Dannie describes her childhood as happy (endorsed when provided options, did not volunteer)  Dannie describes her current relationships with her family of origin as Mom and dad- close.  Brother- not close, does not fight, just does not talk. Grandmother is in charge of Client's " "daughter, Client denies having contact with her grandmother or daughter.    Dannie identifies her relationship status as: single.    Dannie identifies her sexual orientation as: neither   Dannie denies sexual health concerns.     Dannie reports having 1 children. Daughter,6 or 7- she is in [facility]. I miss her a lot. Per records, it seems Client has reported her daughter as much younger than this (around age 4)     Dannie describes the quantity/quality of her social relationships as would like more people to support me, no friends, would like friends        Significant Losses / Trauma / Abuse / Neglect Issues / Developmental Incidents:  Dannie denies significant loss/trauma/abuse/neglect issues/developmental incidents Client answers \"no\"  Dannie reports changes in child custody rights grandmother is in charge of daughter in [facility]   Dannie has not addressed the above concerns in previous therapy/treatment     Dannie denies personal  experience.     [facility] Preference/Spiritual Beliefs/Cultural Considerations: None, Later reports praying and going to [employer].  Per records, identifies as Mosque.    A. Ethnic Self-Identification:  Dannie self-identifies her race/ethnicities as: does not identify answers question with \"no\" and her preferred language to be English.   Dannie reports she does not need the assistance of an . Dannie  reports she does not need other support or modifications involved in therapy.      B. Do you experience cultural bias (the practice of interpreting judging behavior by standards inherent to one's own culture) by other people as a stressor? If yes, describe how this relates to overall mental health symptoms.  No    C. Are there any cultural influences that may need to be considered for your treatment?  (This includes historical, geographical and familial factors that affect assessment and intervention processes). No, Denies any cultural influences or concerns " that need to be considered for treatment    Strengths/Vulnerabilities:   Dannie identifies her personal strengths as: caring, educated, good listener, intelligent, open to learning, responsible parent, support of family, friends and providers, wants to learn, willing to ask questions and willing to relate to others .   Things that may interfere with the clients success in treatment include: NA. Someone will bring me   Other identified areas of vulnerability include: Suicidal Ideation  Depressive symptoms.   The above are as endorsed by Client.  Per records she seems to have impulsive actions, anxiety, and some cognitive difficulties.    Medical History / Physical Health Screen:     Primary Care Physician: Dannie does not have a Primary Care Provider and was encouraged to establish care with a PCP..   Last Physical Exam: within the past year. Client was encouraged to follow up with PCP if symptoms were to develop.    Mental Health Medication Management Provider / Psychiatrist: Dannie has a psychiatrist whose name and location are: [facility].     Last visit: Cant remember        Next visit: Unsure of when    Current medications including prescription, non-prescription, herbals, dietary aids and vitamins:  Per client report:   No outpatient medications have been marked as taking for the 2/21/19 encounter (Hospital Encounter) with Melissa Rodriguez       Dannie reports current medications are: Effective. - taking medications but not sure what  Dannie describes taking her medications as: Requires assistance.- Mom and dad help give them to me  Dannie reports taking prescribed medications as prescribed.     Dannie provides the following current assessment of pain: 0; no pain    Dannie provides the following information regarding past significant medical conditions/diagnoses:      Medical:  Past Medical History:   Diagnosis Date     Asthma     Reported by patient, not on any PTA medications      Gastroesophageal reflux disease      Psychosis (H)     DDx includes Schizophrenia vs BPAD vs other Psychotic Disorder       Surgical:  No past surgical history on file.  Allergy:   Dannie reports   Allergies   Allergen Reactions     Septra [Sulfamethoxazole W/Trimethoprim]         Family History of Medical, Mental Health and/or Substance Use problems:  Per client report: No family history on file.    Dannie reports no current medical concerns.      General Health:   Have you had any exposure to any communicable disease in the past 2-3 weeks? no     Are you aware of safe sex practices? no     Is there a possibility of pregnancy?  no       Nutrition:    Are you on a special diet? If yes, please explain:  no   Do you have any concerns regarding your nutritional status? If yes, please explain:  no   Have you had any appetite changes in the last 3 months?  No     Have you had any weight loss or weight gain in the last 3 months?  No     Do you have a history of an eating disorder? no   Do you have a history of being in an eating disorder program? no   Do you have any dental concerns? no   NOTE: BMI to be calculated following program admission.    Fall Risk:   Have you had any falls in the past 3 months? no     Do you currently use any assistive devices for mobility?   no     NOTE: If client reports 3 or more falls in the past 3 months, the client will not be accepted into the program until further assessment is completed by the program nurse. Check if a nurse is available to assess at time of DA.    NOTE: If client reports 2 falls in the past 3 months and/or the client currently uses assistive devices for mobility, the  will send an in-basket to the program nurse to meet with the client within the first week of programming.    Head Injury/Trauma:   Do you have a history of head injury / trauma? no     Do you have any cognitive impairment? no       Per completion of the Medical History / Physical Health Screen,  is there a recommendation to see / follow up with a primary care physician/clinic or dentist?    Yes, Recommendations:   other: establish care      Clinical Findings     Mental Status Assessment/Clinical Observation:  Appearance:   adequately groomed and appeared as age stated  Eye Contact:   poor   Psychomotor Behavior: Catatonic  no evidence of tardive dyskinesia, dystonia, or tics  Attitude:   Uncooperative  but not aggressive or hostile  Oriented to:   Time  .  Client did not seem to know where she was or why despite writer explaining this multiple times  Speech   Rate / Production: Impoverished  Monotone  Slow , Delayed speech, poverty of words,    Volume:  Soft   Mood:    Reports mood as normal.       Affect:    Constricted  Labile      Thought Content:  Unclear due to lack of information provided by client  passive suicidal ideation present  Thought Form:  disorganized and evidence of thought blocking present no loose associations  Insight:    poor    Judgment:     poor  Attention Span/Concentration: limited  Recent and Remote Memory:  poor      Psychiatric Diagnosis:    295.70  (F25.1) Schizoaffective Disorder Depressive Type    Provisional Diagnostic Hypothesis (Explain R/O, other Provisional Diagnosis, and why alternative Diagnosis that were considered were ruled out):   Unclear as client is a poor historian and would not provide any information.    Medical Concerns that may Impact Treatment:   Client denies    Psychosocial and Contextual Factors (V-Codes):      WHODAS 2.0 SCORE: 22/92.9 %      Client and family participation in assessment:   Dannie was alone during this assessment. Parents attended appointment.  After assessment writer brought parents into the room with Client and with her permission asked them what symptoms they noticed.  They denied noticing mood symptoms (sadness, anxiety/fear, anger).  They denied behavioral symptoms (impulsivity, poor self care, isolating).  However, per records  "there is evidence that Client tried to impulsively jump out of dad's car while he was driving it.  There is also evidence that Client has struggled with personal hygiene and had poor body odor.  Parents report only that Client is forgetful sometimes.  Explained programming and reflected that if Client is not having symptoms of mental health that this is not a good fit.  Asked parents what they are hoping to get for Client.  They reported more socialization and an ability to independently attend appointments.  They then stated they could only bring Client to groups if it is in the mornings.    This assessment does include collateral information. Records     Summary & Recommendations  Provide a brief summary of how diagnostic criteria is met (symptoms, duration & functional impairment), cause, prognosis, and likely consequences of symptoms. Include overview of pertinent client strengths, cultural influences, life situations, relationships, health concerns and how diagnosis interacts/impacts with client's life. Recommendations include: client preferences, prioritization of needed mental health, ancillary or other services and any referrals to services required by statute or rule.     This is Client's fourth attempt at coming to an intake.  She arrived 5 minutes prior to appointment time  (12:55 pm) with her mother and they sat filling out paperwork together until 1:50.  When writer got paperwork there were 5 symptoms and two stressors marked on the entire checklist.  They did not complete medications or current providers.  It is unclear what took Client/her mother so long in completing paperwork.  Going over treatment expectations and medical background took until 10 and Client did not seem to want to participate.  Asked Client if she wanted to do assessment and was interested in mental health services (explained what groups would look like and what they could help with) and she replied \"yes\" and mental health " "assessment portion of intake began at 10 and took about an hour.  Client answered consistently \"no\" despite the questions being non- yes or no questions or despite it being clear the accurate answer was in the affirmative.  When she did answer questions she would often say \" I can't remember\" even when asking about current experiences in the moment.  She was a poor historian, seems to have low insight into problems as she stated the only thing she wants help with is \"headaches,\" and does not seem interested in treatment despite statement to the contrary.  Much of the following information was gained from records.    Client is 23 year old, single female.  She is from [facility] and currently lives with her parents in [facility] for an unknown period of time.  She reports in this assessment it has been either a few weeks, months, or years as she answers in the affirmative to all.  Per records, it seems she moved to the  in 2017 and had to leave her daughter behind.  During this assessment Client had to count on her fingers multiple times to determine her daughters age and came up with either 6 or 7.  Per records daughter is 4.  She is not currently employed and denies a history of working.  She reports she graduated high school in 2012 and did not have learning problems.  Her support network seems to included her parents and an uncle.  Her professional supports include her psychiatrist (Guillermo Li) and  (Lelo Latham) both through [facility].    Client did not provide adequate symptom criteria to meet a diagnosis in this assessment.  Through reading all of Client's records, it seems Client has schizoaffective disorder, depressed type.  On symptom checklist today she endorses feeling extremely happy, is not remembering chunks of time, is overeating, has sudden changes in energy, and cries often.  She later denies these when asked.  She provides contradictory information throughout assessment- at times " endorsing depression and at times denying it.  She reports stressors related to relationships and legal issues but when asked what they are denies them stating mom wrote them.  Per records, Client had a court date recently related to her violating a restraining order.  Per records she has labile moods, delusions, and seems to respond to internal stimuli. Per records, she reports concerns dad and brother are trying to control her thoughts, dad is trying to poison her food, and has seen/talked to dad on inpatient unit was her father was not physically present.  During assessment affect was flat except for three times where she looked to a different area of the room/out the window and started laughing and smiling unrelated to content discussed during assessment.  This lasted only about 2 seconds before Client would return to a flat affect.      Client reports being hospitalized twice for mental health but was unsure of why or when.  She denies history of suicide attempts or active suicidal thoughts.  She gave contradictory reports as to if she has had parasuicide ideation- at times endorsing she wishes she could go to sleep and not wake up and at times denying it.  She reports history of scratching self when asked about self harm but is unsure of when this started or when she last acted on it.  She denies history of being impulsive despite records stating she attempted to jump out of a moving vehicle or would wander off without warning.  She denies hallucinations or delusions despite seeming evidence to the contrary in records.  She denies homicidal ideation.      She is not sure who referred her to day treatment or why.  [facility] referred her.  However, as she is not endorsing symptoms at this time and does not seem able to interact in a group setting in any meaningful way, she will not be places in day treatment programming.  Writer will contact  and encourage her to get Client set up with and [facility]  worker and if/when Client is able consider individual therapy prior to returning to a group based setting.      Prognosis is Poor. Without the recommended intervention, the client is likely to experience the following consequences of their symptoms: increase in symptoms, decrease in functioning, worsening safety concerns, need for higher level of care.     Referrals to services required by statute or rule:   Report to child/adult protection services was NA.   Referral to another professional/service is not indicated at this time..    Program Recommendation: Increased case management and [facility] or ILS worker    Assessment Completed by: Melissa Rodriguez

## 2019-02-21 NOTE — PROGRESS NOTES
Adult Outpatient Mental Health Programs    MY COPING PLAN FOR SAFETY    NAME: Dannie Davis  MRN: [id number]  SAFETY PLAN:  Step 1: Warning signs / cues (Thoughts, images, mood, situation, behavior) that a crisis may be developing:    Thoughts: NA    Images: NA    Thinking Processes: NA    Mood: worsening depression    Behaviors: not sleeping enough    Situations: pain, relationship problems and financial stress   Step 2: Coping strategies - Things I can do to take my mind off of my problems without contacting another person (relaxation technique, physical activity):    Distress Tolerance Strategies:  relaxation activities: play music, , arts and crafts: fela, listen to positive and upbeat music:   and pray    Physical Activities: NA    Focus on helpful thoughts:  NA  Step 3: People and social settings that provide distraction:   Name: Mom Phone:     movie theater and [employer]   Step 4: Remind myself of people and things that are important to me and worth living for:  For my daughter  Step 5: When I am in crisis, I can ask these people to help me use my safety plan:   Name: Dad Phone:    Name: Uncle Phone:   Step 6: Making the environment safe:     None  Step 7: Professionals or agencies I can contact during a crisis:    Suicide Prevention Lifeline: 8-632-181-TALK (7991)    Crisis Text Line Service (available 24 hours a day, 7 days a week): Text [place] to 792677    Call  **CRISIS (503358) from a cell phone to talk to a team of professionals who can help you.  Crisis Services By [facility]: Phone Number:   [place]     [phone]   [place]    [phone]   [place]    [phone]   [place]    [phone]   [place]    [phone]   [place] [phone]   [place]     [phone]       Call 911 or go to my nearest emergency department.     I helped develop this safety plan and agree to use it when needed.  I have been given a copy of this plan.      Client signature  _________________________________________________________________  Today s date:  2/21/2019  Adapted from Safety Plan Template 2008 Mabel Carver and Nathaniel Bullock is reprinted with the express permission of the authors.  No portion of the Safety Plan Template may be reproduced without the express, written permission.  You can contact the authors at [email or web address] or [email or web address].

## 2019-02-21 NOTE — PROGRESS NOTES
Acknowledgement of Current Treatment Plan       I have reviewed my treatment plan with my therapist / counselor on 2/21/2019. I agree with the plan as it is written in the electronic health record.    Name Signature   Dannie Davis    Name of Therapist / Counselor    Simeon Rodriguez MA [facility]

## 2019-02-21 NOTE — PROGRESS NOTES
"Initial Individual Treatment Plan     Patient: Dannie Davis   MRN: [id number]  : 1995  Age: 23 year old  Sex: female    Diagnostic Assessment Date / Date of Initial Individual Treatment Plan: 19      Immediate Health Concerns:  No     Immediate Safety Concerns:  No. Per history, see safety plan.    Identify the issues to be addressed in treatment:  Symptom Management, Personal Safety, Community Resources/Discharge Planning, Develop / Improve Independent Living Skills, Develop Socialization / Interpersonal Relationship Skills and Physical Health     Client Initial Individualized Goals for Treatment:Get support and learn skills\".    Initial Treatment suggestions for the client during the time between Diagnostic Assessment and completion of the Individualized Treatment Plan:  Follow Safety Plan  Abstain from Substance Use   Ask for more information, support and/or assistance as needed.  Follow up with providers/community supports as needed: Unsure of how often with psychiatrist, Unsure of how often with   Report increases or changes in symptoms to staff.  Report any personal safety concerns to staff.   Take medications as prescribed.  Report medication changes and/or side effects to staff.  Attend and participate in groups as scheduled or notify staff if unable to do so.  Report any use of substances to staff as this may impact your symptoms and/or personal safety.  Notify staff if you have any other issues that need to be addressed. This may include any current abuse / neglect / exploitation or other vulnerability.  Follow recommendations of your treatment team and discuss concerns if not in agreement.     Treatment Team Responsible: Day Treatment (DT)      Therapeutic Interventions/Treatment Strategies may include:  Support, Redirection, Feedback, Limit/Boundaries, Safety Assessments, Structured Activity, Problem Solving, Clarification, Education, Motivational Enhancement and Relapse " Prevention as needed.    Melissa Rodriguez

## 2019-02-21 NOTE — TELEPHONE ENCOUNTER
Left voicemail for Client's  updating them that Client is not a good fit for day treatment programming.  Provided rationale.  Encouraged getting Client [facility] or ILS worker and if/when she is able to pursue individual therapist to increase insight into symptoms.  Stated plans to call and update Client and her parents on this upon hanging up.    Left voicemail for Client stating the same.  Stated that writer had already updated her  via voicemail and planned to update her parents as per her request to do so.  Left voicemail for Client's father (GARRETT on file and per Client request) updating him of the same.

## 2019-05-20 ENCOUNTER — TRANSFERRED RECORDS (OUTPATIENT)
Dept: HEALTH INFORMATION MANAGEMENT | Facility: CLINIC | Age: 24
End: 2019-05-20

## 2019-05-21 ENCOUNTER — APPOINTMENT (OUTPATIENT)
Dept: CT IMAGING | Facility: CLINIC | Age: 24
End: 2019-05-21
Attending: FAMILY MEDICINE
Payer: COMMERCIAL

## 2019-05-21 ENCOUNTER — HOSPITAL ENCOUNTER (EMERGENCY)
Facility: CLINIC | Age: 24
Discharge: HOME | End: 2019-05-21
Attending: FAMILY MEDICINE | Admitting: FAMILY MEDICINE
Payer: COMMERCIAL

## 2019-05-21 VITALS
RESPIRATION RATE: 16 BRPM | HEART RATE: 84 BPM | SYSTOLIC BLOOD PRESSURE: 119 MMHG | DIASTOLIC BLOOD PRESSURE: 81 MMHG | OXYGEN SATURATION: 100 % | TEMPERATURE: 98.2 F

## 2019-05-21 DIAGNOSIS — M62.81 MUSCLE WEAKNESS (GENERALIZED): ICD-10-CM

## 2019-05-21 LAB
ALBUMIN SERPL-MCNC: 3.6 G/DL (ref 3.4–5)
ALBUMIN UR-MCNC: NEGATIVE MG/DL
ALP SERPL-CCNC: 76 U/L (ref 40–150)
ALT SERPL W P-5'-P-CCNC: 20 U/L (ref 0–50)
AMPHETAMINES UR QL SCN: NEGATIVE
ANION GAP SERPL CALCULATED.3IONS-SCNC: 9 MMOL/L (ref 3–14)
APPEARANCE UR: CLEAR
AST SERPL W P-5'-P-CCNC: 17 U/L (ref 0–45)
BARBITURATES UR QL: NEGATIVE
BASOPHILS # BLD AUTO: 0 10E9/L (ref 0–0.2)
BASOPHILS NFR BLD AUTO: 0.2 %
BENZODIAZ UR QL: NEGATIVE
BILIRUB SERPL-MCNC: 0.3 MG/DL (ref 0.2–1.3)
BILIRUB UR QL STRIP: NEGATIVE
BUN SERPL-MCNC: 7 MG/DL (ref 7–30)
CALCIUM SERPL-MCNC: 8.9 MG/DL (ref 8.5–10.1)
CANNABINOIDS UR QL SCN: NEGATIVE
CHLORIDE SERPL-SCNC: 106 MMOL/L (ref 94–109)
CO2 SERPL-SCNC: 26 MMOL/L (ref 20–32)
COCAINE UR QL: NEGATIVE
COLOR UR AUTO: NORMAL
CREAT SERPL-MCNC: 0.57 MG/DL (ref 0.52–1.04)
DIFFERENTIAL METHOD BLD: ABNORMAL
EOSINOPHIL # BLD AUTO: 0.1 10E9/L (ref 0–0.7)
EOSINOPHIL NFR BLD AUTO: 1.4 %
ERYTHROCYTE [DISTWIDTH] IN BLOOD BY AUTOMATED COUNT: 12.1 % (ref 10–15)
ETHANOL UR QL SCN: NEGATIVE
GFR SERPL CREATININE-BSD FRML MDRD: >90 ML/MIN/{1.73_M2}
GLUCOSE SERPL-MCNC: 85 MG/DL (ref 70–99)
GLUCOSE UR STRIP-MCNC: NEGATIVE MG/DL
HCG UR QL: NEGATIVE
HCT VFR BLD AUTO: 36.3 % (ref 35–47)
HGB BLD-MCNC: 12.1 G/DL (ref 11.7–15.7)
HGB UR QL STRIP: NEGATIVE
IMM GRANULOCYTES # BLD: 0 10E9/L (ref 0–0.4)
IMM GRANULOCYTES NFR BLD: 0.4 %
INTERPRETATION ECG - MUSE: NORMAL
KETONES UR STRIP-MCNC: NEGATIVE MG/DL
LEUKOCYTE ESTERASE UR QL STRIP: NEGATIVE
LITHIUM SERPL-SCNC: <0.2 MMOL/L (ref 0.6–1.2)
LYMPHOCYTES # BLD AUTO: 2 10E9/L (ref 0.8–5.3)
LYMPHOCYTES NFR BLD AUTO: 36 %
MCH RBC QN AUTO: 26.2 PG (ref 26.5–33)
MCHC RBC AUTO-ENTMCNC: 33.3 G/DL (ref 31.5–36.5)
MCV RBC AUTO: 79 FL (ref 78–100)
MONOCYTES # BLD AUTO: 0.3 10E9/L (ref 0–1.3)
MONOCYTES NFR BLD AUTO: 6 %
NEUTROPHILS # BLD AUTO: 3.2 10E9/L (ref 1.6–8.3)
NEUTROPHILS NFR BLD AUTO: 56 %
NITRATE UR QL: NEGATIVE
NRBC # BLD AUTO: 0 10*3/UL
NRBC BLD AUTO-RTO: 0 /100
OPIATES UR QL SCN: NEGATIVE
PH UR STRIP: 6 PH (ref 5–7)
PLATELET # BLD AUTO: 214 10E9/L (ref 150–450)
POTASSIUM SERPL-SCNC: 3.6 MMOL/L (ref 3.4–5.3)
PROT SERPL-MCNC: 8.3 G/DL (ref 6.8–8.8)
RBC # BLD AUTO: 4.62 10E12/L (ref 3.8–5.2)
RBC #/AREA URNS AUTO: 1 /HPF (ref 0–2)
SODIUM SERPL-SCNC: 141 MMOL/L (ref 133–144)
SOURCE: NORMAL
SP GR UR STRIP: 1 (ref 1–1.03)
UROBILINOGEN UR STRIP-MCNC: NORMAL MG/DL (ref 0–2)
WBC # BLD AUTO: 5.6 10E9/L (ref 4–11)
WBC #/AREA URNS AUTO: 1 /HPF (ref 0–5)

## 2019-05-21 PROCEDURE — 90791 PSYCH DIAGNOSTIC EVALUATION: CPT

## 2019-05-21 PROCEDURE — 96360 HYDRATION IV INFUSION INIT: CPT | Performed by: FAMILY MEDICINE

## 2019-05-21 PROCEDURE — 93005 ELECTROCARDIOGRAM TRACING: CPT | Performed by: FAMILY MEDICINE

## 2019-05-21 PROCEDURE — 96361 HYDRATE IV INFUSION ADD-ON: CPT | Performed by: FAMILY MEDICINE

## 2019-05-21 PROCEDURE — 80178 ASSAY OF LITHIUM: CPT | Performed by: FAMILY MEDICINE

## 2019-05-21 PROCEDURE — 80320 DRUG SCREEN QUANTALCOHOLS: CPT | Performed by: FAMILY MEDICINE

## 2019-05-21 PROCEDURE — 99284 EMERGENCY DEPT VISIT MOD MDM: CPT | Mod: Z6 | Performed by: FAMILY MEDICINE

## 2019-05-21 PROCEDURE — 70450 CT HEAD/BRAIN W/O DYE: CPT

## 2019-05-21 PROCEDURE — 80307 DRUG TEST PRSMV CHEM ANLYZR: CPT | Performed by: FAMILY MEDICINE

## 2019-05-21 PROCEDURE — 85025 COMPLETE CBC W/AUTO DIFF WBC: CPT | Performed by: FAMILY MEDICINE

## 2019-05-21 PROCEDURE — 81001 URINALYSIS AUTO W/SCOPE: CPT | Performed by: FAMILY MEDICINE

## 2019-05-21 PROCEDURE — 81025 URINE PREGNANCY TEST: CPT | Performed by: FAMILY MEDICINE

## 2019-05-21 PROCEDURE — 80053 COMPREHEN METABOLIC PANEL: CPT | Performed by: FAMILY MEDICINE

## 2019-05-21 PROCEDURE — 99285 EMERGENCY DEPT VISIT HI MDM: CPT | Mod: 25 | Performed by: FAMILY MEDICINE

## 2019-05-21 ASSESSMENT — ENCOUNTER SYMPTOMS
COUGH: 0
DIARRHEA: 0
CHILLS: 0
HEMATOLOGIC/LYMPHATIC NEGATIVE: 1
NAUSEA: 0
PALPITATIONS: 0
SHORTNESS OF BREATH: 0
VOMITING: 0
BACK PAIN: 0
AGITATION: 0
NECK STIFFNESS: 0
NERVOUS/ANXIOUS: 0

## 2019-05-21 NOTE — DISCHARGE INSTRUCTIONS
Your brain scan and your ekg are all normal.  Your blood work is all normal.  Your blood salts are normal  Your cbc is normal    Keep psychiatry appt for later this month  If not improving- tell ACT team  Try to eat well and drink lots of liquids

## 2019-05-21 NOTE — ED AVS SNAPSHOT
[facility], [place], Emergency Department  [address]  [place] [address]  Phone:  [phone]  Fax:  [phone]                                    Dannie Davis   MRN: [id number]    Department:  [facility], Emergency Department   Date of Visit:  5/21/2019           After Visit Summary Signature Page    I have received my discharge instructions, and my questions have been answered. I have discussed any challenges I see with this plan with the nurse or doctor.    ..........................................................................................................................................  Patient/Patient Representative Signature      ..........................................................................................................................................  Patient Representative Print Name and Relationship to Patient    ..................................................               ................................................  Date                                   Time    ..........................................................................................................................................  Reviewed by Signature/Title    ...................................................              ..............................................  Date                                               Time          22EPIC Rev 08/18

## 2019-05-21 NOTE — ED NOTES
Bed: [facility]  Expected date:   Expected time:   Means of arrival:   Comments:  Neri 9[address] 23 yr old head injury from a fall

## 2019-05-21 NOTE — ED PROVIDER NOTES
"    [facility] EMERGENCY DEPARTMENT ([facility])    5/21/19       History     Chief Complaint   Patient presents with     Syncope     got dizzy (ongoing dizziness for past few weeks, has been eval'd) and fell to floor today at home, family called 911. Pt c/o only R side neck pain, EMS reports difficult to obtain history from family and pt. Pt is on clozaril.     The history is provided by a parent and the patient. The history is limited by the condition of the patient (Quiet).     Dannie Davis is a 23 year old female with a medical history significant for depression/schizophrenia who presents to the Emergency Department for evaluation after a falling episode.  The patient reports that she fell onto the kitchen floor - she is markedly delayed in verbal answering- she is not certain if there was loc. No tongue biting or incontinance. Mom apparently found her. 911 caled. The patient's father added additional history.  The patient for the past 2 weeks has been complaining of a headache, fatigue and generalized weakness.  The father reports that her symptoms worsened on 5/19/2019.  The patient saw her PCP at the [facility] clinic and the father reports that she had \"testing done\" and he states that it all returned normal.  Results obtained show normal cbc. Apparently the pt gets mental health care at the [facility] clininc. The father did mention that the patient was started on Clozaril in January of this year and recent decreased lithium dose 2 weeks ago. She states \"my head feels very heavy\"    The pt is very delayed in her response but she denies cp, soa, palpitations or nausea or vomiting. Denies street drugs    I have reviewed the Medications, Allergies, Past Medical and Surgical History, and Social History in the [facility] system.    Past Medical History:   Diagnosis Date     Asthma     Reported by patient, not on any PTA medications     Depressive disorder      Gastroesophageal reflux disease      History of blood " transfusion      Psychosis (H)     DDx includes Schizophrenia vs BPAD vs other Psychotic Disorder       History reviewed. No pertinent surgical history.    No family history on file.    Social History     Tobacco Use     Smoking status: Never Smoker     Smokeless tobacco: Never Used   Substance Use Topics     Alcohol use: Yes       No current facility-administered medications for this encounter.      Current Outpatient Medications   Medication     cloZAPine (CLOZARIL) 50 MG tablet     acetaminophen (TYLENOL) 325 MG tablet     docusate sodium (COLACE) 250 MG capsule     lithium ER (LITHOBID) 300 MG CR tablet     LORazepam (ATIVAN) 1 MG tablet     metoprolol tartrate (LOPRESSOR) 25 MG tablet     polyethylene glycol (MIRALAX/GLYCOLAX) packet     vitamin D3 2000 units tablet        Allergies   Allergen Reactions     Septra [Sulfamethoxazole W/Trimethoprim]          Review of Systems   Constitutional: Negative for chills.   HENT: Negative for congestion.    Respiratory: Negative for cough and shortness of breath.    Cardiovascular: Negative for chest pain, palpitations and leg swelling.   Gastrointestinal: Negative for diarrhea, nausea and vomiting.   Musculoskeletal: Negative for back pain and neck stiffness.   Skin: Negative.    Allergic/Immunologic: Negative for immunocompromised state.   Neurological: Positive for syncope.   Hematological: Negative.    Psychiatric/Behavioral: Negative for agitation and suicidal ideas. The patient is not nervous/anxious.    All other systems reviewed and are negative.      Physical Exam   BP: 119/81  Pulse: 84  Temp: 98.2  F (36.8  C)  Resp: 16  SpO2: 100 %      Physical Exam   Constitutional: She is oriented to person, place, and time. She appears well-developed and well-nourished. No distress.   HENT:   Head: Normocephalic and atraumatic.   Eyes: Pupils are equal, round, and reactive to light.   Neck: Normal range of motion. Neck supple.   Cardiovascular: Normal rate, regular  rhythm, normal heart sounds and intact distal pulses.   No murmur heard.  Pulmonary/Chest: Effort normal and breath sounds normal. No respiratory distress.   Abdominal: There is no tenderness.   Neurological: She is alert and oriented to person, place, and time.   Will walk across room without help if urged   Skin: Skin is warm and dry. She is not diaphoretic.   Nursing note and vitals reviewed.      ED Course   2:08 PM  The patient was seen and examined by Koffi Melgar MD in Room ED17.        Procedures        EKG done. nsr rate of 79. Normal ekg with normal st t waves and intervals normalno change from 12 28 18  Head ct normal  Records asked for from [facility]- CBC yesterday was normal- no chemistries or lithium level done      Labs Ordered and Resulted from Time of ED Arrival Up to the Time of Departure from the ED   CBC WITH PLATELETS DIFFERENTIAL - Abnormal; Notable for the following components:       Result Value    MCH 26.2 (*)     All other components within normal limits   LITHIUM LEVEL - Abnormal; Notable for the following components:    Lithium Level <0.20 (*)     All other components within normal limits   DRUG ABUSE SCREEN 6 CHEM DEP URINE ([facility])   HCG QUALITATIVE URINE   COMPREHENSIVE METABOLIC PANEL   ROUTINE UA WITH MICROSCOPIC REFLEX TO CULTURE            Assessments & Plan (with Medical Decision Making)       I have reviewed the nursing notes.    I have reviewed the findings, diagnosis, plan and need for follow up with the patient.       Medication List      There are no discharge medications for this visit.         Final diagnoses:   Muscle weakness (generalized)     Froilan LAYTON, am serving as a trained medical scribe to document services personally performed by Koffi Melgar MD, based on the provider's statements to me.   Koffi LAYTON MD, was physically present and have reviewed and verified the accuracy of this note documented by Froilan Holliday.    5/21/2019   [facility], [place], EMERGENCY  DEPARTMENT     Koffi Melgar MD  05/21/19 [id number]